# Patient Record
Sex: FEMALE | Race: WHITE | NOT HISPANIC OR LATINO | Employment: OTHER | URBAN - METROPOLITAN AREA
[De-identification: names, ages, dates, MRNs, and addresses within clinical notes are randomized per-mention and may not be internally consistent; named-entity substitution may affect disease eponyms.]

---

## 2017-01-04 RX ORDER — LEVOTHYROXINE SODIUM 112 UG/1
112 TABLET ORAL DAILY
Qty: 90 TABLET | Refills: 1 | Status: SHIPPED | OUTPATIENT
Start: 2017-01-04 | End: 2017-06-23 | Stop reason: SDUPTHER

## 2017-01-04 RX ORDER — PAROXETINE HYDROCHLORIDE 20 MG/1
20 TABLET, FILM COATED ORAL DAILY
Qty: 90 TABLET | Refills: 1 | Status: SHIPPED | OUTPATIENT
Start: 2017-01-04 | End: 2017-06-23 | Stop reason: SDUPTHER

## 2017-01-04 RX ORDER — WARFARIN SODIUM 3 MG/1
TABLET ORAL
Qty: 40 TABLET | Refills: 5 | Status: SHIPPED | OUTPATIENT
Start: 2017-01-04 | End: 2017-06-23 | Stop reason: SDUPTHER

## 2017-01-12 ENCOUNTER — CLINICAL SUPPORT (OUTPATIENT)
Dept: FAMILY MEDICINE CLINIC | Facility: CLINIC | Age: 82
End: 2017-01-12

## 2017-01-12 DIAGNOSIS — I48.0 PAROXYSMAL ATRIAL FIBRILLATION (HCC): Primary | ICD-10-CM

## 2017-01-12 LAB — INR PPP: 3.6 (ref 0.9–1.1)

## 2017-01-12 PROCEDURE — 36416 COLLJ CAPILLARY BLOOD SPEC: CPT | Performed by: FAMILY MEDICINE

## 2017-01-12 PROCEDURE — 85610 PROTHROMBIN TIME: CPT | Performed by: FAMILY MEDICINE

## 2017-01-16 ENCOUNTER — CLINICAL SUPPORT (OUTPATIENT)
Dept: FAMILY MEDICINE CLINIC | Facility: CLINIC | Age: 82
End: 2017-01-16

## 2017-01-16 DIAGNOSIS — I48.0 PAROXYSMAL ATRIAL FIBRILLATION (HCC): Primary | ICD-10-CM

## 2017-01-16 LAB — INR PPP: 1.3 (ref 0.9–1.1)

## 2017-01-16 PROCEDURE — 85610 PROTHROMBIN TIME: CPT | Performed by: FAMILY MEDICINE

## 2017-01-16 PROCEDURE — 36416 COLLJ CAPILLARY BLOOD SPEC: CPT | Performed by: FAMILY MEDICINE

## 2017-01-23 ENCOUNTER — CLINICAL SUPPORT (OUTPATIENT)
Dept: FAMILY MEDICINE CLINIC | Facility: CLINIC | Age: 82
End: 2017-01-23

## 2017-01-23 DIAGNOSIS — I48.0 PAROXYSMAL ATRIAL FIBRILLATION (HCC): Primary | ICD-10-CM

## 2017-01-23 LAB — INR PPP: 1.5 (ref 0.9–1.1)

## 2017-01-23 PROCEDURE — 36416 COLLJ CAPILLARY BLOOD SPEC: CPT | Performed by: FAMILY MEDICINE

## 2017-01-23 PROCEDURE — 85610 PROTHROMBIN TIME: CPT | Performed by: FAMILY MEDICINE

## 2017-01-26 ENCOUNTER — HOSPITAL ENCOUNTER (INPATIENT)
Facility: HOSPITAL | Age: 82
LOS: 9 days | Discharge: NURSING, REHAB OR LONG-TERM CARE | End: 2017-02-04
Attending: HOSPITALIST | Admitting: HOSPITALIST

## 2017-01-26 ENCOUNTER — OFFICE VISIT (OUTPATIENT)
Dept: FAMILY MEDICINE CLINIC | Facility: CLINIC | Age: 82
End: 2017-01-26

## 2017-01-26 ENCOUNTER — APPOINTMENT (OUTPATIENT)
Dept: GENERAL RADIOLOGY | Facility: HOSPITAL | Age: 82
End: 2017-01-26
Attending: HOSPITALIST

## 2017-01-26 VITALS
TEMPERATURE: 98 F | OXYGEN SATURATION: 86 % | HEART RATE: 82 BPM | SYSTOLIC BLOOD PRESSURE: 120 MMHG | DIASTOLIC BLOOD PRESSURE: 60 MMHG

## 2017-01-26 DIAGNOSIS — E03.9 ACQUIRED HYPOTHYROIDISM: ICD-10-CM

## 2017-01-26 DIAGNOSIS — R53.1 GENERALIZED WEAKNESS: Primary | ICD-10-CM

## 2017-01-26 DIAGNOSIS — I48.0 PAROXYSMAL ATRIAL FIBRILLATION (HCC): ICD-10-CM

## 2017-01-26 DIAGNOSIS — R53.82 CHRONIC FATIGUE: ICD-10-CM

## 2017-01-26 DIAGNOSIS — R05.9 COUGH: Primary | ICD-10-CM

## 2017-01-26 PROBLEM — J96.01 ACUTE RESPIRATORY FAILURE WITH HYPOXIA (HCC): Status: ACTIVE | Noted: 2017-01-26

## 2017-01-26 PROBLEM — J18.9 PNEUMONIA: Status: ACTIVE | Noted: 2017-01-26

## 2017-01-26 LAB
ALBUMIN SERPL-MCNC: 3.8 G/DL (ref 3.5–5.2)
ALBUMIN/GLOB SERPL: 1.4 G/DL
ALP SERPL-CCNC: 80 U/L (ref 39–117)
ALT SERPL W P-5'-P-CCNC: 19 U/L (ref 1–33)
ANION GAP SERPL CALCULATED.3IONS-SCNC: 10.5 MMOL/L
AST SERPL-CCNC: 28 U/L (ref 1–32)
B PERT DNA SPEC QL NAA+PROBE: NOT DETECTED
BASOPHILS # BLD AUTO: 0.01 10*3/MM3 (ref 0–0.2)
BASOPHILS NFR BLD AUTO: 0.3 % (ref 0–1.5)
BILIRUB SERPL-MCNC: 0.5 MG/DL (ref 0.1–1.2)
BUN BLD-MCNC: 14 MG/DL (ref 8–23)
BUN/CREAT SERPL: 15.7 (ref 7–25)
C PNEUM DNA NPH QL NAA+NON-PROBE: NOT DETECTED
CALCIUM SPEC-SCNC: 9.1 MG/DL (ref 8.2–9.6)
CHLORIDE SERPL-SCNC: 105 MMOL/L (ref 98–107)
CO2 SERPL-SCNC: 28.5 MMOL/L (ref 22–29)
CREAT BLD-MCNC: 0.89 MG/DL (ref 0.57–1)
DEPRECATED RDW RBC AUTO: 57.1 FL (ref 37–54)
EOSINOPHIL # BLD AUTO: 0 10*3/MM3 (ref 0–0.7)
EOSINOPHIL NFR BLD AUTO: 0 % (ref 0.3–6.2)
ERYTHROCYTE [DISTWIDTH] IN BLOOD BY AUTOMATED COUNT: 13.8 % (ref 11.7–13)
FLUAV H1 2009 PAND RNA NPH QL NAA+PROBE: NOT DETECTED
FLUAV H1 HA GENE NPH QL NAA+PROBE: NOT DETECTED
FLUAV H3 RNA NPH QL NAA+PROBE: NOT DETECTED
FLUAV SUBTYP SPEC NAA+PROBE: NOT DETECTED
FLUBV RNA ISLT QL NAA+PROBE: NOT DETECTED
GFR SERPL CREATININE-BSD FRML MDRD: 60 ML/MIN/1.73
GIANT PLATELETS: NORMAL
GLOBULIN UR ELPH-MCNC: 2.7 GM/DL
GLUCOSE BLD-MCNC: 97 MG/DL (ref 65–99)
HADV DNA SPEC NAA+PROBE: NOT DETECTED
HCOV 229E RNA SPEC QL NAA+PROBE: NOT DETECTED
HCOV HKU1 RNA SPEC QL NAA+PROBE: NOT DETECTED
HCOV NL63 RNA SPEC QL NAA+PROBE: NOT DETECTED
HCOV OC43 RNA SPEC QL NAA+PROBE: NOT DETECTED
HCT VFR BLD AUTO: 40 % (ref 35.6–45.5)
HGB BLD-MCNC: 12.8 G/DL (ref 11.9–15.5)
HMPV RNA NPH QL NAA+NON-PROBE: NOT DETECTED
HPIV1 RNA SPEC QL NAA+PROBE: NOT DETECTED
HPIV2 RNA SPEC QL NAA+PROBE: NOT DETECTED
HPIV3 RNA NPH QL NAA+PROBE: NOT DETECTED
HPIV4 P GENE NPH QL NAA+PROBE: NOT DETECTED
IMM GRANULOCYTES # BLD: 0 10*3/MM3 (ref 0–0.03)
IMM GRANULOCYTES NFR BLD: 0 % (ref 0–0.5)
INR PPP: 1.67 (ref 0.9–1.1)
LYMPHOCYTES # BLD AUTO: 0.5 10*3/MM3 (ref 0.9–4.8)
LYMPHOCYTES NFR BLD AUTO: 14.1 % (ref 19.6–45.3)
M PNEUMO IGG SER IA-ACNC: NOT DETECTED
MAGNESIUM SERPL-MCNC: 2 MG/DL (ref 1.6–2.4)
MCH RBC QN AUTO: 36 PG (ref 26.9–32)
MCHC RBC AUTO-ENTMCNC: 32 G/DL (ref 32.4–36.3)
MCV RBC AUTO: 112.4 FL (ref 80.5–98.2)
MONOCYTES # BLD AUTO: 0.4 10*3/MM3 (ref 0.2–1.2)
MONOCYTES NFR BLD AUTO: 11.3 % (ref 5–12)
NEUTROPHILS # BLD AUTO: 2.64 10*3/MM3 (ref 1.9–8.1)
NEUTROPHILS NFR BLD AUTO: 74.3 % (ref 42.7–76)
NRBC BLD MANUAL-RTO: 0 /100 WBC (ref 0–0)
PHOSPHATE SERPL-MCNC: 3.3 MG/DL (ref 2.5–4.5)
PLATELET # BLD AUTO: 166 10*3/MM3 (ref 140–500)
PMV BLD AUTO: 11.3 FL (ref 6–12)
POTASSIUM BLD-SCNC: 4.2 MMOL/L (ref 3.5–5.2)
PROCALCITONIN SERPL-MCNC: 0.06 NG/ML (ref 0.1–0.25)
PROT SERPL-MCNC: 6.5 G/DL (ref 6–8.5)
PROTHROMBIN TIME: 19.1 SECONDS (ref 11.7–14.2)
RBC # BLD AUTO: 3.56 10*6/MM3 (ref 3.9–5.2)
RHINOVIRUS RNA SPEC NAA+PROBE: NOT DETECTED
RSV RNA NPH QL NAA+NON-PROBE: DETECTED
SODIUM BLD-SCNC: 144 MMOL/L (ref 136–145)
SPHEROCYTES BLD QL SMEAR: NORMAL
T4 FREE SERPL-MCNC: 1.58 NG/DL (ref 0.93–1.7)
TROPONIN T SERPL-MCNC: <0.01 NG/ML (ref 0–0.03)
WBC MORPH BLD: NORMAL
WBC NRBC COR # BLD: 3.55 10*3/MM3 (ref 4.5–10.7)

## 2017-01-26 PROCEDURE — 71020 CHG CHEST X-RAY 2 VW: CPT | Performed by: FAMILY MEDICINE

## 2017-01-26 PROCEDURE — 94640 AIRWAY INHALATION TREATMENT: CPT | Performed by: FAMILY MEDICINE

## 2017-01-26 PROCEDURE — 87081 CULTURE SCREEN ONLY: CPT | Performed by: HOSPITALIST

## 2017-01-26 PROCEDURE — 87486 CHLMYD PNEUM DNA AMP PROBE: CPT | Performed by: HOSPITALIST

## 2017-01-26 PROCEDURE — 87633 RESP VIRUS 12-25 TARGETS: CPT | Performed by: HOSPITALIST

## 2017-01-26 PROCEDURE — 84145 PROCALCITONIN (PCT): CPT | Performed by: HOSPITALIST

## 2017-01-26 PROCEDURE — 84484 ASSAY OF TROPONIN QUANT: CPT | Performed by: HOSPITALIST

## 2017-01-26 PROCEDURE — 84100 ASSAY OF PHOSPHORUS: CPT | Performed by: HOSPITALIST

## 2017-01-26 PROCEDURE — 87205 SMEAR GRAM STAIN: CPT | Performed by: HOSPITALIST

## 2017-01-26 PROCEDURE — 84439 ASSAY OF FREE THYROXINE: CPT | Performed by: HOSPITALIST

## 2017-01-26 PROCEDURE — 85610 PROTHROMBIN TIME: CPT | Performed by: HOSPITALIST

## 2017-01-26 PROCEDURE — 87581 M.PNEUMON DNA AMP PROBE: CPT | Performed by: HOSPITALIST

## 2017-01-26 PROCEDURE — 97161 PT EVAL LOW COMPLEX 20 MIN: CPT

## 2017-01-26 PROCEDURE — 85025 COMPLETE CBC W/AUTO DIFF WBC: CPT | Performed by: HOSPITALIST

## 2017-01-26 PROCEDURE — 80053 COMPREHEN METABOLIC PANEL: CPT | Performed by: HOSPITALIST

## 2017-01-26 PROCEDURE — 25010000003 CEFTRIAXONE PER 250 MG: Performed by: HOSPITALIST

## 2017-01-26 PROCEDURE — 83735 ASSAY OF MAGNESIUM: CPT | Performed by: HOSPITALIST

## 2017-01-26 PROCEDURE — 97110 THERAPEUTIC EXERCISES: CPT

## 2017-01-26 PROCEDURE — 85007 BL SMEAR W/DIFF WBC COUNT: CPT | Performed by: HOSPITALIST

## 2017-01-26 PROCEDURE — 71020 HC CHEST PA AND LATERAL: CPT

## 2017-01-26 PROCEDURE — 87070 CULTURE OTHR SPECIMN AEROBIC: CPT | Performed by: HOSPITALIST

## 2017-01-26 PROCEDURE — 87798 DETECT AGENT NOS DNA AMP: CPT | Performed by: HOSPITALIST

## 2017-01-26 PROCEDURE — 25010000002 ENOXAPARIN PER 10 MG: Performed by: HOSPITALIST

## 2017-01-26 PROCEDURE — 87040 BLOOD CULTURE FOR BACTERIA: CPT | Performed by: HOSPITALIST

## 2017-01-26 PROCEDURE — 99214 OFFICE O/P EST MOD 30 MIN: CPT | Performed by: FAMILY MEDICINE

## 2017-01-26 RX ORDER — ALBUTEROL SULFATE 2.5 MG/3ML
2.5 SOLUTION RESPIRATORY (INHALATION) EVERY 6 HOURS PRN
Status: DISCONTINUED | OUTPATIENT
Start: 2017-01-26 | End: 2017-02-04 | Stop reason: HOSPADM

## 2017-01-26 RX ORDER — ACETAMINOPHEN 325 MG/1
650 TABLET ORAL EVERY 4 HOURS PRN
Status: DISCONTINUED | OUTPATIENT
Start: 2017-01-26 | End: 2017-02-04 | Stop reason: HOSPADM

## 2017-01-26 RX ORDER — CARVEDILOL 6.25 MG/1
6.25 TABLET ORAL 2 TIMES DAILY WITH MEALS
Status: DISCONTINUED | OUTPATIENT
Start: 2017-01-26 | End: 2017-02-04 | Stop reason: HOSPADM

## 2017-01-26 RX ORDER — SODIUM CHLORIDE 9 MG/ML
75 INJECTION, SOLUTION INTRAVENOUS CONTINUOUS
Status: DISCONTINUED | OUTPATIENT
Start: 2017-01-26 | End: 2017-01-28

## 2017-01-26 RX ORDER — ONDANSETRON 4 MG/1
4 TABLET, ORALLY DISINTEGRATING ORAL EVERY 6 HOURS PRN
Status: DISCONTINUED | OUTPATIENT
Start: 2017-01-26 | End: 2017-02-04 | Stop reason: HOSPADM

## 2017-01-26 RX ORDER — ONDANSETRON 2 MG/ML
4 INJECTION INTRAMUSCULAR; INTRAVENOUS EVERY 6 HOURS PRN
Status: DISCONTINUED | OUTPATIENT
Start: 2017-01-26 | End: 2017-02-04 | Stop reason: HOSPADM

## 2017-01-26 RX ORDER — CEFTRIAXONE SODIUM 1 G/50ML
1 INJECTION, SOLUTION INTRAVENOUS EVERY 24 HOURS
Status: DISCONTINUED | OUTPATIENT
Start: 2017-01-26 | End: 2017-01-29

## 2017-01-26 RX ORDER — PAROXETINE HYDROCHLORIDE 20 MG/1
20 TABLET, FILM COATED ORAL DAILY
Status: DISCONTINUED | OUTPATIENT
Start: 2017-01-26 | End: 2017-02-04 | Stop reason: HOSPADM

## 2017-01-26 RX ORDER — IPRATROPIUM BROMIDE AND ALBUTEROL SULFATE 2.5; .5 MG/3ML; MG/3ML
3 SOLUTION RESPIRATORY (INHALATION)
Status: DISCONTINUED | OUTPATIENT
Start: 2017-01-26 | End: 2017-06-23

## 2017-01-26 RX ORDER — WARFARIN SODIUM 3 MG/1
3 TABLET ORAL
Status: DISCONTINUED | OUTPATIENT
Start: 2017-01-26 | End: 2017-01-28

## 2017-01-26 RX ORDER — SODIUM CHLORIDE 0.9 % (FLUSH) 0.9 %
1-10 SYRINGE (ML) INJECTION AS NEEDED
Status: DISCONTINUED | OUTPATIENT
Start: 2017-01-26 | End: 2017-02-04 | Stop reason: HOSPADM

## 2017-01-26 RX ORDER — NITROGLYCERIN 0.4 MG/1
0.4 TABLET SUBLINGUAL
Status: DISCONTINUED | OUTPATIENT
Start: 2017-01-26 | End: 2017-02-04 | Stop reason: HOSPADM

## 2017-01-26 RX ORDER — ONDANSETRON 4 MG/1
4 TABLET, FILM COATED ORAL EVERY 6 HOURS PRN
Status: DISCONTINUED | OUTPATIENT
Start: 2017-01-26 | End: 2017-02-04 | Stop reason: HOSPADM

## 2017-01-26 RX ORDER — FUROSEMIDE 20 MG/1
20 TABLET ORAL DAILY
Status: DISCONTINUED | OUTPATIENT
Start: 2017-01-26 | End: 2017-02-04 | Stop reason: HOSPADM

## 2017-01-26 RX ORDER — LEVOTHYROXINE SODIUM 112 UG/1
112 TABLET ORAL EVERY MORNING
Status: DISCONTINUED | OUTPATIENT
Start: 2017-01-26 | End: 2017-02-04 | Stop reason: HOSPADM

## 2017-01-26 RX ADMIN — CARVEDILOL 6.25 MG: 6.25 TABLET, FILM COATED ORAL at 17:47

## 2017-01-26 RX ADMIN — CEFTRIAXONE SODIUM 1 G: 1 INJECTION, SOLUTION INTRAVENOUS at 14:46

## 2017-01-26 RX ADMIN — FUROSEMIDE 20 MG: 20 TABLET ORAL at 14:45

## 2017-01-26 RX ADMIN — WARFARIN SODIUM 3 MG: 3 TABLET ORAL at 17:47

## 2017-01-26 RX ADMIN — IPRATROPIUM BROMIDE AND ALBUTEROL SULFATE 3 ML: 2.5; .5 SOLUTION RESPIRATORY (INHALATION) at 08:08

## 2017-01-26 RX ADMIN — AZITHROMYCIN DIHYDRATE 500 MG: 500 INJECTION, POWDER, LYOPHILIZED, FOR SOLUTION INTRAVENOUS at 14:47

## 2017-01-26 RX ADMIN — SODIUM CHLORIDE 75 ML/HR: 9 INJECTION, SOLUTION INTRAVENOUS at 14:47

## 2017-01-26 RX ADMIN — ENOXAPARIN SODIUM 30 MG: 30 INJECTION SUBCUTANEOUS at 14:46

## 2017-01-26 RX ADMIN — LEVOTHYROXINE SODIUM 112 MCG: 112 TABLET ORAL at 14:45

## 2017-01-26 NOTE — MR AVS SNAPSHOT
Jazmine Brown   1/26/2017 8:15 AM   Office Visit    Dept Phone:  [phone]   Encounter #:  [id number]    Provider:  Adarsh Duckworth MD   Department:  [facility] PRIMARY CARE                Your Full Care Plan              Your Updated Medication List          This list is accurate as of: 1/26/17  9:06 AM.  Always use your most recent med list.                acetaminophen-codeine 300-30 MG per tablet   Commonly known as:  TYLENOL #3   Take 1 tablet by mouth Every 6 (Six) Hours As Needed for moderate pain (4-6).       azelastine 0.1 % nasal spray   Commonly known as:  ASTELIN       carvedilol 6.25 MG tablet   Commonly known as:  COREG   Take 1 tablet by mouth 2 (Two) Times a Day With Meals.       furosemide 40 MG tablet   Commonly known as:  LASIX       levothyroxine 112 MCG tablet   Commonly known as:  SYNTHROID, LEVOTHROID   Take 1 tablet by mouth Daily.       mupirocin 2 % ointment   Commonly known as:  BACTROBAN       nitrofurantoin (macrocrystal-monohydrate) 100 MG capsule   Commonly known as:  MACROBID   Take 1 capsule by mouth 2 (two) times a day.       PARoxetine 20 MG tablet   Commonly known as:  PAXIL   Take 1 tablet by mouth Daily.       phenazopyridine 100 MG tablet   Commonly known as:  PYRIDIUM   Take 1 tablet by mouth 3 (three) times a day as needed for bladder spasms.       PROAIR  (90 BASE) MCG/ACT inhaler   Generic drug:  albuterol       vitamin D 74350 UNITS capsule capsule   Commonly known as:  ERGOCALCIFEROL   TAKE ONE CAPSULE BY MOUTH ONCE A WEEK       warfarin 3 MG tablet   Commonly known as:  COUMADIN   Take two tablets on Tuesday, Thursday and Saturday, and one table Monday, Wednesday, Friday and Sunday.               We Performed the Following     XR Chest PA & Lateral       You Were Diagnosed With        Codes Comments    Cough    -  Primary ICD-10-CM: R05  ICD-9-CM: 786.2     Acquired hypothyroidism     ICD-10-CM: E03.9  ICD-9-CM:  244.9     Chronic fatigue     ICD-10-CM: R53.82  ICD-9-CM: 780.79     Paroxysmal atrial fibrillation     ICD-10-CM: I48.0  ICD-9-CM: 427.31       Medications to be Given to You by a Medical Professional     Due       Frequency    2017 ipratropium-albuterol (DUO-NEB) nebulizer solution 3 mL  4 Times Daily - RT      Instructions    This is a very nice 89-year-old who is hypoxic and short of breath with a productive cough and cardiomegaly and a right middle lobe infiltrate.  I will see if I can get her directly admitted to the hospital.     Patient Instructions History      Upcoming Appointments     Visit Type Date Time Department    OFFICE VISIT 2017  8:15 AM "[employer] Signup     [employer] allows you to send messages to your doctor, view your test results, renew your prescriptions, schedule appointments, and more. To sign up, go to [employer] and click on the Sign Up Now link in the New User? box. Enter your [employer] Activation Code exactly as it appears below along with the last four digits of your Social Security Number and your Date of Birth () to complete the sign-up process. If you do not sign up before the expiration date, you must request a new code.    [employer] Activation Code: [email or web address]  Expires: 2017  1:33 PM    If you have questions, you can email [email or web address] or call [phone] to talk to our [employer] staff. Remember, [employer] is NOT to be used for urgent needs. For medical emergencies, dial 911.               Other Info from Your Visit           Allergies     No Known Allergies      Reason for Visit     Cough     URI           Vital Signs     Blood Pressure Pulse Temperature Last Menstrual Period Oxygen Saturation Smoking Status    120/60 82 98 °F (36.7 °C) (LMP Unknown) 86% Never Smoker      Problems and Diagnoses Noted     Acquired underactive thyroid    Cough    Tiredness    Atrial fibrillation (irregular heartbeat)       Medications Administered     ipratropium-albuterol (DUO-NEB) nebulizer solution 3 mL

## 2017-01-26 NOTE — PATIENT INSTRUCTIONS
This is a very nice 89-year-old who is hypoxic and short of breath with a productive cough and cardiomegaly and a right middle lobe infiltrate.  I will see if I can get her directly admitted to the hospital.

## 2017-01-26 NOTE — PROGRESS NOTES
Subjective   Jazmine Brown is a 89 y.o. female presenting with   Chief Complaint   Patient presents with   • Cough   • URI        HPI Comments: This is a 89-year-old white female who comes in today saying that night before last she began to feel bad with cough and fever and shortness of breath.  She says the cough is productive of yellow sputum.  She also has generalized fatigue but she has not passed out.  She does not have palpitations or chest pain.  She does not have any leg swelling to suggest deep vein thrombosis.  She does have a history of warfarin therapy for chronic atrial fibrillation.    To further evaluate her clinical presentation I have requested a chest x-ray after she completes her nebulizer treatment.  I compared this to the one done in September 2016.  Her x-ray shows cardiomegaly and atherosclerotic vascular disease and small pleural effusion with a right middle lobe infiltrate.    Cough   Associated symptoms include a fever and shortness of breath.   URI    Associated symptoms include coughing.    I ordered and administered the nebulizer treatment due to wheezing and dyspnea with hypoxia.  The treatment helped all parameters, with noticeable improvement in her breathing and O2 saturation.    The following portions of the patient's history were reviewed and updated as appropriate: current medications, past family history, past medical history, past social history, past surgical history and problem list.    Review of Systems   Constitutional: Positive for fatigue and fever.   Respiratory: Positive for cough and shortness of breath.    All other systems reviewed and are negative.      Objective   Physical Exam   Constitutional: She is oriented to person, place, and time. She appears well-developed and well-nourished. She appears distressed (she is mildly short of breath at rest).   HENT:   Head: Normocephalic and atraumatic.   Mouth/Throat: Oropharynx is clear and moist. No oropharyngeal exudate.    Eyes: EOM are normal. Pupils are equal, round, and reactive to light. No scleral icterus.   Neck: Normal range of motion. Neck supple. No JVD present. No thyromegaly present.   Cardiovascular: Normal rate.  An irregularly irregular rhythm present.   Murmur (1/6 systolic ejection murmur) heard.  Pulmonary/Chest: No stridor. She is in respiratory distress (mildly dyspneic at rest). She has wheezes in the right upper field and the right middle field. She has rhonchi in the right upper field, the right middle field and the left upper field. She has rales in the right upper field and the right middle field. She exhibits no tenderness.   Abdominal: Soft. Bowel sounds are normal. She exhibits no distension.   Musculoskeletal: Normal range of motion. She exhibits no edema.   Lymphadenopathy:     She has no cervical adenopathy.   Neurological: She is alert and oriented to person, place, and time.   Skin: Skin is warm and dry. No rash noted. She is not diaphoretic.   Psychiatric: She has a normal mood and affect. Her behavior is normal.   Nursing note and vitals reviewed.      Assessment/Plan   Jazmine was seen today for cough and uri.    Diagnoses and all orders for this visit:    Cough  -     XR Chest PA & Lateral  -     ipratropium-albuterol (DUO-NEB) nebulizer solution 3 mL; Take 3 mL by nebulization 4 (Four) Times a Day.    Acquired hypothyroidism    Chronic fatigue    Paroxysmal atrial fibrillation                   I would like him to return for another visit in 3 month(s)

## 2017-01-27 LAB
ANION GAP SERPL CALCULATED.3IONS-SCNC: 13.8 MMOL/L
BUN BLD-MCNC: 13 MG/DL (ref 8–23)
BUN/CREAT SERPL: 15.3 (ref 7–25)
CALCIUM SPEC-SCNC: 8.5 MG/DL (ref 8.2–9.6)
CHLORIDE SERPL-SCNC: 105 MMOL/L (ref 98–107)
CO2 SERPL-SCNC: 26.2 MMOL/L (ref 22–29)
CREAT BLD-MCNC: 0.85 MG/DL (ref 0.57–1)
DEPRECATED RDW RBC AUTO: 59.9 FL (ref 37–54)
ERYTHROCYTE [DISTWIDTH] IN BLOOD BY AUTOMATED COUNT: 14.1 % (ref 11.7–13)
GFR SERPL CREATININE-BSD FRML MDRD: 63 ML/MIN/1.73
GLUCOSE BLD-MCNC: 89 MG/DL (ref 65–99)
HCT VFR BLD AUTO: 40 % (ref 35.6–45.5)
HGB BLD-MCNC: 12.2 G/DL (ref 11.9–15.5)
INR PPP: 1.85 (ref 0.9–1.1)
MCH RBC QN AUTO: 35.6 PG (ref 26.9–32)
MCHC RBC AUTO-ENTMCNC: 30.5 G/DL (ref 32.4–36.3)
MCV RBC AUTO: 116.6 FL (ref 80.5–98.2)
PLATELET # BLD AUTO: 151 10*3/MM3 (ref 140–500)
PMV BLD AUTO: 11 FL (ref 6–12)
POTASSIUM BLD-SCNC: 3.8 MMOL/L (ref 3.5–5.2)
PROTHROMBIN TIME: 20.7 SECONDS (ref 11.7–14.2)
RBC # BLD AUTO: 3.43 10*6/MM3 (ref 3.9–5.2)
SODIUM BLD-SCNC: 145 MMOL/L (ref 136–145)
TROPONIN T SERPL-MCNC: <0.01 NG/ML (ref 0–0.03)
WBC NRBC COR # BLD: 3.36 10*3/MM3 (ref 4.5–10.7)

## 2017-01-27 PROCEDURE — 94640 AIRWAY INHALATION TREATMENT: CPT

## 2017-01-27 PROCEDURE — 84484 ASSAY OF TROPONIN QUANT: CPT | Performed by: HOSPITALIST

## 2017-01-27 PROCEDURE — 25010000002 ENOXAPARIN PER 10 MG: Performed by: HOSPITALIST

## 2017-01-27 PROCEDURE — 80048 BASIC METABOLIC PNL TOTAL CA: CPT | Performed by: HOSPITALIST

## 2017-01-27 PROCEDURE — 25010000003 CEFTRIAXONE PER 250 MG: Performed by: HOSPITALIST

## 2017-01-27 PROCEDURE — 97110 THERAPEUTIC EXERCISES: CPT

## 2017-01-27 PROCEDURE — 94799 UNLISTED PULMONARY SVC/PX: CPT

## 2017-01-27 PROCEDURE — 85027 COMPLETE CBC AUTOMATED: CPT | Performed by: HOSPITALIST

## 2017-01-27 PROCEDURE — 85610 PROTHROMBIN TIME: CPT | Performed by: HOSPITALIST

## 2017-01-27 RX ADMIN — PAROXETINE HYDROCHLORIDE HEMIHYDRATE 20 MG: 20 TABLET, FILM COATED ORAL at 08:26

## 2017-01-27 RX ADMIN — ACETAMINOPHEN 650 MG: 325 TABLET ORAL at 23:13

## 2017-01-27 RX ADMIN — AZITHROMYCIN DIHYDRATE 500 MG: 500 INJECTION, POWDER, LYOPHILIZED, FOR SOLUTION INTRAVENOUS at 11:47

## 2017-01-27 RX ADMIN — LEVOTHYROXINE SODIUM 112 MCG: 112 TABLET ORAL at 08:26

## 2017-01-27 RX ADMIN — ENOXAPARIN SODIUM 30 MG: 30 INJECTION SUBCUTANEOUS at 08:27

## 2017-01-27 RX ADMIN — ALBUTEROL SULFATE 2.5 MG: 2.5 SOLUTION RESPIRATORY (INHALATION) at 01:34

## 2017-01-27 RX ADMIN — ALBUTEROL SULFATE 2.5 MG: 2.5 SOLUTION RESPIRATORY (INHALATION) at 21:41

## 2017-01-27 RX ADMIN — CARVEDILOL 6.25 MG: 6.25 TABLET, FILM COATED ORAL at 08:27

## 2017-01-27 RX ADMIN — CEFTRIAXONE SODIUM 1 G: 1 INJECTION, SOLUTION INTRAVENOUS at 11:22

## 2017-01-27 RX ADMIN — SODIUM CHLORIDE 75 ML/HR: 9 INJECTION, SOLUTION INTRAVENOUS at 11:47

## 2017-01-27 RX ADMIN — CARVEDILOL 6.25 MG: 6.25 TABLET, FILM COATED ORAL at 17:20

## 2017-01-27 RX ADMIN — WARFARIN SODIUM 3 MG: 3 TABLET ORAL at 17:20

## 2017-01-27 RX ADMIN — FUROSEMIDE 20 MG: 20 TABLET ORAL at 08:26

## 2017-01-27 RX ADMIN — ACETAMINOPHEN 650 MG: 325 TABLET ORAL at 11:21

## 2017-01-28 LAB
INR PPP: 1.84 (ref 0.9–1.1)
MRSA SPEC QL CULT: NORMAL
PROTHROMBIN TIME: 20.6 SECONDS (ref 11.7–14.2)

## 2017-01-28 PROCEDURE — 94640 AIRWAY INHALATION TREATMENT: CPT

## 2017-01-28 PROCEDURE — 85610 PROTHROMBIN TIME: CPT | Performed by: HOSPITALIST

## 2017-01-28 PROCEDURE — 25010000002 ENOXAPARIN PER 10 MG: Performed by: HOSPITALIST

## 2017-01-28 PROCEDURE — 94799 UNLISTED PULMONARY SVC/PX: CPT

## 2017-01-28 PROCEDURE — 25010000003 CEFTRIAXONE PER 250 MG: Performed by: HOSPITALIST

## 2017-01-28 RX ORDER — IPRATROPIUM BROMIDE AND ALBUTEROL SULFATE 2.5; .5 MG/3ML; MG/3ML
3 SOLUTION RESPIRATORY (INHALATION)
Status: DISCONTINUED | OUTPATIENT
Start: 2017-01-28 | End: 2017-02-01

## 2017-01-28 RX ORDER — WARFARIN SODIUM 5 MG/1
5 TABLET ORAL
Status: DISCONTINUED | OUTPATIENT
Start: 2017-01-28 | End: 2017-01-30

## 2017-01-28 RX ADMIN — ENOXAPARIN SODIUM 30 MG: 30 INJECTION SUBCUTANEOUS at 08:17

## 2017-01-28 RX ADMIN — ALBUTEROL SULFATE 2.5 MG: 2.5 SOLUTION RESPIRATORY (INHALATION) at 09:38

## 2017-01-28 RX ADMIN — LEVOTHYROXINE SODIUM 112 MCG: 112 TABLET ORAL at 07:04

## 2017-01-28 RX ADMIN — IPRATROPIUM BROMIDE AND ALBUTEROL SULFATE 3 ML: .5; 3 SOLUTION RESPIRATORY (INHALATION) at 23:54

## 2017-01-28 RX ADMIN — CARVEDILOL 6.25 MG: 6.25 TABLET, FILM COATED ORAL at 08:17

## 2017-01-28 RX ADMIN — WARFARIN SODIUM 5 MG: 5 TABLET ORAL at 17:39

## 2017-01-28 RX ADMIN — CARVEDILOL 6.25 MG: 6.25 TABLET, FILM COATED ORAL at 17:39

## 2017-01-28 RX ADMIN — ACETAMINOPHEN 650 MG: 325 TABLET ORAL at 22:01

## 2017-01-28 RX ADMIN — IPRATROPIUM BROMIDE AND ALBUTEROL SULFATE 3 ML: .5; 3 SOLUTION RESPIRATORY (INHALATION) at 19:16

## 2017-01-28 RX ADMIN — IPRATROPIUM BROMIDE AND ALBUTEROL SULFATE 3 ML: .5; 3 SOLUTION RESPIRATORY (INHALATION) at 12:40

## 2017-01-28 RX ADMIN — AZITHROMYCIN DIHYDRATE 500 MG: 500 INJECTION, POWDER, LYOPHILIZED, FOR SOLUTION INTRAVENOUS at 12:00

## 2017-01-28 RX ADMIN — FUROSEMIDE 20 MG: 20 TABLET ORAL at 08:17

## 2017-01-28 RX ADMIN — CEFTRIAXONE SODIUM 1 G: 1 INJECTION, SOLUTION INTRAVENOUS at 13:00

## 2017-01-28 RX ADMIN — HYDROCODONE BITARTRATE AND HOMATROPINE METHYLBROMIDE 5 ML: 5; 1.5 SOLUTION ORAL at 20:58

## 2017-01-28 RX ADMIN — SODIUM CHLORIDE 75 ML/HR: 9 INJECTION, SOLUTION INTRAVENOUS at 07:04

## 2017-01-28 RX ADMIN — IPRATROPIUM BROMIDE AND ALBUTEROL SULFATE 3 ML: .5; 3 SOLUTION RESPIRATORY (INHALATION) at 16:17

## 2017-01-28 RX ADMIN — PAROXETINE HYDROCHLORIDE HEMIHYDRATE 20 MG: 20 TABLET, FILM COATED ORAL at 08:17

## 2017-01-29 LAB
ANION GAP SERPL CALCULATED.3IONS-SCNC: 13.8 MMOL/L
BACTERIA SPEC RESP CULT: NORMAL
BASOPHILS # BLD AUTO: 0 10*3/MM3 (ref 0–0.2)
BASOPHILS NFR BLD AUTO: 0 % (ref 0–1.5)
BUN BLD-MCNC: 11 MG/DL (ref 8–23)
BUN/CREAT SERPL: 15.1 (ref 7–25)
CALCIUM SPEC-SCNC: 8.6 MG/DL (ref 8.2–9.6)
CHLORIDE SERPL-SCNC: 101 MMOL/L (ref 98–107)
CO2 SERPL-SCNC: 27.2 MMOL/L (ref 22–29)
CREAT BLD-MCNC: 0.73 MG/DL (ref 0.57–1)
DEPRECATED RDW RBC AUTO: 56.6 FL (ref 37–54)
EOSINOPHIL # BLD AUTO: 0 10*3/MM3 (ref 0–0.7)
EOSINOPHIL NFR BLD AUTO: 0 % (ref 0.3–6.2)
ERYTHROCYTE [DISTWIDTH] IN BLOOD BY AUTOMATED COUNT: 13.8 % (ref 11.7–13)
GFR SERPL CREATININE-BSD FRML MDRD: 75 ML/MIN/1.73
GLUCOSE BLD-MCNC: 104 MG/DL (ref 65–99)
GRAM STN SPEC: NORMAL
GRAM STN SPEC: NORMAL
HCT VFR BLD AUTO: 39.5 % (ref 35.6–45.5)
HGB BLD-MCNC: 12.5 G/DL (ref 11.9–15.5)
IMM GRANULOCYTES # BLD: 0.02 10*3/MM3 (ref 0–0.03)
IMM GRANULOCYTES NFR BLD: 0.4 % (ref 0–0.5)
INR PPP: 1.93 (ref 0.9–1.1)
LYMPHOCYTES # BLD AUTO: 0.45 10*3/MM3 (ref 0.9–4.8)
LYMPHOCYTES NFR BLD AUTO: 8.2 % (ref 19.6–45.3)
MCH RBC QN AUTO: 35.6 PG (ref 26.9–32)
MCHC RBC AUTO-ENTMCNC: 31.6 G/DL (ref 32.4–36.3)
MCV RBC AUTO: 112.5 FL (ref 80.5–98.2)
MONOCYTES # BLD AUTO: 0.36 10*3/MM3 (ref 0.2–1.2)
MONOCYTES NFR BLD AUTO: 6.6 % (ref 5–12)
NEUTROPHILS # BLD AUTO: 4.63 10*3/MM3 (ref 1.9–8.1)
NEUTROPHILS NFR BLD AUTO: 84.8 % (ref 42.7–76)
PLATELET # BLD AUTO: 135 10*3/MM3 (ref 140–500)
PMV BLD AUTO: 11 FL (ref 6–12)
POTASSIUM BLD-SCNC: 3.8 MMOL/L (ref 3.5–5.2)
PROTHROMBIN TIME: 21.4 SECONDS (ref 11.7–14.2)
RBC # BLD AUTO: 3.51 10*6/MM3 (ref 3.9–5.2)
SODIUM BLD-SCNC: 142 MMOL/L (ref 136–145)
WBC NRBC COR # BLD: 5.46 10*3/MM3 (ref 4.5–10.7)

## 2017-01-29 PROCEDURE — 97110 THERAPEUTIC EXERCISES: CPT | Performed by: PHYSICAL THERAPIST

## 2017-01-29 PROCEDURE — 94799 UNLISTED PULMONARY SVC/PX: CPT

## 2017-01-29 PROCEDURE — 85610 PROTHROMBIN TIME: CPT | Performed by: HOSPITALIST

## 2017-01-29 PROCEDURE — 94640 AIRWAY INHALATION TREATMENT: CPT

## 2017-01-29 PROCEDURE — 85025 COMPLETE CBC W/AUTO DIFF WBC: CPT | Performed by: HOSPITALIST

## 2017-01-29 PROCEDURE — 25010000002 ENOXAPARIN PER 10 MG: Performed by: HOSPITALIST

## 2017-01-29 PROCEDURE — 80048 BASIC METABOLIC PNL TOTAL CA: CPT | Performed by: HOSPITALIST

## 2017-01-29 RX ORDER — BUDESONIDE 0.5 MG/2ML
1 INHALANT ORAL
Status: DISCONTINUED | OUTPATIENT
Start: 2017-01-29 | End: 2017-01-30 | Stop reason: CLARIF

## 2017-01-29 RX ORDER — CEFDINIR 300 MG/1
300 CAPSULE ORAL EVERY 12 HOURS SCHEDULED
Status: COMPLETED | OUTPATIENT
Start: 2017-01-29 | End: 2017-01-31

## 2017-01-29 RX ADMIN — CARVEDILOL 6.25 MG: 6.25 TABLET, FILM COATED ORAL at 08:00

## 2017-01-29 RX ADMIN — CARVEDILOL 6.25 MG: 6.25 TABLET, FILM COATED ORAL at 18:49

## 2017-01-29 RX ADMIN — FUROSEMIDE 20 MG: 20 TABLET ORAL at 09:35

## 2017-01-29 RX ADMIN — BUDESONIDE 1 MG: 0.5 INHALANT RESPIRATORY (INHALATION) at 20:06

## 2017-01-29 RX ADMIN — ACETAMINOPHEN 650 MG: 325 TABLET ORAL at 10:33

## 2017-01-29 RX ADMIN — CEFDINIR 300 MG: 300 CAPSULE ORAL at 22:42

## 2017-01-29 RX ADMIN — IPRATROPIUM BROMIDE AND ALBUTEROL SULFATE 3 ML: .5; 3 SOLUTION RESPIRATORY (INHALATION) at 15:00

## 2017-01-29 RX ADMIN — AZITHROMYCIN DIHYDRATE 500 MG: 500 INJECTION, POWDER, LYOPHILIZED, FOR SOLUTION INTRAVENOUS at 13:08

## 2017-01-29 RX ADMIN — ENOXAPARIN SODIUM 30 MG: 30 INJECTION SUBCUTANEOUS at 09:36

## 2017-01-29 RX ADMIN — LEVOTHYROXINE SODIUM 112 MCG: 112 TABLET ORAL at 06:59

## 2017-01-29 RX ADMIN — CEFDINIR 300 MG: 300 CAPSULE ORAL at 13:00

## 2017-01-29 RX ADMIN — PAROXETINE HYDROCHLORIDE HEMIHYDRATE 20 MG: 20 TABLET, FILM COATED ORAL at 09:35

## 2017-01-29 RX ADMIN — WARFARIN SODIUM 5 MG: 5 TABLET ORAL at 18:49

## 2017-01-29 RX ADMIN — IPRATROPIUM BROMIDE AND ALBUTEROL SULFATE 3 ML: .5; 3 SOLUTION RESPIRATORY (INHALATION) at 23:37

## 2017-01-29 RX ADMIN — IPRATROPIUM BROMIDE AND ALBUTEROL SULFATE 3 ML: .5; 3 SOLUTION RESPIRATORY (INHALATION) at 11:14

## 2017-01-29 RX ADMIN — IPRATROPIUM BROMIDE AND ALBUTEROL SULFATE 3 ML: .5; 3 SOLUTION RESPIRATORY (INHALATION) at 04:02

## 2017-01-29 RX ADMIN — IPRATROPIUM BROMIDE AND ALBUTEROL SULFATE 3 ML: .5; 3 SOLUTION RESPIRATORY (INHALATION) at 06:49

## 2017-01-29 RX ADMIN — IPRATROPIUM BROMIDE AND ALBUTEROL SULFATE 3 ML: .5; 3 SOLUTION RESPIRATORY (INHALATION) at 20:07

## 2017-01-30 LAB
ANION GAP SERPL CALCULATED.3IONS-SCNC: 12.9 MMOL/L
BUN BLD-MCNC: 13 MG/DL (ref 8–23)
BUN/CREAT SERPL: 18.8 (ref 7–25)
CALCIUM SPEC-SCNC: 8.6 MG/DL (ref 8.2–9.6)
CHLORIDE SERPL-SCNC: 100 MMOL/L (ref 98–107)
CO2 SERPL-SCNC: 27.1 MMOL/L (ref 22–29)
CREAT BLD-MCNC: 0.69 MG/DL (ref 0.57–1)
DEPRECATED RDW RBC AUTO: 58 FL (ref 37–54)
ERYTHROCYTE [DISTWIDTH] IN BLOOD BY AUTOMATED COUNT: 13.8 % (ref 11.7–13)
FOLATE SERPL-MCNC: 18.08 NG/ML (ref 4.78–24.2)
GFR SERPL CREATININE-BSD FRML MDRD: 80 ML/MIN/1.73
GLUCOSE BLD-MCNC: 106 MG/DL (ref 65–99)
HCT VFR BLD AUTO: 40.6 % (ref 35.6–45.5)
HGB BLD-MCNC: 12.7 G/DL (ref 11.9–15.5)
INR PPP: 2.3 (ref 0.9–1.1)
MCH RBC QN AUTO: 35.8 PG (ref 26.9–32)
MCHC RBC AUTO-ENTMCNC: 31.3 G/DL (ref 32.4–36.3)
MCV RBC AUTO: 114.4 FL (ref 80.5–98.2)
PLATELET # BLD AUTO: 142 10*3/MM3 (ref 140–500)
PMV BLD AUTO: 11.2 FL (ref 6–12)
POTASSIUM BLD-SCNC: 3.9 MMOL/L (ref 3.5–5.2)
PROTHROMBIN TIME: 24.6 SECONDS (ref 11.7–14.2)
RBC # BLD AUTO: 3.55 10*6/MM3 (ref 3.9–5.2)
SODIUM BLD-SCNC: 140 MMOL/L (ref 136–145)
VIT B12 BLD-MCNC: 959 PG/ML (ref 211–946)
WBC NRBC COR # BLD: 4.78 10*3/MM3 (ref 4.5–10.7)

## 2017-01-30 PROCEDURE — 85610 PROTHROMBIN TIME: CPT | Performed by: HOSPITALIST

## 2017-01-30 PROCEDURE — 94799 UNLISTED PULMONARY SVC/PX: CPT

## 2017-01-30 PROCEDURE — 25010000002 ENOXAPARIN PER 10 MG: Performed by: HOSPITALIST

## 2017-01-30 PROCEDURE — 97110 THERAPEUTIC EXERCISES: CPT

## 2017-01-30 PROCEDURE — 94640 AIRWAY INHALATION TREATMENT: CPT

## 2017-01-30 PROCEDURE — 82746 ASSAY OF FOLIC ACID SERUM: CPT | Performed by: HOSPITALIST

## 2017-01-30 PROCEDURE — 85027 COMPLETE CBC AUTOMATED: CPT | Performed by: HOSPITALIST

## 2017-01-30 PROCEDURE — 80048 BASIC METABOLIC PNL TOTAL CA: CPT | Performed by: HOSPITALIST

## 2017-01-30 PROCEDURE — 82607 VITAMIN B-12: CPT | Performed by: HOSPITALIST

## 2017-01-30 RX ORDER — BUDESONIDE 1 MG/2ML
1 INHALANT ORAL
Status: DISCONTINUED | OUTPATIENT
Start: 2017-01-30 | End: 2017-02-04 | Stop reason: HOSPADM

## 2017-01-30 RX ORDER — WARFARIN SODIUM 3 MG/1
3 TABLET ORAL
Status: DISCONTINUED | OUTPATIENT
Start: 2017-01-30 | End: 2017-02-01

## 2017-01-30 RX ADMIN — CEFDINIR 300 MG: 300 CAPSULE ORAL at 09:02

## 2017-01-30 RX ADMIN — IPRATROPIUM BROMIDE AND ALBUTEROL SULFATE 3 ML: .5; 3 SOLUTION RESPIRATORY (INHALATION) at 19:16

## 2017-01-30 RX ADMIN — IPRATROPIUM BROMIDE AND ALBUTEROL SULFATE 3 ML: .5; 3 SOLUTION RESPIRATORY (INHALATION) at 07:12

## 2017-01-30 RX ADMIN — PAROXETINE HYDROCHLORIDE HEMIHYDRATE 20 MG: 20 TABLET, FILM COATED ORAL at 09:02

## 2017-01-30 RX ADMIN — CEFDINIR 300 MG: 300 CAPSULE ORAL at 21:24

## 2017-01-30 RX ADMIN — CARVEDILOL 6.25 MG: 6.25 TABLET, FILM COATED ORAL at 09:02

## 2017-01-30 RX ADMIN — IPRATROPIUM BROMIDE AND ALBUTEROL SULFATE 3 ML: .5; 3 SOLUTION RESPIRATORY (INHALATION) at 15:36

## 2017-01-30 RX ADMIN — CARVEDILOL 6.25 MG: 6.25 TABLET, FILM COATED ORAL at 17:54

## 2017-01-30 RX ADMIN — ACETAMINOPHEN 650 MG: 325 TABLET ORAL at 21:24

## 2017-01-30 RX ADMIN — FUROSEMIDE 20 MG: 20 TABLET ORAL at 09:02

## 2017-01-30 RX ADMIN — BUDESONIDE 1 MG: 0.5 INHALANT RESPIRATORY (INHALATION) at 07:12

## 2017-01-30 RX ADMIN — WARFARIN SODIUM 3 MG: 3 TABLET ORAL at 17:54

## 2017-01-30 RX ADMIN — ENOXAPARIN SODIUM 30 MG: 30 INJECTION SUBCUTANEOUS at 09:02

## 2017-01-30 RX ADMIN — IPRATROPIUM BROMIDE AND ALBUTEROL SULFATE 3 ML: .5; 3 SOLUTION RESPIRATORY (INHALATION) at 12:12

## 2017-01-30 RX ADMIN — LEVOTHYROXINE SODIUM 112 MCG: 112 TABLET ORAL at 09:02

## 2017-01-30 RX ADMIN — IPRATROPIUM BROMIDE AND ALBUTEROL SULFATE 3 ML: .5; 3 SOLUTION RESPIRATORY (INHALATION) at 04:04

## 2017-01-31 LAB
ANION GAP SERPL CALCULATED.3IONS-SCNC: 11.8 MMOL/L
BACTERIA SPEC AEROBE CULT: NORMAL
BACTERIA SPEC AEROBE CULT: NORMAL
BUN BLD-MCNC: 12 MG/DL (ref 8–23)
BUN/CREAT SERPL: 18.2 (ref 7–25)
CALCIUM SPEC-SCNC: 8.4 MG/DL (ref 8.2–9.6)
CHLORIDE SERPL-SCNC: 101 MMOL/L (ref 98–107)
CO2 SERPL-SCNC: 29.2 MMOL/L (ref 22–29)
CREAT BLD-MCNC: 0.66 MG/DL (ref 0.57–1)
DEPRECATED RDW RBC AUTO: 55.1 FL (ref 37–54)
ERYTHROCYTE [DISTWIDTH] IN BLOOD BY AUTOMATED COUNT: 13.6 % (ref 11.7–13)
GFR SERPL CREATININE-BSD FRML MDRD: 84 ML/MIN/1.73
GLUCOSE BLD-MCNC: 95 MG/DL (ref 65–99)
HCT VFR BLD AUTO: 39.4 % (ref 35.6–45.5)
HGB BLD-MCNC: 12.4 G/DL (ref 11.9–15.5)
INR PPP: 2.61 (ref 0.9–1.1)
MCH RBC QN AUTO: 35.1 PG (ref 26.9–32)
MCHC RBC AUTO-ENTMCNC: 31.5 G/DL (ref 32.4–36.3)
MCV RBC AUTO: 111.6 FL (ref 80.5–98.2)
PLATELET # BLD AUTO: 127 10*3/MM3 (ref 140–500)
PMV BLD AUTO: 11.4 FL (ref 6–12)
POTASSIUM BLD-SCNC: 3.6 MMOL/L (ref 3.5–5.2)
PROTHROMBIN TIME: 27.1 SECONDS (ref 11.7–14.2)
RBC # BLD AUTO: 3.53 10*6/MM3 (ref 3.9–5.2)
SODIUM BLD-SCNC: 142 MMOL/L (ref 136–145)
WBC NRBC COR # BLD: 4.11 10*3/MM3 (ref 4.5–10.7)

## 2017-01-31 PROCEDURE — 85610 PROTHROMBIN TIME: CPT | Performed by: HOSPITALIST

## 2017-01-31 PROCEDURE — 94799 UNLISTED PULMONARY SVC/PX: CPT

## 2017-01-31 PROCEDURE — 80048 BASIC METABOLIC PNL TOTAL CA: CPT | Performed by: HOSPITALIST

## 2017-01-31 PROCEDURE — 97110 THERAPEUTIC EXERCISES: CPT

## 2017-01-31 PROCEDURE — 25010000002 ENOXAPARIN PER 10 MG: Performed by: HOSPITALIST

## 2017-01-31 PROCEDURE — 94640 AIRWAY INHALATION TREATMENT: CPT

## 2017-01-31 PROCEDURE — 85027 COMPLETE CBC AUTOMATED: CPT | Performed by: HOSPITALIST

## 2017-01-31 RX ADMIN — BUDESONIDE 1 MG: 1 SUSPENSION RESPIRATORY (INHALATION) at 19:27

## 2017-01-31 RX ADMIN — CARVEDILOL 6.25 MG: 6.25 TABLET, FILM COATED ORAL at 10:01

## 2017-01-31 RX ADMIN — IPRATROPIUM BROMIDE AND ALBUTEROL SULFATE 3 ML: .5; 3 SOLUTION RESPIRATORY (INHALATION) at 08:42

## 2017-01-31 RX ADMIN — WARFARIN SODIUM 3 MG: 3 TABLET ORAL at 18:33

## 2017-01-31 RX ADMIN — ENOXAPARIN SODIUM 30 MG: 30 INJECTION SUBCUTANEOUS at 10:01

## 2017-01-31 RX ADMIN — IPRATROPIUM BROMIDE AND ALBUTEROL SULFATE 3 ML: .5; 3 SOLUTION RESPIRATORY (INHALATION) at 19:27

## 2017-01-31 RX ADMIN — CARVEDILOL 6.25 MG: 6.25 TABLET, FILM COATED ORAL at 18:33

## 2017-01-31 RX ADMIN — CEFDINIR 300 MG: 300 CAPSULE ORAL at 10:01

## 2017-01-31 RX ADMIN — IPRATROPIUM BROMIDE AND ALBUTEROL SULFATE 3 ML: .5; 3 SOLUTION RESPIRATORY (INHALATION) at 23:32

## 2017-01-31 RX ADMIN — CEFDINIR 300 MG: 300 CAPSULE ORAL at 21:10

## 2017-01-31 RX ADMIN — IPRATROPIUM BROMIDE AND ALBUTEROL SULFATE 3 ML: .5; 3 SOLUTION RESPIRATORY (INHALATION) at 03:24

## 2017-01-31 RX ADMIN — BUDESONIDE 1 MG: 1 SUSPENSION RESPIRATORY (INHALATION) at 08:42

## 2017-01-31 RX ADMIN — FUROSEMIDE 20 MG: 20 TABLET ORAL at 10:01

## 2017-01-31 RX ADMIN — PAROXETINE HYDROCHLORIDE HEMIHYDRATE 20 MG: 20 TABLET, FILM COATED ORAL at 10:01

## 2017-01-31 RX ADMIN — IPRATROPIUM BROMIDE AND ALBUTEROL SULFATE 3 ML: .5; 3 SOLUTION RESPIRATORY (INHALATION) at 15:54

## 2017-01-31 RX ADMIN — IPRATROPIUM BROMIDE AND ALBUTEROL SULFATE 3 ML: .5; 3 SOLUTION RESPIRATORY (INHALATION) at 11:57

## 2017-01-31 RX ADMIN — ACETAMINOPHEN 650 MG: 325 TABLET ORAL at 23:27

## 2017-01-31 RX ADMIN — LEVOTHYROXINE SODIUM 112 MCG: 112 TABLET ORAL at 10:01

## 2017-02-01 LAB
ANION GAP SERPL CALCULATED.3IONS-SCNC: 13.6 MMOL/L
BUN BLD-MCNC: 13 MG/DL (ref 8–23)
BUN/CREAT SERPL: 17.1 (ref 7–25)
CALCIUM SPEC-SCNC: 8.3 MG/DL (ref 8.2–9.6)
CHLORIDE SERPL-SCNC: 100 MMOL/L (ref 98–107)
CO2 SERPL-SCNC: 30.4 MMOL/L (ref 22–29)
CREAT BLD-MCNC: 0.76 MG/DL (ref 0.57–1)
DEPRECATED RDW RBC AUTO: 56.7 FL (ref 37–54)
ERYTHROCYTE [DISTWIDTH] IN BLOOD BY AUTOMATED COUNT: 13.6 % (ref 11.7–13)
GFR SERPL CREATININE-BSD FRML MDRD: 71 ML/MIN/1.73
GLUCOSE BLD-MCNC: 91 MG/DL (ref 65–99)
HCT VFR BLD AUTO: 39.4 % (ref 35.6–45.5)
HGB BLD-MCNC: 12 G/DL (ref 11.9–15.5)
INR PPP: 3.06 (ref 0.9–1.1)
MCH RBC QN AUTO: 34.9 PG (ref 26.9–32)
MCHC RBC AUTO-ENTMCNC: 30.5 G/DL (ref 32.4–36.3)
MCV RBC AUTO: 114.5 FL (ref 80.5–98.2)
PLATELET # BLD AUTO: 142 10*3/MM3 (ref 140–500)
PMV BLD AUTO: 11.2 FL (ref 6–12)
POTASSIUM BLD-SCNC: 3.4 MMOL/L (ref 3.5–5.2)
PROTHROMBIN TIME: 30.7 SECONDS (ref 11.7–14.2)
RBC # BLD AUTO: 3.44 10*6/MM3 (ref 3.9–5.2)
SODIUM BLD-SCNC: 144 MMOL/L (ref 136–145)
WBC NRBC COR # BLD: 3.34 10*3/MM3 (ref 4.5–10.7)

## 2017-02-01 PROCEDURE — 94640 AIRWAY INHALATION TREATMENT: CPT

## 2017-02-01 PROCEDURE — 94799 UNLISTED PULMONARY SVC/PX: CPT

## 2017-02-01 PROCEDURE — 85610 PROTHROMBIN TIME: CPT | Performed by: HOSPITALIST

## 2017-02-01 PROCEDURE — 80048 BASIC METABOLIC PNL TOTAL CA: CPT | Performed by: HOSPITALIST

## 2017-02-01 PROCEDURE — 85027 COMPLETE CBC AUTOMATED: CPT | Performed by: HOSPITALIST

## 2017-02-01 PROCEDURE — 97110 THERAPEUTIC EXERCISES: CPT

## 2017-02-01 RX ORDER — POTASSIUM CHLORIDE 1.5 G/1.77G
40 POWDER, FOR SOLUTION ORAL ONCE
Status: COMPLETED | OUTPATIENT
Start: 2017-02-01 | End: 2017-02-01

## 2017-02-01 RX ORDER — IPRATROPIUM BROMIDE AND ALBUTEROL SULFATE 2.5; .5 MG/3ML; MG/3ML
3 SOLUTION RESPIRATORY (INHALATION)
Status: DISCONTINUED | OUTPATIENT
Start: 2017-02-01 | End: 2017-02-04 | Stop reason: HOSPADM

## 2017-02-01 RX ADMIN — IPRATROPIUM BROMIDE AND ALBUTEROL SULFATE 3 ML: .5; 3 SOLUTION RESPIRATORY (INHALATION) at 15:03

## 2017-02-01 RX ADMIN — PAROXETINE HYDROCHLORIDE HEMIHYDRATE 20 MG: 20 TABLET, FILM COATED ORAL at 09:53

## 2017-02-01 RX ADMIN — CARVEDILOL 6.25 MG: 6.25 TABLET, FILM COATED ORAL at 08:00

## 2017-02-01 RX ADMIN — CARVEDILOL 6.25 MG: 6.25 TABLET, FILM COATED ORAL at 18:22

## 2017-02-01 RX ADMIN — FUROSEMIDE 20 MG: 20 TABLET ORAL at 09:53

## 2017-02-01 RX ADMIN — BUDESONIDE 1 MG: 1 SUSPENSION RESPIRATORY (INHALATION) at 10:43

## 2017-02-01 RX ADMIN — IPRATROPIUM BROMIDE AND ALBUTEROL SULFATE 3 ML: .5; 3 SOLUTION RESPIRATORY (INHALATION) at 03:34

## 2017-02-01 RX ADMIN — ACETAMINOPHEN 650 MG: 325 TABLET ORAL at 18:22

## 2017-02-01 RX ADMIN — ACETAMINOPHEN 650 MG: 325 TABLET ORAL at 06:25

## 2017-02-01 RX ADMIN — LEVOTHYROXINE SODIUM 112 MCG: 112 TABLET ORAL at 08:00

## 2017-02-01 RX ADMIN — POTASSIUM CHLORIDE 40 MEQ: 1.5 POWDER, FOR SOLUTION ORAL at 18:21

## 2017-02-01 RX ADMIN — IPRATROPIUM BROMIDE AND ALBUTEROL SULFATE 3 ML: .5; 3 SOLUTION RESPIRATORY (INHALATION) at 20:10

## 2017-02-01 RX ADMIN — IPRATROPIUM BROMIDE AND ALBUTEROL SULFATE 3 ML: .5; 3 SOLUTION RESPIRATORY (INHALATION) at 07:24

## 2017-02-01 RX ADMIN — IPRATROPIUM BROMIDE AND ALBUTEROL SULFATE 3 ML: .5; 3 SOLUTION RESPIRATORY (INHALATION) at 10:43

## 2017-02-01 RX ADMIN — BUDESONIDE 1 MG: 1 SUSPENSION RESPIRATORY (INHALATION) at 20:11

## 2017-02-01 NOTE — PROGRESS NOTES
Continued Stay Note  [facility]     Patient Name: Jazmine Brown  MRN: [id number]  Today's Date: 2/1/2017    Admit Date: 1/26/2017          Discharge Plan       02/01/17 1203    Case Management/Social Work Plan    Plan [facility] vs. home with [facility]    Patient/Family In Agreement With Plan yes    Additional Comments Spoke with son Suleiman in Office.  He states that they are interested in [facility] [place].  Spoke with Sarai and she will evaluate.  Packet started and in CCP office.  CCP will continue to follow.              Discharge Codes     None            Becky S. [place], RN

## 2017-02-01 NOTE — PLAN OF CARE
Problem: Patient Care Overview (Adult)  Goal: Plan of Care Review  Outcome: Ongoing (interventions implemented as appropriate)    02/01/17 1556   Coping/Psychosocial Response Interventions   Plan Of Care Reviewed With patient   Outcome Evaluation   Outcome Summary/Follow up Plan Pt had a good work out with PT. Vital signs stable. Patient resting in bed. Complains of no pain. Vital signs stable. Will continue to monitor. 2/1/17 1557   Patient Care Overview   Progress improving       Goal: Adult Individualization and Mutuality  Outcome: Ongoing (interventions implemented as appropriate)  Goal: Discharge Needs Assessment  Outcome: Ongoing (interventions implemented as appropriate)    Problem: Respiratory Insufficiency (Adult)  Goal: Effective Ventilation  Outcome: Ongoing (interventions implemented as appropriate)    Problem: Infection, Risk/Actual (Adult)  Goal: Infection Prevention/Resolution  Outcome: Ongoing (interventions implemented as appropriate)    Problem: Fall Risk (Adult)  Goal: Absence of Falls  Outcome: Ongoing (interventions implemented as appropriate)

## 2017-02-01 NOTE — PLAN OF CARE
Problem: Patient Care Overview (Adult)  Goal: Plan of Care Review  Outcome: Ongoing (interventions implemented as appropriate)    02/01/17 0967   Coping/Psychosocial Response Interventions   Plan Of Care Reviewed With patient   Outcome Evaluation   Outcome Summary/Follow up Plan Pt doing well with no c/o this am. Pt able to ambulate without walker this session with no LOB noted.    Patient Care Overview   Progress improving

## 2017-02-01 NOTE — PROGRESS NOTES
"   LOS: 6 days   Patient Care Team:  Adarsh Duckworth MD as PCP - General    Chief Complaint: cough    Subjective     HPI Comments: Pt feeling and looking better today.       Subjective:  Symptoms:  Stable.  She reports malaise and cough.  No shortness of breath, chest pain, weakness, headache, chest pressure, anorexia, diarrhea or anxiety.    Diet:  Adequate intake.  No nausea or vomiting.    Activity level: Impaired due to weakness.    Pain:  She reports no pain.        History taken from: patient chart    Objective     Vital Signs  Temp:  [97.5 °F (36.4 °C)] 97.5 °F (36.4 °C)  Heart Rate:  [75-99] 75  Resp:  [16-24] 24  BP: (129-139)/(48-69) 129/48    Objective:  General Appearance:  Comfortable and in no acute distress.    Vital signs: (most recent): Blood pressure 129/48, pulse 75, temperature 97.5 °F (36.4 °C), temperature source Oral, resp. rate 24, height 61\" (154.9 cm), weight 128 lb 9.6 oz (58.3 kg), SpO2 95 %, not currently breastfeeding.  Vital signs are normal.  No fever.    Output: Producing urine.    Lungs:  Normal respiratory rate and normal effort.  There are wheezes (scattered expiratory).    Heart: Normal rate.  Regular rhythm.    Abdomen: Abdomen is soft.  Bowel sounds are normal.   There is no abdominal tenderness.     Extremities: There is no dependent edema.    Neurological: Patient is alert and oriented to person, place and time.    Skin:  Warm and dry.              Results Review:     I reviewed the patient's new clinical results.  I reviewed the patient's other test results and agree with the interpretation  Discussed with patient    Medication Review: reviewed    Assessment/Plan     Principal Problem:    Pneumonia  Active Problems:    Acquired hypothyroidism    Paroxysmal atrial fibrillation    Acute respiratory failure with hypoxia      Assessment:  (1. RSV bronchiolitis  2. Acute hypoxic resp failure  3. HypoT4  4. Paroxysmal Afib on Coumadin  5. Macrocytosis).     Plan:   (Continue O2 " and Natalie  Added incentive spirometry and flutter valve  Changed Rocephin/Zithro to PO Omnicef (per Pulm)--D#6 of 7 of abx therapy  B12 and folate okay  Continue to monitor INR and adjust dose as needed (hold today's dose)  BP labile  Replace K+  Looking into [facility] at [place]).       Ezequiel Sanz MD  02/01/17  4:01 PM    Time: 20min

## 2017-02-01 NOTE — PROGRESS NOTES
Dr. DIONTE Allison    [address]    2/1/2017    Patient ID:  Name:  Jazmine Brown  MRN:  [id number]  2/11/1926  89 y.o.  female            CC/Reason for visit: Follow-up for RSV bronchiolitis and acute respiratory failure    HPI: The patient is slowly improving.  She denies any new complaints today.  Her cough is slowly improving.  She still has some wheezing but cannot expectorate    Vitals:  Vitals:    02/01/17 0942 02/01/17 1043 02/01/17 1300 02/01/17 1503   BP: 139/69  129/48    BP Location: Right arm  Left arm    Patient Position: Lying  Lying    Pulse: 82 79 75 75   Resp: 20 16 16 24   Temp:   97.5 °F (36.4 °C)    TempSrc: Oral  Oral    SpO2: 93% 95% 95%    Weight:       Height:               Body mass index is 24.3 kg/(m^2).    Intake/Output Summary (Last 24 hours) at 02/01/17 1549  Last data filed at 02/01/17 1300   Gross per 24 hour   Intake    240 ml   Output    240 ml   Net      0 ml       Exam:  GEN:  No distress, appears stated age  EYES:   EOM-i, anicteric sclera bilat  ENT:    External ears/nose normal, OP clear  LUNGS: Scattered rhonchi and wheezing, but better today than yesterday, nonlabored breathing  CV:  Normal S1S2, without murmur, no edema    Scheduled meds:    budesonide 1 mg Nebulization BID - RT   carvedilol 6.25 mg Oral BID With Meals   furosemide 20 mg Oral Daily   ipratropium-albuterol 3 mL Nebulization Q4H - RT   levothyroxine 112 mcg Oral QAM   PARoxetine 20 mg Oral Daily   warfarin 3 mg Oral Daily     IV meds:                           Data Review:   I reviewed the patient's medications and new clinical results.  Lab Results   Component Value Date    CALCIUM 8.3 02/01/2017    PHOS 3.3 01/26/2017     Results from last 7 days  Lab Units 02/01/17  0731 01/31/17  0613 01/30/17  0720  01/26/17  1207   AST (SGOT) U/L  --   --   --   --  28   MAGNESIUM mg/dL  --   --   --   --  2.0   SODIUM mmol/L 144 142 140  < > 144   POTASSIUM mmol/L 3.4* 3.6 3.9  < > 4.2   CHLORIDE  mmol/L 100 101 100  < > 105   TOTAL CO2 mmol/L 30.4* 29.2* 27.1  < > 28.5   BUN mg/dL 13 12 13  < > 14   CREATININE mg/dL 0.76 0.66 0.69  < > 0.89   GLUCOSE mg/dL 91 95 106*  < > 97   CALCIUM mg/dL 8.3 8.4 8.6  < > 9.1   WBC 10*3/mm3 3.34* 4.11* 4.78  < > 3.55*   HEMOGLOBIN g/dL 12.0 12.4 12.7  < > 12.8   PLATELETS 10*3/mm3 142 127* 142  < > 166   ALT (SGPT) U/L  --   --   --   --  19   < > = values in this interval not displayed.      ASSESSMENT:   RSV bronchiolitis in an elderly, frail female  Pneumonia  Acquired hypothyroidism  Paroxysmal atrial fibrillation  Acute respiratory failure with hypoxia    PLAN:  Patient is overall improving.  Continue current supportive care.  Encourage pulmonary hygiene.  Start weaning oxygen.  Start reducing the number of nebulized treatments.  Continue nebulized Pulmicort.  Avoid systemic steroids.  Stop antibiotics on February 2.      Reuben Allison MD  2/1/2017

## 2017-02-01 NOTE — PROGRESS NOTES
Acute Care - Physical Therapy Treatment Note  [facility]     Patient Name: Jazmine Brown  : 1926  MRN: [id number]  Today's Date: 2017  Onset of Illness/Injury or Date of Surgery Date: 17     Referring Physician: Sarah    Admit Date: 2017    Visit Dx:    ICD-10-CM ICD-9-CM   1. Generalized weakness R53.1 780.79     Patient Active Problem List   Diagnosis   • Fatigue   • Acquired hypothyroidism   • Paroxysmal atrial fibrillation   • Cough   • Pneumonia   • Acute respiratory failure with hypoxia               Adult Rehabilitation Note       17 0930 17 1042 17 0951    Rehab Assessment/Intervention    Discipline physical therapist  -EW physical therapy assistant  -KIERAN physical therapy assistant  -KIERAN    Document Type therapy note (daily note)  -EW therapy note (daily note)  -KIERAN therapy note (daily note)  -KIERAN    Subjective Information agree to therapy;no complaints  -EW agree to therapy  -KIERAN agree to therapy  -KIERAN    Patient Effort, Rehab Treatment good  -EW good  -KIERAN good  -KIERAN    Precautions/Limitations oxygen therapy device and L/min  -EW fall precautions;oxygen therapy device and L/min  -KIERAN fall precautions;oxygen therapy device and L/min  -KIERAN    Recorded by [EW] Rosa Pollock, PT [KIERAN] Francisco Harris PTA [KIERAN] Francisco Harris PTA    Vital Signs    Pre SpO2 (%) 97  -EW 94  -KIERAN 90  -KIERAN    O2 Delivery Pre Treatment supplemental O2  -EW supplemental O2  -KIERAN supplemental O2  -KIERAN    Intra SpO2 (%)   89  -KIERAN    O2 Delivery Intra Treatment   supplemental O2  -KIERAN    Post SpO2 (%) 97  -EW 95  -KIERAN 92  -KIERAN    O2 Delivery Post Treatment supplemental O2  -EW supplemental O2  -KIERAN supplemental O2  -KIERAN    Pre Patient Position Supine  -EW Supine  -KIERAN Supine  -KIERAN    Intra Patient Position Standing  -EW Standing  -KIERAN Standing  -KIERAN    Post Patient Position Supine  -EW Supine  -KIERAN Supine  -KIERAN    Recorded by [EW] Rosa Pollock, PT [KIERAN] Francisco Harris PTA [KIERAN] Francisco Harris PTA    Pain Assessment     Pain Assessment No/denies pain  -EW 0-10  -KIERAN 0-10  -KIERAN    Pain Score  6  -KIERAN 10  -KIERAN    Pain Location  Abdomen  -KIERAN Throat  -KIERAN    Pain Intervention(s)  Ambulation/increased activity;Repositioned;Rest  -KIERAN Rest  -KIERAN    Response to Interventions  tolerated  -KIERAN tolerated  -KIERAN    Recorded by [EW] Rosa Pollock, PT [KIERAN] Francisco Harris PTA [KIERAN] Francisco Harris PTA    Cognitive Assessment/Intervention    Current Cognitive/Communication Assessment  functional  -KIERAN functional  -KIERAN    Orientation Status   oriented x 4  -KIERAN    Follows Commands/Answers Questions  100% of the time  -KIERAN 100% of the time  -KIERAN    Personal Safety  WNL/WFL  -KIERAN WNL/WFL  -KIERAN    Personal Safety Interventions  gait belt;fall prevention program maintained;nonskid shoes/slippers when out of bed  -KIERAN fall prevention program maintained;gait belt;nonskid shoes/slippers when out of bed  -KIERAN    Recorded by  [KIERAN] Francisco Harris PTA [KIERAN] Francisco Harris PTA    Bed Mobility, Assessment/Treatment    Bed Mobility, Assistive Device   head of bed elevated  -KIERAN    Bed Mobility, Roll Left, [place]   conditional independence  -KIERAN    Bed Mobility, Roll Right, [place]   conditional independence  -KIERAN    Bed Mobility, Scoot/Bridge, [place]   supervision required;verbal cues required  -KIERAN    Bed Mob, Supine to Sit, [place] conditional independence  -EW supervision required  -KIERAN supervision required  -KIERAN    Bed Mob, Sit to Supine, [place] conditional independence  -EW supervision required  -KIERAN supervision required  -KIERAN    Bed Mobility, Impairments  strength decreased  -KIERAN     Recorded by [EW] Rosa Pollock, PT [KIERAN] Francisco Harris PTA [KIERAN] Francisco Harris PTA    Transfer Assessment/Treatment    Transfers, Sit-Stand [place] supervision required  -EW supervision required  -KIERAN supervision required  -KIERAN    Transfers, Stand-Sit [place] supervision required  -EW supervision required  -KIERAN supervision required  -KIERAN    Transfers, Sit-Stand-Sit, Assist  Device  rolling walker  -KIERAN rolling walker  -KIERAN    Transfer, Impairments  strength decreased  -KIERAN     Recorded by [EW] Rosa Pollock, PT [KIERAN] Francisco Harris PTA [KIERAN] Francisco Harris PTA    Gait Assessment/Treatment    Gait, [place] Level verbal cues required;hand held assist;contact guard assist  -EW contact guard assist;verbal cues required  -KIERAN contact guard assist;verbal cues required;nonverbal cues required (demo/gesture)  -KIERAN    Gait, Assistive Device  rolling walker  -KIERAN rolling walker  -KIERAN    Gait, Distance (Feet) 150  -  -KIERAN 120  -KIERAN    Gait, Gait Deviations forward flexed posture;step length decreased  -EW forward flexed posture;step length decreased;stride length decreased;[place] decreased;narrow base  -KIERAN forward flexed posture;step length decreased;stride length decreased;kiana decreased;narrow base  -KIERAN    Gait, Safety Issues balance decreased during turns  -EW balance decreased during turns;step length decreased;weight-shifting ability decreased  -KIERAN balance decreased during turns;step length decreased;weight-shifting ability decreased  -KIERAN    Gait, Impairments  strength decreased;impaired balance  -KIERAN strength decreased;impaired balance  -KIERAN    Gait, Comment   cues for improved AD navigation and safety awareness.   -KIERAN    Recorded by [EW] Rosa Pollock, PT [KIERAN] Francisco Harris PTA [KIERAN] Francisco Harris PTA    Therapy Exercises    Bilateral Lower Extremities  AROM:;20 reps;supine;ankle pumps/circles;quad sets;heel slides  -KIERAN AROM:;10 reps;ankle pumps/circles;heel slides  -KIERAN    Recorded by  [KIERAN] Francisco Harris PTA [KIERAN] Francisco Harris PTA    Positioning and Restraints    Pre-Treatment Position in bed  -EW in bed  -KIERAN in bed  -KIERAN    Post Treatment Position bed  -EW bed  -KIERAN bed  -KIERAN    In Bed notified nsg;supine;call light within reach;encouraged to call for assist;exit alarm on;with nsg  -EW supine;call light within reach;encouraged to call for assist;exit alarm on  -KIERAN supine;call light within  reach;encouraged to call for assist;exit alarm on  -KIERAN    Recorded by [EW] Rosa Pollock, PT [KIERAN] Francisco Harris, PTA [KIERAN] Francisco Harris, OWEN      01/29/17 1409          Rehab Assessment/Intervention    Discipline physical therapist  -      Document Type therapy note (daily note)  -      Subjective Information agree to therapy;complains of;fatigue  -KH      Patient Effort, Rehab Treatment good  -KH      Symptoms Noted During/After Treatment shortness of breath  -      Precautions/Limitations fall precautions;oxygen therapy device and L/min  -KH      Recorded by [KH] Prema Riggs, PT      Vital Signs    Pre SpO2 (%) 93  -KH      O2 Delivery Pre Treatment supplemental O2  -KH      Intra SpO2 (%) 90  -KH      O2 Delivery Intra Treatment room air  -KH      O2 Delivery Post Treatment supplemental O2  -KH      Recorded by [KH] Prema Riggs, PT      Pain Assessment    Pain Assessment No/denies pain  -KH      Recorded by [KH] Prema Riggs, PT      Vision Assessment/Intervention    Visual Impairment WNL  -KH      Recorded by [KH] Prema Riggs, PT      Cognitive Assessment/Intervention    Current Cognitive/Communication Assessment functional  -      Orientation Status oriented x 4  -KH      Follows Commands/Answers Questions 100% of the time  -      Personal Safety WNL/WFL  -      Personal Safety Interventions fall prevention program maintained;gait belt;nonskid shoes/slippers when out of bed  -      Recorded by [KH] Prema Riggs, PT      Bed Mobility, Assessment/Treatment    Bed Mobility, Scoot/Bridge, [place] verbal cues required;contact guard assist  -      Bed Mob, Supine to Sit, [place] supervision required  -      Bed Mob, Sit to Supine, [place] not tested  -      Bed Mobility, Comment left up sitting on BSC  -      Recorded by [KH] Prema Riggs, PT      Transfer Assessment/Treatment    Transfers, Sit-Stand [place] stand by assist  -KH      Transfers, Stand-Sit [place] stand by  assist  -      Transfers, Sit-Stand-Sit, Assist Device --  -KH      Toilet Transfer, [place] contact guard assist  -KH      Recorded by [KH] Prema Riggs, PT      Gait Assessment/Treatment    Gait, [place] Level verbal cues required;contact guard assist;minimum assist (75% patient effort)  -      Gait, Assistive Device --  -      Gait, Distance (Feet) 80  -      Gait, Gait Deviations forward flexed posture;festinating;step length decreased;stride length decreased;kiana decreased;narrow base  -      Gait, Safety Issues balance decreased during turns;step length decreased;weight-shifting ability decreased  -      Gait, Impairments impaired balance  -      Gait, Comment excessive Forward flexed posture and increased respirator rate with ambulation  -KH      Recorded by [MIROSLAVA] Prema Riggs, PT      Positioning and Restraints    Pre-Treatment Position in bed  -KH      Post Treatment Position bsc  -KH      In Chair --  -KH      On BS commode sitting;call light within reach;encouraged to call for assist  -KH      Recorded by [KH] Prema Riggs, PT        User Key  (r) = Recorded By, (t) = Taken By, (c) = Cosigned By    Initials Name Effective Dates    EW Rosa Pollock, PT 12/01/15 -     MIROSLAVA Riggs, PT 06/22/16 -     KIERAN Harris, PTA 04/24/15 -                 IP PT Goals       01/26/17 1535          Bed Mobility PT LTG    Bed Mobility PT LTG, Date Established 01/26/17  -EM      Bed Mobility PT LTG, Time to Achieve 1 wk  -EM      Bed Mobility PT LTG, Activity Type all bed mobility  -EM      Bed Mobility PT LTG, [place] Level independent  -EM      Transfer Training PT LTG    Transfer Training PT LTG, Date Established 01/26/17  -EM      Transfer Training PT LTG, Time to Achieve 1 wk  -EM      Transfer Training PT LTG, Activity Type all transfers  -EM      Transfer Training PT LTG, [place] Level independent  -EM      Gait Training PT LTG    Gait Training Goal PT LTG, Date Established  01/26/17  -EM      Gait Training Goal PT LTG, Time to Achieve 1 wk  -EM      Gait Training Goal PT LTG, [place] Level supervision required  -EM      Gait Training Goal PT LTG, Distance to Achieve 200 feet   -EM        User Key  (r) = Recorded By, (t) = Taken By, (c) = Cosigned By    Initials Name Provider Type    EM Ruba Cline, PT Physical Therapist          Physical Therapy Education     Title: PT OT SLP Therapies (Done)     Topic: Physical Therapy (Done)     Point: Mobility training (Done)    Learning Progress Summary    Learner Readiness Method Response Comment Documented by Status   Patient Acceptance E VU  KIERAN 01/31/17 1106 Done    Acceptance E VU  KIERAN 01/30/17 1010 Done    Acceptance E VU   01/29/17 1412 Done    Acceptance E VU  [place] 01/28/17 2028 Done    Acceptance E,TB,D VU  RW 01/27/17 0958 Done    Acceptance E VU  EM 01/26/17 1535 Done               Point: Home exercise program (Done)    Learning Progress Summary    Learner Readiness Method Response Comment Documented by Status   Patient Acceptance E VU  KIERAN 01/31/17 1106 Done    Acceptance E VU  KIERAN 01/30/17 1010 Done    Acceptance E VU   01/29/17 1412 Done    Acceptance E VU  [place] 01/28/17 2028 Done                      User Key     Initials Effective Dates Name Provider Type Discipline    [place] 06/16/16 -  Maldonado Barba, RN Registered Nurse Nurse     12/01/15 -  Ruba Cline, PT Physical Therapist PT     06/22/16 -  Prema Riggs, PT Physical Therapist PT     04/06/16 -  Shraddha Goodson PTA Physical Therapy Assistant PT    KIERAN 04/24/15 -  Francisco Harris PTA Physical Therapy Assistant PT                    PT Recommendation and Plan  Anticipated Discharge Disposition: home with home health  Planned Therapy Interventions: gait training, home exercise program  PT Frequency: daily  Plan of Care Review  Plan Of Care Reviewed With: patient  Progress: improving  Outcome Summary/Follow up Plan: Pt doing well with no c/o this am. Pt able to  ambulate without walker this session with no LOB noted.           Outcome Measures       02/01/17 0900 01/31/17 1100 01/30/17 1000    How much help from another person do you currently need...    Turning from your back to your side while in flat bed without using bedrails? 4  -EW 4  -KIERAN 4  -KIERAN    Moving from lying on back to sitting on the side of a flat bed without bedrails? 4  -EW 3  -KIERAN 3  -KIERAN    Moving to and from a bed to a chair (including a wheelchair)? 3  -EW 3  -KIERAN 3  -KIERAN    Standing up from a chair using your arms (e.g., wheelchair, bedside chair)? 3  -EW 3  -KIERAN 3  -KIERAN    Climbing 3-5 steps with a railing? 3  -EW 3  -KIERAN 3  -KIERAN    To walk in hospital room? 3  -EW 3  -KIERAN 3  -KIERAN    AM-PAC 6 Clicks Score 20  -EW 19  -KIERAN 19  -KIERAN    Functional Assessment    Outcome Measure Options AM-PAC 6 Clicks Basic Mobility (PT)  -EW AM-PAC 6 Clicks Basic Mobility (PT)  -KIERAN AM-PAC 6 Clicks Basic Mobility (PT)  -KIERAN      01/29/17 1400          How much help from another person do you currently need...    Turning from your back to your side while in flat bed without using bedrails? 4  -KH      Moving from lying on back to sitting on the side of a flat bed without bedrails? 4  -KH      Moving to and from a bed to a chair (including a wheelchair)? 4  -KH      Standing up from a chair using your arms (e.g., wheelchair, bedside chair)? 3  -KH      Climbing 3-5 steps with a railing? 3  -KH      To walk in hospital room? 3  -KH      AM-PAC 6 Clicks Score 21  -KH      Functional Assessment    Outcome Measure Options AM-PAC 6 Clicks Basic Mobility (PT)  -KH        User Key  (r) = Recorded By, (t) = Taken By, (c) = Cosigned By    Initials Name Provider Type    EW Rosa Pollock, PT Physical Therapist    MIROSLAVA Riggs, PT Physical Therapist    KIERAN Harris, PTA Physical Therapy Assistant           Time Calculation:         PT Charges       02/01/17 0950          Time Calculation    Start Time 0930 -EW      Stop Time 0947 -EW       Time Calculation (min) 17 min  -EW      PT Received On 02/01/17  -EW      PT - Next Appointment 02/02/17  -EW        User Key  (r) = Recorded By, (t) = Taken By, (c) = Cosigned By    Initials Name Provider Type    SADIE Pollock, PT Physical Therapist          Therapy Charges for Today     Code Description Service Date Service Provider Modifiers Qty    [id number] HC PT THER PROC EA 15 MIN 2/1/2017 Rosa Pollock, PT GP 1          PT G-Codes  Outcome Measure Options: AM-PAC 6 Clicks Basic Mobility (PT)    Rosa Pollock, PT  2/1/2017

## 2017-02-02 LAB
ANION GAP SERPL CALCULATED.3IONS-SCNC: 9.8 MMOL/L
BUN BLD-MCNC: 11 MG/DL (ref 8–23)
BUN/CREAT SERPL: 15.9 (ref 7–25)
CALCIUM SPEC-SCNC: 8.6 MG/DL (ref 8.2–9.6)
CHLORIDE SERPL-SCNC: 102 MMOL/L (ref 98–107)
CO2 SERPL-SCNC: 32.2 MMOL/L (ref 22–29)
CREAT BLD-MCNC: 0.69 MG/DL (ref 0.57–1)
DEPRECATED RDW RBC AUTO: 54.6 FL (ref 37–54)
ERYTHROCYTE [DISTWIDTH] IN BLOOD BY AUTOMATED COUNT: 13.3 % (ref 11.7–13)
GFR SERPL CREATININE-BSD FRML MDRD: 80 ML/MIN/1.73
GLUCOSE BLD-MCNC: 93 MG/DL (ref 65–99)
HCT VFR BLD AUTO: 42.4 % (ref 35.6–45.5)
HGB BLD-MCNC: 13.5 G/DL (ref 11.9–15.5)
INR PPP: 2.5 (ref 0.9–1.1)
MCH RBC QN AUTO: 35.7 PG (ref 26.9–32)
MCHC RBC AUTO-ENTMCNC: 31.8 G/DL (ref 32.4–36.3)
MCV RBC AUTO: 112.2 FL (ref 80.5–98.2)
PLATELET # BLD AUTO: 164 10*3/MM3 (ref 140–500)
PMV BLD AUTO: 11.5 FL (ref 6–12)
POTASSIUM BLD-SCNC: 4 MMOL/L (ref 3.5–5.2)
PROTHROMBIN TIME: 26.2 SECONDS (ref 11.7–14.2)
RBC # BLD AUTO: 3.78 10*6/MM3 (ref 3.9–5.2)
SODIUM BLD-SCNC: 144 MMOL/L (ref 136–145)
WBC NRBC COR # BLD: 4.59 10*3/MM3 (ref 4.5–10.7)

## 2017-02-02 PROCEDURE — 94640 AIRWAY INHALATION TREATMENT: CPT

## 2017-02-02 PROCEDURE — 97110 THERAPEUTIC EXERCISES: CPT

## 2017-02-02 PROCEDURE — 85610 PROTHROMBIN TIME: CPT | Performed by: HOSPITALIST

## 2017-02-02 PROCEDURE — 94799 UNLISTED PULMONARY SVC/PX: CPT

## 2017-02-02 PROCEDURE — 85027 COMPLETE CBC AUTOMATED: CPT | Performed by: HOSPITALIST

## 2017-02-02 PROCEDURE — 80048 BASIC METABOLIC PNL TOTAL CA: CPT | Performed by: HOSPITALIST

## 2017-02-02 RX ADMIN — IPRATROPIUM BROMIDE AND ALBUTEROL SULFATE 3 ML: .5; 3 SOLUTION RESPIRATORY (INHALATION) at 14:34

## 2017-02-02 RX ADMIN — ACETAMINOPHEN 650 MG: 325 TABLET ORAL at 21:02

## 2017-02-02 RX ADMIN — FUROSEMIDE 20 MG: 20 TABLET ORAL at 08:31

## 2017-02-02 RX ADMIN — IPRATROPIUM BROMIDE AND ALBUTEROL SULFATE 3 ML: .5; 3 SOLUTION RESPIRATORY (INHALATION) at 07:18

## 2017-02-02 RX ADMIN — CARVEDILOL 6.25 MG: 6.25 TABLET, FILM COATED ORAL at 08:31

## 2017-02-02 RX ADMIN — CARVEDILOL 6.25 MG: 6.25 TABLET, FILM COATED ORAL at 19:21

## 2017-02-02 RX ADMIN — IPRATROPIUM BROMIDE AND ALBUTEROL SULFATE 3 ML: .5; 3 SOLUTION RESPIRATORY (INHALATION) at 19:38

## 2017-02-02 RX ADMIN — ACETAMINOPHEN 650 MG: 325 TABLET ORAL at 00:20

## 2017-02-02 RX ADMIN — BUDESONIDE 1 MG: 1 SUSPENSION RESPIRATORY (INHALATION) at 19:41

## 2017-02-02 RX ADMIN — LEVOTHYROXINE SODIUM 112 MCG: 112 TABLET ORAL at 08:31

## 2017-02-02 RX ADMIN — PAROXETINE HYDROCHLORIDE HEMIHYDRATE 20 MG: 20 TABLET, FILM COATED ORAL at 08:31

## 2017-02-02 NOTE — PROGRESS NOTES
Acute Care - Physical Therapy Treatment Note  [facility]     Patient Name: Jazmine Brown  : 1926  MRN: [id number]  Today's Date: 2017  Onset of Illness/Injury or Date of Surgery Date: 17     Referring Physician: Sarah    Admit Date: 2017    Visit Dx:    ICD-10-CM ICD-9-CM   1. Generalized weakness R53.1 780.79     Patient Active Problem List   Diagnosis   • Fatigue   • Acquired hypothyroidism   • Paroxysmal atrial fibrillation   • Cough   • Pneumonia   • Acute respiratory failure with hypoxia               Adult Rehabilitation Note       17 0921 17 0930 17 1042    Rehab Assessment/Intervention    Discipline physical therapy assistant  -DM physical therapist  -EW physical therapy assistant  -KIERAN    Document Type therapy note (daily note)  -DM therapy note (daily note)  -EW therapy note (daily note)  -KIERAN    Subjective Information agree to therapy  -DM agree to therapy;no complaints  -EW agree to therapy  -KIERAN    Patient Effort, Rehab Treatment good  -DM good  -EW good  -KIERAN    Precautions/Limitations oxygen therapy device and L/min;fall precautions  -DM oxygen therapy device and L/min  -EW fall precautions;oxygen therapy device and L/min  -KIERAN    Recorded by [DM] Castro Stark, PTA [EW] Rosa Pollock, PT [KIERAN] Francisco Harris PTA    Vital Signs    Pre SpO2 (%)  97  -EW 94  -KIERAN    O2 Delivery Pre Treatment  supplemental O2  -EW supplemental O2  -KIERAN    Post SpO2 (%)  97  -EW 95  -KIERAN    O2 Delivery Post Treatment  supplemental O2  -EW supplemental O2  -KIERAN    Pre Patient Position  Supine  -EW Supine  -KIERAN    Intra Patient Position  Standing  -EW Standing  -KIERAN    Post Patient Position  Supine  -EW Supine  -KIERAN    Recorded by  [EW] Rosa Pollock, PT [KIERAN] Francisco Harris PTA    Pain Assessment    Pain Assessment No/denies pain  -DM No/denies pain  -EW 0-10  -KIERAN    Pain Score   6  -KIERAN    Pain Location   Abdomen  -KIERAN    Pain Intervention(s)   Ambulation/increased  activity;Repositioned;Rest  -KIERAN    Response to Interventions   tolerated  -KIERAN    Recorded by [DM] Castro Stark PTA [EW] Rosa Pollock, PT [KIERAN] Francisco Harris PTA    Cognitive Assessment/Intervention    Current Cognitive/Communication Assessment   functional  -KIERAN    Follows Commands/Answers Questions   100% of the time  -KIERAN    Personal Safety   WNL/WFL  -KIERAN    Personal Safety Interventions   gait belt;fall prevention program maintained;nonskid shoes/slippers when out of bed  -KIERAN    Recorded by   [KIERAN] Francisco Harris PTA    Bed Mobility, Assessment/Treatment    Bed Mob, Supine to Sit, [place] conditional independence  -DM conditional independence  -EW supervision required  -KIERAN    Bed Mob, Sit to Supine, [place] conditional independence  -DM conditional independence  -EW supervision required  -KIERAN    Bed Mobility, Impairments   strength decreased  -KIERAN    Recorded by [DM] Castro Stark PTA [EW] Rosa Pollock, PT [KIERAN] Francisco Harris PTA    Transfer Assessment/Treatment    Transfers, Sit-Stand [place] supervision required  -DM supervision required  -EW supervision required  -KIERAN    Transfers, Stand-Sit [place] supervision required  -DM supervision required  -EW supervision required  -KIERAN    Transfers, Sit-Stand-Sit, Assist Device   rolling walker  -KIERAN    Transfer, Impairments   strength decreased  -KIERAN    Recorded by [DM] Castro Stark PTA [EW] Rosa Pollock, PT [KIERAN] Francisco Harris PTA    Gait Assessment/Treatment    Gait, [place] Level contact guard assist  -DM verbal cues required;hand held assist;contact guard assist  -EW contact guard assist;verbal cues required  -KIERAN    Gait, Assistive Device --   No AD  -DM  rolling walker  -KIERAN    Gait, Distance (Feet) 150  -  -  -KIERAN    Gait, Gait Deviations forward flexed posture;limb motion velocity decreased;step length decreased;stride length decreased  -DM forward flexed posture;step length decreased  -EW forward flexed  posture;step length decreased;stride length decreased;kiana decreased;narrow base  -KIERAN    Gait, Safety Issues  balance decreased during turns  -EW balance decreased during turns;step length decreased;weight-shifting ability decreased  -KIERAN    Gait, Impairments   strength decreased;impaired balance  -KIERAN    Gait, Comment fatigue limiting distance  -DM      Recorded by [DM] Castro Stark PTA [EW] Rosa Pollock, PT [KIERAN] Francisco Harris PTA    Therapy Exercises    Bilateral Lower Extremities AROM:;10 reps  -DM  AROM:;20 reps;supine;ankle pumps/circles;quad sets;heel slides  -KIERAN    Recorded by [DM] Castro Stark PTA  [KIERAN] Francisco Harris PTA    Positioning and Restraints    Pre-Treatment Position in bed  -DM in bed  -EW in bed  -KIERAN    Post Treatment Position bed  -DM bed  -EW bed  -KIERAN    In Bed supine;call light within reach;encouraged to call for assist;exit alarm on  -DM notified nsg;supine;call light within reach;encouraged to call for assist;exit alarm on;with nsg  -EW supine;call light within reach;encouraged to call for assist;exit alarm on  -KIERAN    Recorded by [DM] Castro Stark PTA [EW] Rosa Pollock, PT [KIERAN] Francisco Harris PTA      01/30/17 0951          Rehab Assessment/Intervention    Discipline physical therapy assistant  -KIERAN      Document Type therapy note (daily note)  -KIERAN      Subjective Information agree to therapy  -KIERAN      Patient Effort, Rehab Treatment good  -KIERAN      Precautions/Limitations fall precautions;oxygen therapy device and L/min  -KIERAN      Recorded by [KIERAN] Francisco Harris PTA      Vital Signs    Pre SpO2 (%) 90  -KIERAN      O2 Delivery Pre Treatment supplemental O2  -KIERAN      Intra SpO2 (%) 89  -KIERAN      O2 Delivery Intra Treatment supplemental O2  -KIERAN      Post SpO2 (%) 92  -KIERAN      O2 Delivery Post Treatment supplemental O2  -KIERAN      Pre Patient Position Supine  -KIERAN      Intra Patient Position Standing  -KIERAN      Post Patient Position Supine  -KIERAN      Recorded by [KIERAN] Francisco Harris PTA       Pain Assessment    Pain Assessment 0-10  -KIERAN      Pain Score 10  -KIERAN      Pain Location Throat  -KIERAN      Pain Intervention(s) Rest  -KIERAN      Response to Interventions tolerated  -KIERAN      Recorded by [KIERAN] Francisco Harris PTA      Cognitive Assessment/Intervention    Current Cognitive/Communication Assessment functional  -KIERAN      Orientation Status oriented x 4  -KIERAN      Follows Commands/Answers Questions 100% of the time  -KIERAN      Personal Safety WNL/WFL  -KIERAN      Personal Safety Interventions fall prevention program maintained;gait belt;nonskid shoes/slippers when out of bed  -KIERAN      Recorded by [KIERAN] Francisco Harris PTA      Bed Mobility, Assessment/Treatment    Bed Mobility, Assistive Device head of bed elevated  -KIERAN      Bed Mobility, Roll Left, [place] conditional independence  -KIERAN      Bed Mobility, Roll Right, [place] conditional independence  -KIERAN      Bed Mobility, Scoot/Bridge, [place] supervision required;verbal cues required  -KIERAN      Bed Mob, Supine to Sit, [place] supervision required  -KIERAN      Bed Mob, Sit to Supine, [place] supervision required  -KIERAN      Recorded by [KIERAN] Francisco Harris PTA      Transfer Assessment/Treatment    Transfers, Sit-Stand [place] supervision required  -KIERAN      Transfers, Stand-Sit [place] supervision required  -KIERAN      Transfers, Sit-Stand-Sit, Assist Device rolling walker  -KIERAN      Recorded by [KIERAN] Francisco Harris PTA      Gait Assessment/Treatment    Gait, [place] Level contact guard assist;verbal cues required;nonverbal cues required (demo/gesture)  -KIERAN      Gait, Assistive Device rolling walker  -KIERAN      Gait, Distance (Feet) 120  -KIERAN      Gait, Gait Deviations forward flexed posture;step length decreased;stride length decreased;kiana decreased;narrow base  -KIERAN      Gait, Safety Issues balance decreased during turns;step length decreased;weight-shifting ability decreased  -KIERAN      Gait, Impairments strength decreased;impaired balance  -KIERAN       Gait, Comment cues for improved AD navigation and safety awareness.   -KIERAN      Recorded by [KIERAN] Francisco Harris PTA      Therapy Exercises    Bilateral Lower Extremities AROM:;10 reps;ankle pumps/circles;heel slides  -KIERAN      Recorded by [KIERAN] Francisco Harris PTA      Positioning and Restraints    Pre-Treatment Position in bed  -KIERAN      Post Treatment Position bed  -KIERAN      In Bed supine;call light within reach;encouraged to call for assist;exit alarm on  -KIERAN      Recorded by [KIERAN] Francisco Harris PTA        User Key  (r) = Recorded By, (t) = Taken By, (c) = Cosigned By    Initials Name Effective Dates    DM Castro HERRERA Stark, PTA 05/18/15 -     EW Rosa Pollock, PT 12/01/15 -     KIERAN Francisco Harris, PTA 04/24/15 -                 IP PT Goals       01/26/17 1535          Bed Mobility PT LTG    Bed Mobility PT LTG, Date Established 01/26/17  -EM      Bed Mobility PT LTG, Time to Achieve 1 wk  -EM      Bed Mobility PT LTG, Activity Type all bed mobility  -EM      Bed Mobility PT LTG, [place] Level independent  -EM      Transfer Training PT LTG    Transfer Training PT LTG, Date Established 01/26/17  -EM      Transfer Training PT LTG, Time to Achieve 1 wk  -EM      Transfer Training PT LTG, Activity Type all transfers  -EM      Transfer Training PT LTG, [place] Level independent  -EM      Gait Training PT LTG    Gait Training Goal PT LTG, Date Established 01/26/17  -EM      Gait Training Goal PT LTG, Time to Achieve 1 wk  -EM      Gait Training Goal PT LTG, [place] Level supervision required  -EM      Gait Training Goal PT LTG, Distance to Achieve 200 feet   -EM        User Key  (r) = Recorded By, (t) = Taken By, (c) = Cosigned By    Initials Name Provider Type    EM Ruba Cline PT Physical Therapist          Physical Therapy Education     Title: PT OT SLP Therapies (Done)     Topic: Physical Therapy (Done)     Point: Mobility training (Done)    Learning Progress Summary    Learner Readiness Method  Response Comment Documented by Status   Patient Acceptance E,D VU  DM 02/02/17 0923 Done    Acceptance E VU  KIERAN 01/31/17 1106 Done    Acceptance E VU  KIERAN 01/30/17 1010 Done    Acceptance E VU  KH 01/29/17 1412 Done    Acceptance E VU  NJ 01/28/17 2028 Done    Acceptance E,TB,D VU  RW 01/27/17 0958 Done    Acceptance E VU  EM 01/26/17 1535 Done               Point: Home exercise program (Done)    Learning Progress Summary    Learner Readiness Method Response Comment Documented by Status   Patient Acceptance E,D VU  DM 02/02/17 0923 Done    Acceptance E VU  KIERAN 01/31/17 1106 Done    Acceptance E VU  KIERAN 01/30/17 1010 Done    Acceptance E VU  KH 01/29/17 1412 Done    Acceptance E VU  [place] 01/28/17 2028 Done                      User Key     Initials Effective Dates Name Provider Type Discipline     05/18/15 -  Castro Stark, PTA Physical Therapy Assistant PT    [place] 06/16/16 -  Maldonado Barba, RN Registered Nurse Nurse    EM 12/01/15 -  Ruba Cline, PT Physical Therapist PT     06/22/16 -  Prema Riggs, PT Physical Therapist PT    RW 04/06/16 -  Shraddha Goodson, PTA Physical Therapy Assistant PT    KIERAN 04/24/15 -  Francisco Harris, PTA Physical Therapy Assistant PT                    PT Recommendation and Plan  Anticipated Discharge Disposition: home with home health  Planned Therapy Interventions: gait training, home exercise program  PT Frequency: daily  Plan of Care Review  Plan Of Care Reviewed With: patient  Progress: no change  Outcome Summary/Follow up Plan: Gait distance limited by fatigue with activity. Unsteady but without gross LOB.          Outcome Measures       02/02/17 0900 02/01/17 0900 01/31/17 1100    How much help from another person do you currently need...    Turning from your back to your side while in flat bed without using bedrails? 4  -DM 4  -EW 4  -KIERAN    Moving from lying on back to sitting on the side of a flat bed without bedrails? 4  -DM 4  -EW 3  -KIERAN    Moving to and from a bed to a  chair (including a wheelchair)? 3  -DM 3  -EW 3  -KIERAN    Standing up from a chair using your arms (e.g., wheelchair, bedside chair)? 3  -DM 3  -EW 3  -KIERAN    Climbing 3-5 steps with a railing? 3  -DM 3  -EW 3  -KIERAN    To walk in hospital room? 3  -DM 3  -EW 3  -KIERAN    AM-PAC 6 Clicks Score 20  -DM 20  -EW 19  -KIERAN    Functional Assessment    Outcome Measure Options AM-PAC 6 Clicks Basic Mobility (PT)  -DM AM-PAC 6 Clicks Basic Mobility (PT)  -EW AM-PAC 6 Clicks Basic Mobility (PT)  -KIERAN      01/30/17 1000          How much help from another person do you currently need...    Turning from your back to your side while in flat bed without using bedrails? 4  -KIERAN      Moving from lying on back to sitting on the side of a flat bed without bedrails? 3  -KIERAN      Moving to and from a bed to a chair (including a wheelchair)? 3  -KIERAN      Standing up from a chair using your arms (e.g., wheelchair, bedside chair)? 3  -KIERAN      Climbing 3-5 steps with a railing? 3  -KIERAN      To walk in hospital room? 3  -KIERAN      AM-PAC 6 Clicks Score 19  -KIERAN      Functional Assessment    Outcome Measure Options AM-PAC 6 Clicks Basic Mobility (PT)  -KIERAN        User Key  (r) = Recorded By, (t) = Taken By, (c) = Cosigned By    Initials Name Provider Type    YUN Stark PTA Physical Therapy Assistant    EW Rosa Pollock, PT Physical Therapist    KIERAN Harris, OWEN Physical Therapy Assistant           Time Calculation:         PT Charges       02/02/17 0924          Time Calculation    Start Time 0910  -DM      Stop Time 0924  -DM      Time Calculation (min) 14 min  -DM      PT Received On 02/02/17  -DM      PT - Next Appointment 02/03/17  -DM        User Key  (r) = Recorded By, (t) = Taken By, (c) = Cosigned By    Initials Name Provider Type    YUN Stark PTA Physical Therapy Assistant          Therapy Charges for Today     Code Description Service Date Service Provider Modifiers Qty    63939431209 HC PT THER PROC EA 15 MIN 2/2/2017  Castro Stark, PTA GP 1          PT G-Codes  Outcome Measure Options: AM-PAC 6 Clicks Basic Mobility (PT)    Castro Stark, PTA  2/2/2017

## 2017-02-02 NOTE — PROGRESS NOTES
LOS: 7 days     Name: Jazmine Brown  Age/Sex: 89 y.o. female  :  1926        PCP: Adarsh Duckworth MD    Subjective   More cough today feels worse today than she did yesterday    General: No Fever or Chills, Cardiac: No Chest Pain or Palpitations, GI: No Nausea, Vomiting, or Diarrhea and Other: No bleeding      budesonide 1 mg Nebulization BID - RT   carvedilol 6.25 mg Oral BID With Meals   furosemide 20 mg Oral Daily   ipratropium-albuterol 3 mL Nebulization TID - RT   levothyroxine 112 mcg Oral QAM   PARoxetine 20 mg Oral Daily          Objective   Vital Signs  Temp:  [95.4 °F (35.2 °C)-97.6 °F (36.4 °C)] 95.4 °F (35.2 °C)  Heart Rate:  [74-84] 76  Resp:  [16-24] 20  BP: (137-149)/(70-80) 149/80  Body mass index is 24.22 kg/(m^2).    Intake/Output Summary (Last 24 hours) at 17 1310  Last data filed at 17 1158   Gross per 24 hour   Intake    380 ml   Output    300 ml   Net     80 ml       General:  Resting comfortably no active distress, ill-appearing  Eyes:  PERRL; no conj pallor; EOMI; no scleral icterus  ENT:  oral mucosa moist; pharynx w/o exudate; ext inspect WNL  Neck: nl movement; no JVD; no adenopathy; thyroid WNL  Lungs:  Bilateral expiratory wheezing scattered rhonchi worse today  Cardiac:  RRR; no murmurs;  nl S1/S2  Abd:  soft; non tender; non distended; no HSM; no masses; BS +  : deferred  Musc/Skel: no arthropathy, or deformity   Skin:  warm and perfused; no apparent rash on examined areas  Neuro: CN grossly intact; no focal deficit of strength or sensory   Ext/P Vasc:  peripheral pulses 2+; no clubbing ; no cyanosis; no edema  MS & Psychiatric: A&O x  3; memory intact; affect/mood appropriate    Results Review:       I reviewed the patient's new clinical results.    Results from last 7 days  Lab Units 17  0736 17  0731 17  0613 17  0720 17  0547 17  0731   WBC 10*3/mm3 4.59 3.34* 4.11* 4.78 5.46 3.36*   HEMOGLOBIN g/dL 13.5 12.0 12.4  12.7 12.5 12.2   PLATELETS 10*3/mm3 164 142 127* 142 135* 151       Results from last 7 days  Lab Units 02/02/17  0736 02/01/17  0731 01/31/17  0613 01/30/17  0720 01/29/17  0547 01/27/17  0731   SODIUM mmol/L 144 144 142 140 142 145   POTASSIUM mmol/L 4.0 3.4* 3.6 3.9 3.8 3.8   CHLORIDE mmol/L 102 100 101 100 101 105   TOTAL CO2 mmol/L 32.2* 30.4* 29.2* 27.1 27.2 26.2   BUN mg/dL 11 13 12 13 11 13   CREATININE mg/dL 0.69 0.76 0.66 0.69 0.73 0.85   CALCIUM mg/dL 8.6 8.3 8.4 8.6 8.6 8.5   Estimated Creatinine Clearance: 38.4 mL/min (by C-G formula based on Cr of 0.69).      Assessment/Plan   Principal Problem:    Pneumonia  Active Problems:    Acquired hypothyroidism    Paroxysmal atrial fibrillation    Acute respiratory failure with hypoxia      PLAN  1. Pneumonia: presumed bacterial superinfection completed antibiotics today   2. RSV infection:  Lungs sound worse today with decreased breath sounds on the left and poor air movement throughout.  Continues to have significant wheezing.  Continue to provide supportive care and follow.  3. Acute respiratory failure with hypoxia: presently 2 L nasal cannula titrate as appropriate  4. Hypothyroidism:  T4 within normal limits continue Synthroid at present dose    Disposition  Working towards rehabilitation tomorrow or over the weekend      Alexsander Smith MD  [facility]  02/02/17  1:10 PM      EMR Dragon/Transcription disclaimer:     Much of this encounter note is an electronic transcription/translation of spoken language to printed text. The electronic translation of spoken language may permit erroneous, or at times, nonsensical words or phrases to be inadvertently transcribed; Although I have reviewed the note for such errors, some may still exist.

## 2017-02-02 NOTE — PLAN OF CARE
Problem: Patient Care Overview (Adult)  Goal: Plan of Care Review  Outcome: Ongoing (interventions implemented as appropriate)    02/01/17 1556 02/01/17 2024 02/02/17 0037   Coping/Psychosocial Response Interventions   Plan Of Care Reviewed With --  patient --    Outcome Evaluation   Outcome Summary/Follow up Plan --  --  d/c antibx, POD, monitor inr / bp, plan d/c rehab   Patient Care Overview   Progress improving --  --        Goal: Adult Individualization and Mutuality  Outcome: Ongoing (interventions implemented as appropriate)  Goal: Discharge Needs Assessment  Outcome: Ongoing (interventions implemented as appropriate)    Problem: Respiratory Insufficiency (Adult)  Goal: Effective Ventilation  Outcome: Ongoing (interventions implemented as appropriate)    Problem: Infection, Risk/Actual (Adult)  Goal: Infection Prevention/Resolution  Outcome: Ongoing (interventions implemented as appropriate)    Problem: Fall Risk (Adult)  Goal: Absence of Falls  Outcome: Ongoing (interventions implemented as appropriate)

## 2017-02-02 NOTE — PLAN OF CARE
Problem: Patient Care Overview (Adult)  Goal: Plan of Care Review  Outcome: Ongoing (interventions implemented as appropriate)    02/02/17 7299   Coping/Psychosocial Response Interventions   Plan Of Care Reviewed With patient   Outcome Evaluation   Outcome Summary/Follow up Plan O2 required today , pt on 0.5-1L NC, pt weak , shortness of Air present, possible D/c tomorrow vs weekend to gabriela Simon MD to see this evening, pt reports coughing up scant amount bloody sputum , Dr. Oconnell Notified   Patient Care Overview   Progress declining       Goal: Discharge Needs Assessment  Outcome: Ongoing (interventions implemented as appropriate)    Problem: Respiratory Insufficiency (Adult)  Goal: Effective Ventilation  Outcome: Ongoing (interventions implemented as appropriate)    Problem: Infection, Risk/Actual (Adult)  Goal: Infection Prevention/Resolution  Outcome: Ongoing (interventions implemented as appropriate)    Problem: Fall Risk (Adult)  Goal: Absence of Falls  Outcome: Ongoing (interventions implemented as appropriate)

## 2017-02-02 NOTE — PLAN OF CARE
Problem: Patient Care Overview (Adult)  Goal: Plan of Care Review    02/02/17 0923   Coping/Psychosocial Response Interventions   Plan Of Care Reviewed With patient   Outcome Evaluation   Outcome Summary/Follow up Plan Gait distance limited by fatigue with activity. Unsteady but without gross LOB.   Patient Care Overview   Progress no change

## 2017-02-03 ENCOUNTER — APPOINTMENT (OUTPATIENT)
Dept: GENERAL RADIOLOGY | Facility: HOSPITAL | Age: 82
End: 2017-02-03
Attending: INTERNAL MEDICINE

## 2017-02-03 LAB
INR PPP: 1.96 (ref 0.9–1.1)
PROTHROMBIN TIME: 21.7 SECONDS (ref 11.7–14.2)

## 2017-02-03 PROCEDURE — 94799 UNLISTED PULMONARY SVC/PX: CPT

## 2017-02-03 PROCEDURE — 97110 THERAPEUTIC EXERCISES: CPT

## 2017-02-03 PROCEDURE — 85610 PROTHROMBIN TIME: CPT | Performed by: HOSPITALIST

## 2017-02-03 PROCEDURE — 71020 HC CHEST PA AND LATERAL: CPT

## 2017-02-03 PROCEDURE — 94640 AIRWAY INHALATION TREATMENT: CPT

## 2017-02-03 RX ORDER — WARFARIN SODIUM 4 MG/1
4 TABLET ORAL
Status: DISCONTINUED | OUTPATIENT
Start: 2017-02-03 | End: 2017-02-04 | Stop reason: HOSPADM

## 2017-02-03 RX ADMIN — CARVEDILOL 6.25 MG: 6.25 TABLET, FILM COATED ORAL at 09:07

## 2017-02-03 RX ADMIN — BUDESONIDE 1 MG: 1 SUSPENSION RESPIRATORY (INHALATION) at 20:33

## 2017-02-03 RX ADMIN — PAROXETINE HYDROCHLORIDE HEMIHYDRATE 20 MG: 20 TABLET, FILM COATED ORAL at 09:07

## 2017-02-03 RX ADMIN — IPRATROPIUM BROMIDE AND ALBUTEROL SULFATE 3 ML: .5; 3 SOLUTION RESPIRATORY (INHALATION) at 20:33

## 2017-02-03 RX ADMIN — FUROSEMIDE 20 MG: 20 TABLET ORAL at 09:07

## 2017-02-03 RX ADMIN — WARFARIN SODIUM 4 MG: 4 TABLET ORAL at 18:00

## 2017-02-03 RX ADMIN — CARVEDILOL 6.25 MG: 6.25 TABLET, FILM COATED ORAL at 17:59

## 2017-02-03 RX ADMIN — IPRATROPIUM BROMIDE AND ALBUTEROL SULFATE 3 ML: .5; 3 SOLUTION RESPIRATORY (INHALATION) at 15:28

## 2017-02-03 RX ADMIN — LEVOTHYROXINE SODIUM 112 MCG: 112 TABLET ORAL at 09:08

## 2017-02-03 RX ADMIN — ACETAMINOPHEN 650 MG: 325 TABLET ORAL at 20:35

## 2017-02-03 NOTE — PROGRESS NOTES
Acute Care - Physical Therapy Treatment Note  [facility]     Patient Name: Jazmine Brown  : 1926  MRN: [id number]  Today's Date: 2/3/2017  Onset of Illness/Injury or Date of Surgery Date: 17     Referring Physician: Sarah    Admit Date: 2017    Visit Dx:    ICD-10-CM ICD-9-CM   1. Generalized weakness R53.1 780.79     Patient Active Problem List   Diagnosis   • Fatigue   • Acquired hypothyroidism   • Paroxysmal atrial fibrillation   • Cough   • Pneumonia   • Acute respiratory failure with hypoxia               Adult Rehabilitation Note       17 0953 17 0921 17 0930    Rehab Assessment/Intervention    Discipline physical therapy assistant  -DM physical therapy assistant  -DM physical therapist  -EW    Document Type therapy note (daily note)  -DM therapy note (daily note)  -DM therapy note (daily note)  -EW    Subjective Information agree to therapy  -DM agree to therapy  -DM agree to therapy;no complaints  -EW    Patient Effort, Rehab Treatment good  -DM good  -DM good  -EW    Symptoms Noted During/After Treatment fatigue  -DM      Precautions/Limitations oxygen therapy device and L/min;fall precautions  -DM oxygen therapy device and L/min;fall precautions  -DM oxygen therapy device and L/min  -EW    Recorded by [DM] Castro Stark PTA [DM] Castro Stark PTA [EW] Rosa Pollock, PT    Vital Signs    Pre SpO2 (%)   97  -EW    O2 Delivery Pre Treatment   supplemental O2  -EW    Post SpO2 (%)   97  -EW    O2 Delivery Post Treatment   supplemental O2  -EW    Pre Patient Position   Supine  -EW    Intra Patient Position   Standing  -EW    Post Patient Position   Supine  -EW    Recorded by   [EW] Rosa Pollock, PT    Pain Assessment    Pain Assessment No/denies pain  -DM No/denies pain  -DM No/denies pain  -EW    Recorded by [DM] Castro Stark PTA [DM] Castro Stark PTA [EW] Rosa Pollock, PT    Bed Mobility, Assessment/Treatment    Bed Mob, Supine to  Sit, [place] conditional independence  -DM conditional independence  -DM conditional independence  -EW    Bed Mob, Sit to Supine, [place] conditional independence  -DM conditional independence  -DM conditional independence  -EW    Recorded by [DM] Castro Stark PTA [DM] Castro Stark PTA [EW] Rosa Pollock, PT    Transfer Assessment/Treatment    Transfers, Sit-Stand [place] supervision required  -DM supervision required  -DM supervision required  -EW    Transfers, Stand-Sit [place] supervision required  -DM supervision required  -DM supervision required  -EW    Recorded by [DM] Castro Stark PTA [DM] Castro Stark PTA [EW] Rosa Pollock, PT    Gait Assessment/Treatment    Gait, [place] Level contact guard assist;minimum assist (75% patient effort)  -DM contact guard assist  -DM verbal cues required;hand held assist;contact guard assist  -EW    Gait, Assistive Device --   HHA  -DM --   No AD  -DM     Gait, Distance (Feet)  150  -  -EW    Gait, Gait Deviations forward flexed posture;limb motion velocity decreased;step length decreased;stride length decreased  -DM forward flexed posture;limb motion velocity decreased;step length decreased;stride length decreased  -DM forward flexed posture;step length decreased  -EW    Gait, Safety Issues   balance decreased during turns  -EW    Gait, Comment fatigue limiting distance  -DM fatigue limiting distance  -DM     Recorded by [DM] Castro Stark PTA [DM] Castro Stark PTA [EW] Rosa Pollock, PT    Balance Skills Training    Sitting-Level of Assistance Distant supervision  -DM      Sitting-Balance Support Feet supported  -DM      Sitting-Balance Activities Trunk control activities  -DM      Sitting # of Minutes 4  -DM      Recorded by [DM] Castro Stark PTA      Therapy Exercises    Bilateral Lower Extremities  AROM:;10 reps  -DM     Recorded by  [YUN] Castro Stark PTA     Positioning and Restraints     Pre-Treatment Position in bed  -DM in bed  -DM in bed  -EW    Post Treatment Position bed  -DM bed  -DM bed  -EW    In Bed supine;call light within reach;encouraged to call for assist;exit alarm on  -DM supine;call light within reach;encouraged to call for assist;exit alarm on  -DM notified nsg;supine;call light within reach;encouraged to call for assist;exit alarm on;with nsg  -EW    Recorded by [DM] Castro Stark PTA [DM] Castro Stark PTA [EW] Rosa Pollock, PT      01/31/17 1042          Rehab Assessment/Intervention    Discipline physical therapy assistant  -KIERAN      Document Type therapy note (daily note)  -KIERAN      Subjective Information agree to therapy  -KIERAN      Patient Effort, Rehab Treatment good  -KIERAN      Precautions/Limitations fall precautions;oxygen therapy device and L/min  -KIERAN      Recorded by [KIERAN] Francisco Harris PTA      Vital Signs    Pre SpO2 (%) 94  -KIERAN      O2 Delivery Pre Treatment supplemental O2  -KIERAN      Post SpO2 (%) 95  -KIERAN      O2 Delivery Post Treatment supplemental O2  -KIERAN      Pre Patient Position Supine  -KIERAN      Intra Patient Position Standing  -KIERAN      Post Patient Position Supine  -KIERAN      Recorded by [KIERAN] Francisco Harris PTA      Pain Assessment    Pain Assessment 0-10  -KIERAN      Pain Score 6  -KIERAN      Pain Location Abdomen  -KIERAN      Pain Intervention(s) Ambulation/increased activity;Repositioned;Rest  -KIERAN      Response to Interventions tolerated  -KIERAN      Recorded by [KIERAN] Francisco Harris PTA      Cognitive Assessment/Intervention    Current Cognitive/Communication Assessment functional  -KIERAN      Follows Commands/Answers Questions 100% of the time  -KIERAN      Personal Safety WNL/WFL  -KIERAN      Personal Safety Interventions gait belt;fall prevention program maintained;nonskid shoes/slippers when out of bed  -KIERAN      Recorded by [KIERAN] Francisco Harris PTA      Bed Mobility, Assessment/Treatment    Bed Mob, Supine to Sit, [place] supervision required  -KIERAN      Bed Mob, Sit to  Supine, [place] supervision required  -KIERAN      Bed Mobility, Impairments strength decreased  -KIERAN      Recorded by [KIERAN] Francisco Harris PTA      Transfer Assessment/Treatment    Transfers, Sit-Stand [place] supervision required  -KIERAN      Transfers, Stand-Sit [place] supervision required  -KIERAN      Transfers, Sit-Stand-Sit, Assist Device rolling walker  -KIERAN      Transfer, Impairments strength decreased  -KIERAN      Recorded by [KIERAN] Francisco Harris PTA      Gait Assessment/Treatment    Gait, [place] Level contact guard assist;verbal cues required  -KIERAN      Gait, Assistive Device rolling walker  -KIERAN      Gait, Distance (Feet) 150  -KIERAN      Gait, Gait Deviations forward flexed posture;step length decreased;stride length decreased;k[place] decreased;narrow base  -KIERAN      Gait, Safety Issues balance decreased during turns;step length decreased;weight-shifting ability decreased  -KIERAN      Gait, Impairments strength decreased;impaired balance  -KIERAN      Recorded by [KIERAN] Francisco Harris PTA      Therapy Exercises    Bilateral Lower Extremities AROM:;20 reps;supine;ankle pumps/circles;quad sets;heel slides  -KIERAN      Recorded by [KIERAN] Francisco Harris PTA      Positioning and Restraints    Pre-Treatment Position in bed  -KIERAN      Post Treatment Position bed  -KIERAN      In Bed supine;call light within reach;encouraged to call for assist;exit alarm on  -KIERAN      Recorded by [KIERAN] Francisco Harris PTA        User Key  (r) = Recorded By, (t) = Taken By, (c) = Cosigned By    Initials Name Effective Dates    YUN Stark, PTA 05/18/15 -     EW Rosa Pollock, PT 12/01/15 -     KIERAN Harris, PTA 04/24/15 -                 IP PT Goals       01/26/17 1535          Bed Mobility PT LTG    Bed Mobility PT LTG, Date Established 01/26/17  -EM      Bed Mobility PT LTG, Time to Achieve 1 wk  -EM      Bed Mobility PT LTG, Activity Type all bed mobility  -EM      Bed Mobility PT LTG, [place] Level independent  -EM      Transfer Training  PT LTG    Transfer Training PT LTG, Date Established 01/26/17  -EM      Transfer Training PT LTG, Time to Achieve 1 wk  -EM      Transfer Training PT LTG, Activity Type all transfers  -EM      Transfer Training PT LTG, [place] Level independent  -EM      Gait Training PT LTG    Gait Training Goal PT LTG, Date Established 01/26/17  -EM      Gait Training Goal PT LTG, Time to Achieve 1 wk  -EM      Gait Training Goal PT LTG, [place] Level supervision required  -EM      Gait Training Goal PT LTG, Distance to Achieve 200 feet   -EM        User Key  (r) = Recorded By, (t) = Taken By, (c) = Cosigned By    Initials Name Provider Type    EM Ruba Cline, PT Physical Therapist          Physical Therapy Education     Title: PT OT SLP Therapies (Done)     Topic: Physical Therapy (Done)     Point: Mobility training (Done)    Learning Progress Summary    Learner Readiness Method Response Comment Documented by Status   Patient Acceptance E,D VU  DM 02/03/17 0955 Done    Acceptance E,D VU  DM 02/02/17 0923 Done    Acceptance E VU  KIERAN 01/31/17 1106 Done    Acceptance E VU  KIERAN 01/30/17 1010 Done    Acceptance E VU  KH 01/29/17 1412 Done    Acceptance E VU  NJ 01/28/17 2028 Done    Acceptance E,TB,D VU  RW 01/27/17 0958 Done    Acceptance E VU  EM 01/26/17 1535 Done               Point: Home exercise program (Done)    Learning Progress Summary    Learner Readiness Method Response Comment Documented by Status   Patient Acceptance E,D VU  DM 02/03/17 0955 Done    Acceptance E,D VU  DM 02/02/17 0923 Done    Acceptance E VU  KIERAN 01/31/17 1106 Done    Acceptance E VU  KIERAN 01/30/17 1010 Done    Acceptance E VU  KH 01/29/17 1412 Done    Acceptance E VU  NJ 01/28/17 2028 Done                      User Key     Initials Effective Dates Name Provider Type Discipline    DM 05/18/15 -  Castro Stark PTA Physical Therapy Assistant PT    NJ 06/16/16 -  Maldonado Barba RN Registered Nurse Nurse    EM 12/01/15 -  Ruba MACHUCA  Son, PT Physical Therapist PT    KH 06/22/16 -  Prema Riggs, PT Physical Therapist PT    RW 04/06/16 -  Shraddha Goodson, PTA Physical Therapy Assistant PT    KIERAN 04/24/15 -  Francisco Harris, PTA Physical Therapy Assistant PT                    PT Recommendation and Plan  Anticipated Discharge Disposition: home with home health  Planned Therapy Interventions: gait training, home exercise program  PT Frequency: daily  Plan of Care Review  Plan Of Care Reviewed With: patient  Progress: no change  Outcome Summary/Follow up Plan: Tolerated gait with moderate fatigue.          Outcome Measures       02/03/17 0900 02/02/17 0900 02/01/17 0900    How much help from another person do you currently need...    Turning from your back to your side while in flat bed without using bedrails? 4  -DM 4  -DM 4  -EW    Moving from lying on back to sitting on the side of a flat bed without bedrails? 4  -DM 4  -DM 4  -EW    Moving to and from a bed to a chair (including a wheelchair)? 3  -DM 3  -DM 3  -EW    Standing up from a chair using your arms (e.g., wheelchair, bedside chair)? 3  -DM 3  -DM 3  -EW    Climbing 3-5 steps with a railing? 3  -DM 3  -DM 3  -EW    To walk in hospital room? 3  -DM 3  -DM 3  -EW    AM-PAC 6 Clicks Score 20  -DM 20  -DM 20  -EW    Functional Assessment    Outcome Measure Options AM-PAC 6 Clicks Basic Mobility (PT)  -DM AM-PAC 6 Clicks Basic Mobility (PT)  -DM AM-PAC 6 Clicks Basic Mobility (PT)  -EW      01/31/17 1100          How much help from another person do you currently need...    Turning from your back to your side while in flat bed without using bedrails? 4  -KIERAN      Moving from lying on back to sitting on the side of a flat bed without bedrails? 3  -KIERAN      Moving to and from a bed to a chair (including a wheelchair)? 3  -KIERAN      Standing up from a chair using your arms (e.g., wheelchair, bedside chair)? 3  -KIERAN      Climbing 3-5 steps with a railing? 3  -KIERAN      To walk in hospital room? 3  -KIERAN       AM-PAC 6 Clicks Score 19  -KIERAN      Functional Assessment    Outcome Measure Options AM-PAC 6 Clicks Basic Mobility (PT)  -KIERAN        User Key  (r) = Recorded By, (t) = Taken By, (c) = Cosigned By    Initials Name Provider Type    YUN Stark PTA Physical Therapy Assistant    SADIE Pollock, PT Physical Therapist    KIERAN Harris, OWEN Physical Therapy Assistant           Time Calculation:         PT Charges       02/03/17 0955          Time Calculation    Start Time 0940  -DM      Stop Time 0955  -DM      Time Calculation (min) 15 min  -DM      PT Received On 02/03/17  -DM      PT - Next Appointment 02/04/17  -DM        User Key  (r) = Recorded By, (t) = Taken By, (c) = Cosigned By    Initials Name Provider Type    UYN Stark PTA Physical Therapy Assistant          Therapy Charges for Today     Code Description Service Date Service Provider Modifiers Qty    93163971750 HC PT THER PROC EA 15 MIN 2/2/2017 Castro Stark PTA GP 1    22436848151 HC PT THER PROC EA 15 MIN 2/3/2017 Castro Stark PTA GP 1          PT G-Codes  Outcome Measure Options: AM-PAC 6 Clicks Basic Mobility (PT)    Castro Stark PTA  2/3/2017

## 2017-02-03 NOTE — PROGRESS NOTES
Continued Stay Note  [facility]     Patient Name: Jazmine Brown  MRN: [id number]  Today's Date: 2/3/2017    Admit Date: 1/26/2017          Discharge Plan       02/03/17 1025    Case Management/Social Work Plan    Plan [facility]    Patient/Family In Agreement With Plan yes    Additional Comments Spoke with patient at bedside.  Plan remains for her to go to [facility] at [place].  Beds available over weekend per Sarai.  Packet in [place].  CCP will continue to follow.                Discharge Codes     None        Expected Discharge Date and Time     Expected Discharge Date Expected Discharge Time    Feb 3, 2017             Becky S. [place], RN

## 2017-02-03 NOTE — PROGRESS NOTES
LOS: 8 days     Name: Jazmine Brown  Age/Sex: 89 y.o. female  :  1926        PCP: Adarsh Duckworth MD    Subjective   She still feels very rough still with cough and shortness of breath especially with exertion  General: No Fever or Chills, Cardiac: No Chest Pain or Palpitations, GI: No Nausea, Vomiting, or Diarrhea and Other: No bleeding      budesonide 1 mg Nebulization BID - RT   carvedilol 6.25 mg Oral BID With Meals   furosemide 20 mg Oral Daily   ipratropium-albuterol 3 mL Nebulization TID - RT   levothyroxine 112 mcg Oral QAM   PARoxetine 20 mg Oral Daily          Objective   Vital Signs  Temp:  [94.5 °F (34.7 °C)-97.6 °F (36.4 °C)] 94.5 °F (34.7 °C)  Heart Rate:  [74-85] 85  Resp:  [18-24] 20  BP: (138-147)/(66-96) 147/96  Body mass index is 24.7 kg/(m^2).    Intake/Output Summary (Last 24 hours) at 17 0940  Last data filed at 179   Gross per 24 hour   Intake    200 ml   Output    500 ml   Net   -300 ml       General:  Resting comfortably no active distress, ill-appearing  Eyes:  PERRL; no conj pallor; EOMI; no scleral icterus  ENT:  oral mucosa moist; pharynx w/o exudate; ext inspect WNL  Neck: nl movement; no JVD; no adenopathy; thyroid WNL  Lungs:  Bilateral expiratory wheezing scattered rhonchi worse today Decreased breath sounds bilaterally  Cardiac:  RRR; no murmurs;  nl S1/S2  Abd:  soft; non tender; non distended; no HSM; no masses; BS +  : deferred  Musc/Skel: no arthropathy, or deformity   Skin:  warm and perfused; no apparent rash on examined areas  Neuro: CN grossly intact; no focal deficit of strength or sensory   Ext/P Vasc:  peripheral pulses 2+; no clubbing ; no cyanosis; no edema  MS & Psychiatric: A&O x  3; memory intact; affect/mood appropriate    Results Review:       I reviewed the patient's new clinical results.    Results from last 7 days  Lab Units 17  0736 17  0731 17  0613 17  0720 17  0547   WBC 10*3/mm3 4.59 3.34*  4.11* 4.78 5.46   HEMOGLOBIN g/dL 13.5 12.0 12.4 12.7 12.5   PLATELETS 10*3/mm3 164 142 127* 142 135*       Results from last 7 days  Lab Units 02/02/17  0736 02/01/17  0731 01/31/17  0613 01/30/17  0720 01/29/17  0547   SODIUM mmol/L 144 144 142 140 142   POTASSIUM mmol/L 4.0 3.4* 3.6 3.9 3.8   CHLORIDE mmol/L 102 100 101 100 101   TOTAL CO2 mmol/L 32.2* 30.4* 29.2* 27.1 27.2   BUN mg/dL 11 13 12 13 11   CREATININE mg/dL 0.69 0.76 0.66 0.69 0.73   CALCIUM mg/dL 8.6 8.3 8.4 8.6 8.6   Estimated Creatinine Clearance: 38.7 mL/min (by C-G formula based on Cr of 0.69).    Results from last 7 days  Lab Units 02/03/17  0532 02/02/17  0736 02/01/17  0731   INR  1.96* 2.50* 3.06*          Assessment/Plan   Principal Problem:    Pneumonia  Active Problems:    Acquired hypothyroidism    Paroxysmal atrial fibrillation    Acute respiratory failure with hypoxia      PLAN  1. Pneumonia: presumed bacterial superinfection completed antibiotics today   2. RSV infection:  Still with decreased air movement throughout left greater than right continue supportive care with breathing treatments and encourage pulmonary clearance chest x-ray reviewed  3. Acute respiratory failure with hypoxia: presently 2 L nasal cannula titrate as appropriate  4. Hypothyroidism:  T4 within normal limits continue Synthroid at present dose    Disposition  Working towards rehabilitation tomorrow or over the weekend      Alexsander Smith MD  [facility]  02/03/17  9:40 AM      EMR Dragon/Transcription disclaimer:     Much of this encounter note is an electronic transcription/translation of spoken language to printed text. The electronic translation of spoken language may permit erroneous, or at times, nonsensical words or phrases to be inadvertently transcribed; Although I have reviewed the note for such errors, some may still exist.

## 2017-02-03 NOTE — PROGRESS NOTES
LOS: 8 days   Patient Care Team:  Adarsh Duckworth MD as PCP - General    Chief Complaint:  Shortness of breath    Subjective: She states that she isn't doing very well today; but that overall, she is much better. She has a productive cough, but denies shortness of breath. Saturation is 95% on 4L NC.        Objective     Vital Signs  Temp:  [94.5 °F (34.7 °C)-97.6 °F (36.4 °C)] 94.5 °F (34.7 °C)  Heart Rate:  [74-85] 85  Resp:  [18-24] 20  BP: (138-147)/(66-96) 147/96  Body mass index is 24.7 kg/(m^2).    Intake/Output Summary (Last 24 hours) at 02/03/17 0920  Last data filed at 02/02/17 2129   Gross per 24 hour   Intake    200 ml   Output    500 ml   Net   -300 ml          Physical Exam:  General Appearance: Well-developed thin elderly white female resting comfortably in bed does not appear in acute distress. She does have a productive cough.  Eyes: Conjunctiva are clear and anicteric  ENT: Mucous membranes are moist no erythema or exudates nasal septum midline nasal membranes are little dry  Neck: No adenopathy or thyromegaly or jugular veins are little prominent  Lungs: Clear I don't hear any wheezes, but she does have some rales today. They clear a little with cough. Saturation is 95% on 4L NC. Decreased breath sounds noted in the bases and has dullness bilaterally. She has some mild thoracic kyphosis  Cardiac: Regular rate and rhythm  Abdomen: Soft no palpable organomegaly or masses  : Not examined  Musc/Skel: Grossly normal  Skin: No jaundice, no petechiae, no rashes  Neuro: Oriented very pleasant cooperative moving all extremities following commands grossly intact  Extremities/P Vascular: Clubbing cyanosis or edema palpable radial dorsalis pedis pulses  MSE: Cooperative and pleasant.        Labs:  WBC No results found for: WBCS   HGB HEMOGLOBIN   Date Value Ref Range Status   02/02/2017 13.5 11.9 - 15.5 g/dL Final   02/01/2017 12.0 11.9 - 15.5 g/dL Final      HCT HEMATOCRIT   Date Value Ref Range  Status   02/02/2017 42.4 35.6 - 45.5 % Final   02/01/2017 39.4 35.6 - 45.5 % Final      Platlets No results found for: LABPLAT     PT/INR:    PROTIME   Date Value Ref Range Status   02/03/2017 21.7 (H) 11.7 - 14.2 Seconds Final   02/02/2017 26.2 (H) 11.7 - 14.2 Seconds Final   02/01/2017 30.7 (H) 11.7 - 14.2 Seconds Final   /  INR   Date Value Ref Range Status   02/03/2017 1.96 (H) 0.90 - 1.10 Final   02/02/2017 2.50 (H) 0.90 - 1.10 Final   02/01/2017 3.06 (H) 0.90 - 1.10 Final       Sodium SODIUM   Date Value Ref Range Status   02/02/2017 144 136 - 145 mmol/L Final   02/01/2017 144 136 - 145 mmol/L Final      Potassium POTASSIUM   Date Value Ref Range Status   02/02/2017 4.0 3.5 - 5.2 mmol/L Final   02/01/2017 3.4 (L) 3.5 - 5.2 mmol/L Final      Chloride CHLORIDE   Date Value Ref Range Status   02/02/2017 102 98 - 107 mmol/L Final   02/01/2017 100 98 - 107 mmol/L Final      Bicarbonate No results found for: PLASMABICARB   BUN BUN   Date Value Ref Range Status   02/02/2017 11 8 - 23 mg/dL Final   02/01/2017 13 8 - 23 mg/dL Final      Creatinine CREATININE   Date Value Ref Range Status   02/02/2017 0.69 0.57 - 1.00 mg/dL Final   02/01/2017 0.76 0.57 - 1.00 mg/dL Final      Calcium CALCIUM   Date Value Ref Range Status   02/02/2017 8.6 8.2 - 9.6 mg/dL Final   02/01/2017 8.3 8.2 - 9.6 mg/dL Final      Magnesium No results found for: MG         pH No results found for: PHART   pO2 No results found for: PO2ART   pCO2 No results found for: YBJ9UOJ   HCO3 No results found for: CRO9JGL       budesonide 1 mg Nebulization BID - RT   carvedilol 6.25 mg Oral BID With Meals   furosemide 20 mg Oral Daily   ipratropium-albuterol 3 mL Nebulization TID - RT   levothyroxine 112 mcg Oral QAM   PARoxetine 20 mg Oral Daily          Diagnostics:  Imaging Results (last 24 hours)     Procedure Component Value Units Date/Time    XR Chest PA & Lateral [[id number]]      Updated:  02/03/17 0751        Chest x-ray reviewed there is no infiltrate  and the bilateral pleural effusions appear to be smaller than they were previously      Assessment/Plan     Patient Active Problem List   Diagnosis Code   • Fatigue R53.83   • Acquired hypothyroidism E03.9   • Paroxysmal atrial fibrillation I48.0   • Cough R05   • Pneumonia J18.9   • Acute respiratory failure with hypoxia J96.01     Impression:   #1 RSV infection unfortunately treatment is supportive and I suspect she is going to have a prolonged recovery of a number of weeks.  #2 bilateral pleural effusions present on admission looked to be smaller today on the current film  #3 respiratory failure acute hypoxemic improved but still requiring a couple of liters nasal cannula O2. Wean as tolerated.  RSV is a slow recovery process and it may take some time for her to return to her baseline function  #4 paroxysmal atrial fibrillation    #5 possible secondary bacterial infection she has completed a course of antibiotics      Plan for disposition: Okay from a pulmonary standpoint for discharge to nursing facility or home I do recommend a follow-up chest x-ray in 4-6 weeks to ensure the effusions resolve    CHERI Rahman  02/03/17  9:20 AM  Patient seen and examined the above reviewed, modified and verified by myself HERRERA Oconnell Junior, M.D.  Time:

## 2017-02-03 NOTE — PROGRESS NOTES
LOS: 7 days   Patient Care Team:  Adarsh Duckworth MD as PCP - General        Subjective: Patient notes that she has improved from when she came in but she is never felt this bad in her life she still coughing nonproductive.  Chest a little sore but no chest pain.  Warned about doesn't have a whole lot energy          Objective     Vital Signs  Temp:  [95.4 °F (35.2 °C)-97.6 °F (36.4 °C)] 97.5 °F (36.4 °C)  Heart Rate:  [74-85] 85  Resp:  [16-24] 19  BP: (137-149)/(66-82) 144/66  Body mass index is 24.22 kg/(m^2).    Intake/Output Summary (Last 24 hours) at 02/02/17 2114  Last data filed at 02/02/17 1936   Gross per 24 hour   Intake    580 ml   Output    500 ml   Net     80 ml     I/O this shift:  In: -   Out: 100 [Urine:100]    Physical Exam:  General Appearance: Well-developed thin elderly white female resting comfortably in bed does not appear in acute distress  Eyes: Conjunctiva are clear and anicteric  ENT: Mucous membranes are moist no erythema or exudates nasal septum midline nasal membranes are little dry  Neck: No adenopathy or thyromegaly or jugular veins are little prominent  Lungs: Clear I don't hear any wheezes or rales but she does have decreased breath sounds in the bases and has dullness that I think it is more than it should be in both bases.  She has some mild thoracic kyphosis  Cardiac: Regular rate and rhythm  Abdomen: Soft no palpable organomegaly or masses  : Not examined  Musc/Skel: Grossly normal  Skin: No jaundice, no petechiae, no rashes  Neuro: Oriented very pleasant cooperative moving all extremities following commands grossly intact  Extremities/P Vascular: Clubbing cyanosis or edema palpable radial dorsalis pedis pulses  MSE: She seems to be a little bit depressed        Labs:  WBC No results found for: WBCS   HGB HEMOGLOBIN   Date Value Ref Range Status   02/02/2017 13.5 11.9 - 15.5 g/dL Final   02/01/2017 12.0 11.9 - 15.5 g/dL Final   01/31/2017 12.4 11.9 - 15.5 g/dL  Final      HCT HEMATOCRIT   Date Value Ref Range Status   02/02/2017 42.4 35.6 - 45.5 % Final   02/01/2017 39.4 35.6 - 45.5 % Final   01/31/2017 39.4 35.6 - 45.5 % Final      Platlets No results found for: LABPLAT     PT/INR:    PROTIME   Date Value Ref Range Status   02/02/2017 26.2 (H) 11.7 - 14.2 Seconds Final   02/01/2017 30.7 (H) 11.7 - 14.2 Seconds Final   01/31/2017 27.1 (H) 11.7 - 14.2 Seconds Final   /  INR   Date Value Ref Range Status   02/02/2017 2.50 (H) 0.90 - 1.10 Final   02/01/2017 3.06 (H) 0.90 - 1.10 Final   01/31/2017 2.61 (H) 0.90 - 1.10 Final       Sodium SODIUM   Date Value Ref Range Status   02/02/2017 144 136 - 145 mmol/L Final   02/01/2017 144 136 - 145 mmol/L Final   01/31/2017 142 136 - 145 mmol/L Final      Potassium POTASSIUM   Date Value Ref Range Status   02/02/2017 4.0 3.5 - 5.2 mmol/L Final   02/01/2017 3.4 (L) 3.5 - 5.2 mmol/L Final   01/31/2017 3.6 3.5 - 5.2 mmol/L Final      Chloride CHLORIDE   Date Value Ref Range Status   02/02/2017 102 98 - 107 mmol/L Final   02/01/2017 100 98 - 107 mmol/L Final   01/31/2017 101 98 - 107 mmol/L Final      Bicarbonate No results found for: PLASMABICARB   BUN BUN   Date Value Ref Range Status   02/02/2017 11 8 - 23 mg/dL Final   02/01/2017 13 8 - 23 mg/dL Final   01/31/2017 12 8 - 23 mg/dL Final      Creatinine CREATININE   Date Value Ref Range Status   02/02/2017 0.69 0.57 - 1.00 mg/dL Final   02/01/2017 0.76 0.57 - 1.00 mg/dL Final   01/31/2017 0.66 0.57 - 1.00 mg/dL Final      Calcium CALCIUM   Date Value Ref Range Status   02/02/2017 8.6 8.2 - 9.6 mg/dL Final   02/01/2017 8.3 8.2 - 9.6 mg/dL Final   01/31/2017 8.4 8.2 - 9.6 mg/dL Final      Magnesium No results found for: MG         pH No results found for: PHART   pO2 No results found for: PO2ART   pCO2 No results found for: TES9CSA   HCO3 No results found for: ZEI2SAT       budesonide 1 mg Nebulization BID - RT   carvedilol 6.25 mg Oral BID With Meals   furosemide 20 mg Oral Daily    ipratropium-albuterol 3 mL Nebulization TID - RT   levothyroxine 112 mcg Oral QAM   PARoxetine 20 mg Oral Daily          Diagnostics:  Imaging Results (last 24 hours)     ** No results found for the last 24 hours. **          I looked at her admit chest x-ray after examining her and she did have some pleural effusions at that time        Assessment/Plan     Patient Active Problem List   Diagnosis Code   • Fatigue R53.83   • Acquired hypothyroidism E03.9   • Paroxysmal atrial fibrillation I48.0   • Cough R05   • Pneumonia J18.9   • Acute respiratory failure with hypoxia J96.01       Impression #1 RSV infection unfortunately treatment is supportive and I suspect she is going to have a prolonged recovery of a number of weeks.  #2 bilateral pleural effusions present on admission and seems like she may have them on exam today to get a PA and lateral chest x-ray to further evaluate   #3 respiratory failure acute hypoxemic improved but still requiring a couple of liters nasal cannula O2.  Wean as tolerated.  #4 paroxysmal atrial fibrillation line #5 possible secondary bacterial infection she has completed a course of antibiotics    Plan for disposition: Patient may be ready for nursing home transfer in the near future if her effusions have not significantly increased she could be ready tomorrow from a pulmonary standpoint for her effusions have not completely resolved she should have a follow-up chest x-ray in 4-6 weeks to ensure that they do    Dre Oconnell MD  02/02/17  9:14 PM    Time:

## 2017-02-03 NOTE — PLAN OF CARE
Problem: Patient Care Overview (Adult)  Goal: Plan of Care Review  Outcome: Ongoing (interventions implemented as appropriate)    02/02/17 2103 02/03/17 0354   Coping/Psychosocial Response Interventions   Plan Of Care Reviewed With patient --    Outcome Evaluation   Outcome Summary/Follow up Plan --  new yellow sputum, > 02 usage, cxr in am, and w/ surrogate, may restarted antibx, plan d/c rehab   Patient Care Overview   Progress --  no change       Goal: Adult Individualization and Mutuality  Outcome: Ongoing (interventions implemented as appropriate)  Goal: Discharge Needs Assessment  Outcome: Ongoing (interventions implemented as appropriate)    Problem: Respiratory Insufficiency (Adult)  Goal: Effective Ventilation  Outcome: Ongoing (interventions implemented as appropriate)    Problem: Infection, Risk/Actual (Adult)  Goal: Infection Prevention/Resolution  Outcome: Ongoing (interventions implemented as appropriate)    Problem: Fall Risk (Adult)  Goal: Absence of Falls  Outcome: Ongoing (interventions implemented as appropriate)

## 2017-02-03 NOTE — PLAN OF CARE
Problem: Patient Care Overview (Adult)  Goal: Plan of Care Review  Outcome: Ongoing (interventions implemented as appropriate)  Pt with some progress. Continues to be short of air with exertion and requires oxygen at 3 liters. Lungs remain wheezy. Potential discharge to [facility] on 2/4/17.    02/03/17 1628   Coping/Psychosocial Response Interventions   Plan Of Care Reviewed With patient       Goal: Adult Individualization and Mutuality  Outcome: Ongoing (interventions implemented as appropriate)  Goal: Discharge Needs Assessment  Outcome: Ongoing (interventions implemented as appropriate)    Problem: Respiratory Insufficiency (Adult)  Goal: Effective Ventilation  Outcome: Ongoing (interventions implemented as appropriate)    Problem: Infection, Risk/Actual (Adult)  Goal: Infection Prevention/Resolution  Outcome: Ongoing (interventions implemented as appropriate)    Problem: Fall Risk (Adult)  Goal: Absence of Falls  Outcome: Ongoing (interventions implemented as appropriate)

## 2017-02-03 NOTE — PLAN OF CARE
Problem: Patient Care Overview (Adult)  Goal: Plan of Care Review    02/03/17 0954   Coping/Psychosocial Response Interventions   Plan Of Care Reviewed With patient   Outcome Evaluation   Outcome Summary/Follow up Plan Tolerated gait with moderate fatigue.   Patient Care Overview   Progress no change

## 2017-02-04 VITALS
HEIGHT: 61 IN | RESPIRATION RATE: 20 BRPM | DIASTOLIC BLOOD PRESSURE: 76 MMHG | HEART RATE: 80 BPM | OXYGEN SATURATION: 88 % | BODY MASS INDEX: 23.41 KG/M2 | WEIGHT: 124 LBS | SYSTOLIC BLOOD PRESSURE: 149 MMHG | TEMPERATURE: 97.5 F

## 2017-02-04 LAB
ANION GAP SERPL CALCULATED.3IONS-SCNC: 9.4 MMOL/L
BACTERIA UR QL AUTO: ABNORMAL /HPF
BASOPHILS # BLD AUTO: 0.02 10*3/MM3 (ref 0–0.2)
BASOPHILS NFR BLD AUTO: 0.3 % (ref 0–1.5)
BILIRUB UR QL STRIP: NEGATIVE
BUN BLD-MCNC: 13 MG/DL (ref 8–23)
BUN/CREAT SERPL: 17.6 (ref 7–25)
CALCIUM SPEC-SCNC: 8.9 MG/DL (ref 8.2–9.6)
CHLORIDE SERPL-SCNC: 102 MMOL/L (ref 98–107)
CLARITY UR: CLEAR
CO2 SERPL-SCNC: 31.6 MMOL/L (ref 22–29)
COLOR UR: YELLOW
CREAT BLD-MCNC: 0.74 MG/DL (ref 0.57–1)
DEPRECATED RDW RBC AUTO: 53 FL (ref 37–54)
EOSINOPHIL # BLD AUTO: 0.01 10*3/MM3 (ref 0–0.7)
EOSINOPHIL NFR BLD AUTO: 0.2 % (ref 0.3–6.2)
ERYTHROCYTE [DISTWIDTH] IN BLOOD BY AUTOMATED COUNT: 13 % (ref 11.7–13)
GFR SERPL CREATININE-BSD FRML MDRD: 74 ML/MIN/1.73
GLUCOSE BLD-MCNC: 103 MG/DL (ref 65–99)
GLUCOSE UR STRIP-MCNC: NEGATIVE MG/DL
HCT VFR BLD AUTO: 44.1 % (ref 35.6–45.5)
HGB BLD-MCNC: 14 G/DL (ref 11.9–15.5)
HGB UR QL STRIP.AUTO: NEGATIVE
HYALINE CASTS UR QL AUTO: ABNORMAL /LPF
IMM GRANULOCYTES # BLD: 0.02 10*3/MM3 (ref 0–0.03)
IMM GRANULOCYTES NFR BLD: 0.3 % (ref 0–0.5)
INR PPP: 1.63 (ref 0.9–1.1)
KETONES UR QL STRIP: NEGATIVE
LEUKOCYTE ESTERASE UR QL STRIP.AUTO: ABNORMAL
LYMPHOCYTES # BLD AUTO: 0.97 10*3/MM3 (ref 0.9–4.8)
LYMPHOCYTES NFR BLD AUTO: 15.4 % (ref 19.6–45.3)
MCH RBC QN AUTO: 35.4 PG (ref 26.9–32)
MCHC RBC AUTO-ENTMCNC: 31.7 G/DL (ref 32.4–36.3)
MCV RBC AUTO: 111.6 FL (ref 80.5–98.2)
MONOCYTES # BLD AUTO: 0.34 10*3/MM3 (ref 0.2–1.2)
MONOCYTES NFR BLD AUTO: 5.4 % (ref 5–12)
NEUTROPHILS # BLD AUTO: 4.95 10*3/MM3 (ref 1.9–8.1)
NEUTROPHILS NFR BLD AUTO: 78.4 % (ref 42.7–76)
NITRITE UR QL STRIP: NEGATIVE
PH UR STRIP.AUTO: 7.5 [PH] (ref 5–8)
PLATELET # BLD AUTO: 211 10*3/MM3 (ref 140–500)
PMV BLD AUTO: 11 FL (ref 6–12)
POTASSIUM BLD-SCNC: 4.5 MMOL/L (ref 3.5–5.2)
PROT UR QL STRIP: NEGATIVE
PROTHROMBIN TIME: 18.8 SECONDS (ref 11.7–14.2)
RBC # BLD AUTO: 3.95 10*6/MM3 (ref 3.9–5.2)
RBC # UR: ABNORMAL /HPF
REF LAB TEST METHOD: ABNORMAL
SODIUM BLD-SCNC: 143 MMOL/L (ref 136–145)
SP GR UR STRIP: <=1.005 (ref 1–1.03)
SQUAMOUS #/AREA URNS HPF: ABNORMAL /HPF
UROBILINOGEN UR QL STRIP: ABNORMAL
WBC NRBC COR # BLD: 6.31 10*3/MM3 (ref 4.5–10.7)
WBC UR QL AUTO: ABNORMAL /HPF

## 2017-02-04 PROCEDURE — 81001 URINALYSIS AUTO W/SCOPE: CPT | Performed by: HOSPITALIST

## 2017-02-04 PROCEDURE — 80048 BASIC METABOLIC PNL TOTAL CA: CPT | Performed by: HOSPITALIST

## 2017-02-04 PROCEDURE — 94640 AIRWAY INHALATION TREATMENT: CPT

## 2017-02-04 PROCEDURE — 94799 UNLISTED PULMONARY SVC/PX: CPT

## 2017-02-04 PROCEDURE — 85610 PROTHROMBIN TIME: CPT | Performed by: HOSPITALIST

## 2017-02-04 PROCEDURE — 87086 URINE CULTURE/COLONY COUNT: CPT | Performed by: HOSPITALIST

## 2017-02-04 PROCEDURE — 85025 COMPLETE CBC W/AUTO DIFF WBC: CPT | Performed by: HOSPITALIST

## 2017-02-04 RX ORDER — ACETAMINOPHEN 325 MG/1
650 TABLET ORAL EVERY 4 HOURS PRN
Refills: 0
Start: 2017-02-04 | End: 2017-06-23

## 2017-02-04 RX ORDER — BUDESONIDE 1 MG/2ML
1 INHALANT ORAL
Start: 2017-02-04 | End: 2017-03-08 | Stop reason: SDUPTHER

## 2017-02-04 RX ORDER — ALBUTEROL SULFATE 2.5 MG/3ML
2.5 SOLUTION RESPIRATORY (INHALATION) EVERY 6 HOURS PRN
Refills: 12
Start: 2017-02-04 | End: 2017-03-01

## 2017-02-04 RX ADMIN — CARVEDILOL 6.25 MG: 6.25 TABLET, FILM COATED ORAL at 09:01

## 2017-02-04 RX ADMIN — IPRATROPIUM BROMIDE AND ALBUTEROL SULFATE 3 ML: .5; 3 SOLUTION RESPIRATORY (INHALATION) at 07:25

## 2017-02-04 RX ADMIN — LEVOTHYROXINE SODIUM 112 MCG: 112 TABLET ORAL at 09:01

## 2017-02-04 RX ADMIN — ACETAMINOPHEN 650 MG: 325 TABLET ORAL at 09:01

## 2017-02-04 RX ADMIN — PAROXETINE HYDROCHLORIDE HEMIHYDRATE 20 MG: 20 TABLET, FILM COATED ORAL at 09:01

## 2017-02-04 RX ADMIN — FUROSEMIDE 20 MG: 20 TABLET ORAL at 09:01

## 2017-02-04 NOTE — THERAPY DISCHARGE NOTE
Acute Care - Physical Therapy Discharge Summary  [facility]       Patient Name: Jazmine Brown  : 1926  MRN: [id number]    Today's Date: 2017  Onset of Illness/Injury or Date of Surgery Date: 17       Referring Physician: Sarah      Admit Date: 2017      PT Recommendation and Plan    Visit Dx:    ICD-10-CM ICD-9-CM   1. Generalized weakness R53.1 780.79             Outcome Measures       17 0900 17 0900       How much help from another person do you currently need...    Turning from your back to your side while in flat bed without using bedrails? 4  -DM 4  -DM     Moving from lying on back to sitting on the side of a flat bed without bedrails? 4  -DM 4  -DM     Moving to and from a bed to a chair (including a wheelchair)? 3  -DM 3  -DM     Standing up from a chair using your arms (e.g., wheelchair, bedside chair)? 3  -DM 3  -DM     Climbing 3-5 steps with a railing? 3  -DM 3  -DM     To walk in hospital room? 3  -DM 3  -DM     AM-PAC 6 Clicks Score 20  -DM 20  -DM     Functional Assessment    Outcome Measure Options AM-PAC 6 Clicks Basic Mobility (PT)  -DM AM-PAC 6 Clicks Basic Mobility (PT)  -DM       User Key  (r) = Recorded By, (t) = Taken By, (c) = Cosigned By    Initials Name Provider Type    YUN Stark, OWEN Physical Therapy Assistant                      IP PT Goals       17 1441 17 1535       Bed Mobility PT LTG    Bed Mobility PT LTG, Date Established  17  -EM     Bed Mobility PT LTG, Time to Achieve  1 wk  -EM     Bed Mobility PT LTG, Activity Type  all bed mobility  -EM     Bed Mobility PT LTG, Russell Level  independent  -EM     Bed Mobility PT LTG, Date Goal Reviewed 17  -KIERAN      Bed Mobility PT LTG, Outcome goal met  -KIERAN      Transfer Training PT LTG    Transfer Training PT LTG, Date Established  17  -EM     Transfer Training PT LTG, Time to Achieve  1 wk  -EM     Transfer Training PT LTG, Activity Type  all transfers   -EM     Transfer Training PT LTG, [place] Level  independent  -EM     Transfer Training PT  LTG, Date Goal Reviewed 02/04/17  -KIERAN      Transfer Training PT LTG, Outcome goal partially met  -KIERAN      Transfer Training PT LTG, Reason Goal Not Met discharged from facility  -KIERAN      Gait Training PT LTG    Gait Training Goal PT LTG, Date Established  01/26/17  -EM     Gait Training Goal PT LTG, Time to Achieve  1 wk  -EM     Gait Training Goal PT LTG, [place] Level  supervision required  -EM     Gait Training Goal PT LTG, Distance to Achieve  200 feet   -EM     Gait Training Goal PT LTG, Date Goal Reviewed 02/04/17  -KIERAN      Gait Training Goal PT LTG, Outcome goal partially met  -KIERAN      Gait Training Goal PT LTG, Reason Goal Not Met discharged from facility  -KIERAN        User Key  (r) = Recorded By, (t) = Taken By, (c) = Cosigned By    Initials Name Provider Type    SANDY Cline, PT Physical Therapist    KIERAN Harris, PTA Physical Therapy Assistant              PT Discharge Summary  Reason for Discharge: Discharge from facility  Outcomes Achieved: Patient able to partially acheive established goals  Discharge Destination: SNF      Francisco Harris PTA   2/4/2017

## 2017-02-04 NOTE — PLAN OF CARE
Problem: Patient Care Overview (Adult)  Goal: Plan of Care Review  Outcome: Ongoing (interventions implemented as appropriate)    02/04/17 0201   Coping/Psychosocial Response Interventions   Plan Of Care Reviewed With patient   Outcome Evaluation   Outcome Summary/Follow up Plan Required an additional L of O2 overnight (3L total). Expects to be dischaged to rehab this weekend   Patient Care Overview   Progress progress toward functional goals as expected       Goal: Discharge Needs Assessment  Outcome: Ongoing (interventions implemented as appropriate)    02/04/17 0201   Discharge Needs Assessment   Concerns To Be Addressed no discharge needs identified         Problem: Respiratory Insufficiency (Adult)  Intervention: Provide Oxygenation/Ventilation/Perfusion Support    02/04/17 0201   Activity   Activity Type activity adjusted per tolerance   Safety Interventions   Medication Review/Management medications reviewed       Intervention: Optimize/Manage Energy Expenditure    02/04/17 0201   Pain/Comfort/Sleep Interventions   Sleep/Rest Enhancement awakenings minimized;regular sleep/rest pattern promoted         Goal: Effective Ventilation  Outcome: Ongoing (interventions implemented as appropriate)    02/04/17 0201   Respiratory Insufficiency (Adult)   Effective Ventilation making progress toward outcome         Problem: Infection, Risk/Actual (Adult)  Intervention: Manage Suspected/Actual Infection    02/04/17 0201   Safety Interventions   Isolation Precautions contact precautions maintained;droplet precautions maintained       Intervention: Prevent Infection/Maximize Resistance    02/04/17 0201   Pain/Comfort/Sleep Interventions   Sleep/Rest Enhancement awakenings minimized;regular sleep/rest pattern promoted         Goal: Infection Prevention/Resolution  Outcome: Ongoing (interventions implemented as appropriate)    02/04/17 0201   Infection, Risk/Actual (Adult)   Infection Prevention/Resolution making progress  toward outcome         Problem: Fall Risk (Adult)  Intervention: Monitor/Assist with Self Care    02/04/17 0201   Activity   Activity Type activity adjusted per tolerance       Intervention: Reduce Risk/Promote Restraint Free Environment    02/04/17 0201   Safety Interventions   Safety/Security Measures bed alarm set   Environmental Safety Modification assistive device/personal items within reach;clutter free environment maintained;room near unit station;room organization consistent   Restraint Interventions   Safety Promotion/Fall Prevention safety round/check completed;fall prevention program maintained;nonskid shoes/slippers when out of bed;supervised activity       Intervention: Review Medications/Identify Contributors to Fall Risk    02/04/17 0201   Safety Interventions   Medication Review/Management medications reviewed         Goal: Absence of Falls  Outcome: Ongoing (interventions implemented as appropriate)    02/04/17 0201   Fall Risk (Adult)   Absence of Falls making progress toward outcome

## 2017-02-04 NOTE — DISCHARGE SUMMARY
Date of Admission: 1/26/2017  Date of Discharge:  2/4/2017    PCP: Adarsh Duckworth MD      DISCHARGE DIAGNOSIS  Principal Problem:    Pneumonia  Active Problems:    Acquired hypothyroidism    Paroxysmal atrial fibrillation    Acute respiratory failure with hypoxia      SECONDARY DIAGNOSES  Past Medical History   Diagnosis Date   • Polyp, sigmoid colon    • Thyroid cancer        CONSULTS   Consults     Date and Time Order Name Status Description    1/29/2017 0912 Inpatient Consult to Pulmonology Completed           PROCEDURES PERFORMED  Chest x-ray:  1/26/17-  There is moderate cardiomegaly. There is a small to moderate size left  pleural effusion. Minimal right pleural effusion is suspected.  No focal infiltrates are seen.  Cardiac pacemaker is seen in good position.  No pneumothorax is seen.    2/3/17-  Heart size is mildly enlarged. There is a small left pleural  effusion. There may be some minimal pleural fluid on the right. No focal  infiltrates are seen. No pneumothorax is seen. Cardiac pacemaker is seen  in good position.      HOSPITAL COURSE  Patient is a 89 y.o. female presented to [facility] complaining of shortness of breath and cough.  Please see the admitting history and physical for further details.  She was hypoxic and her primary care physician's office and was direct admitted to the hospital for community-acquired pneumonia.  She was started on Rocephin and azithromycin and completed her course of therapy for community-acquired pneumonia while here in the hospital.  Respiratory viral panel was positive for RSV.  She was started on breathing treatments and supportive care.  She continued to have difficulty with pulmonary clearance and clinically worsened.  Pulmonary was consult to.  She was started on Pulmicort nebulized treatments and other breathing treatments were adjusted.  Chest x-ray was repeated and did show mild bilateral pleural effusions.  She slowly improved over the  "last few days her breath sounds are improved she still has lots of rhonchi and minimal expiratory wheeze on exam.  She's been titrated down to room air but still has some room to go to return to baseline.  Recommendations are for her to go to skilled nursing facility to continue breathing treatments and supportive care.  Pulmonary agreed with discharge and recommended follow-up imaging in 6 weeks to evaluate for resolution of pleural effusions.  Otherwise at the time of discharge she remains hemodynamically stable is agreeable for discharge today.  Her INR at the time of discharge is subtherapeutic.  Her Coumadin was restarted 2 days ago I expect this to begin to rise.  Recheck INR on Monday and adjust dosing if needed.    **UPDATE**Prior to DC she complained of urinary frequency and with her history of UTI's in the past I did order a UA will call tomorrow with the results and order antibiotic if needed.  Also in attempt to wean O2 for Dc she did drop to 87%.  Will send on 2L NC and transfer by ambulance and recommend continue to wean while in rehab.      CONDITION ON DISCHARGE  Stable.      VITAL SIGNS  Visit Vitals   • /76 (BP Location: Left arm, Patient Position: Sitting)   • Pulse 84   • Temp 97.8 °F (36.6 °C) (Oral)   • Resp 20   • Ht 61\" (154.9 cm)   • Wt 124 lb (56.2 kg)   • LMP  (LMP Unknown)   • SpO2 90%   • BMI 23.43 kg/m2     Objective:  General Appearance:  Comfortable (Still dyspneic with minimal exertion).    Vital signs: (most recent): Blood pressure 149/76, pulse 84, temperature 97.8 °F (36.6 °C), temperature source Oral, resp. rate 20, height 61\" (154.9 cm), weight 124 lb (56.2 kg), SpO2 90 %, not currently breastfeeding.  Vital signs are normal.    Output: Producing urine.    HEENT: Normal HEENT exam.  (Still with some conjunctival injection)    Lungs:  Normal respiratory rate and normal effort.  (Improved aeration today with better air movement.  Minimal expiratory wheeze bilateral " expiratory rhonchi)  Heart: Normal rate.  Regular rhythm.  S1 normal.    Abdomen: Abdomen is soft.  Bowel sounds are normal.   There is no abdominal tenderness.     Extremities: Normal range of motion.    Pulses: Distal pulses are intact.    Neurological: Patient is alert and oriented to person, place and time.                DISCHARGE DISPOSITION   Skilled Nursing Facility (DC - External)      DISCHARGE MEDICATIONS   Jazmine Brown   Home Medication Instructions ANA CRISTINA:[id number]    Printed on:02/04/17 1012   Medication Information                      acetaminophen (TYLENOL) 325 MG tablet  Take 2 tablets by mouth Every 4 (Four) Hours As Needed for mild pain (1-3).             albuterol (PROAIR HFA) 108 (90 BASE) MCG/ACT inhaler  Inhale 2 puffs every 4 (four) hours as needed.             albuterol (PROVENTIL) (2.5 MG/3ML) 0.083% nebulizer solution  Take 2.5 mg by nebulization Every 6 (Six) Hours As Needed for shortness of air.             azelastine (ASTELIN) 0.1 % nasal spray               budesonide (PULMICORT) 1 MG/2ML nebulizer solution  Take 2 mL by nebulization 2 (Two) Times a Day.             carvedilol (COREG) 6.25 MG tablet  Take 1 tablet by mouth 2 (Two) Times a Day With Meals.             furosemide (LASIX) 40 MG tablet  Take 0.5 tablets by mouth daily.             HYDROcodone-homatropine (HYCODAN) 5-1.5 MG/5ML syrup  Take 5 mL by mouth Every 4 (Four) Hours As Needed for cough for up to 3 days.             levothyroxine (SYNTHROID, LEVOTHROID) 112 MCG tablet  Take 1 tablet by mouth Daily.             PARoxetine (PAXIL) 20 MG tablet  Take 1 tablet by mouth Daily.             vitamin D (ERGOCALCIFEROL) 90227 UNITS capsule capsule  TAKE ONE CAPSULE BY MOUTH ONCE A WEEK             warfarin (COUMADIN) 3 MG tablet  Take two tablets on Tuesday, Thursday and Saturday, and one table Monday, Wednesday, Friday and Sunday.               Diet Instructions     Diet: Regular; Thin Liquids, No Restrictions        Discharge Diet:  Regular   Fluid Consistency:  Thin Liquids, No Restrictions                Activity Instructions     Activity as Tolerated                 No future appointments.  Referrals and Follow-ups to Schedule     Additional Follow-Up    As directed    Follow up with Adarsh Duckworth MD in 2-4 weeks   Follow Up Details:  2-4 weeks       Follow-Up    As directed    Follow up with Dr Allison in 6 weeks with repeat CXR   Follow Up Details:  6 weeks             Follow-up Information     Follow up with [facility] .    Specialty:  Skilled Nursing Facility    Contact information:    [address]   [place] [address] [phone]        Follow up with Adarsh Duckworth MD .    Specialty:  Family Medicine    Contact information:    [address]  [address]  [place] [facility] [address] [phone]            TEST  RESULTS PENDING AT DISCHARGE         Alexsander Smith MD  [facility]  02/04/17  10:18 AM      Time: greater than 30 minutes.      Dragon disclaimer:  Much of this encounter note is an electronic transcription/translation of spoken language to printed text. The electronic translation of spoken language may permit erroneous, or at times, nonsensical words or phrases to be inadvertently transcribed; Although I have reviewed the note for such errors, some may still exist.

## 2017-02-04 NOTE — PLAN OF CARE
Problem: Inpatient Physical Therapy  Goal: Bed Mobility Goal LTG- PT  Outcome: Outcome(s) achieved Date Met:  02/04/17 02/04/17 1441   Bed Mobility PT LTG   Bed Mobility PT LTG, Date Goal Reviewed 02/04/17   Bed Mobility PT LTG, Outcome goal met       Goal: Transfer Training Goal 1 LTG- PT  Outcome: Unable to achieve outcome(s) by discharge Date Met:  02/04/17 02/04/17 1441   Transfer Training PT LTG   Transfer Training PT LTG, Date Goal Reviewed 02/04/17   Transfer Training PT LTG, Outcome goal partially met   Transfer Training PT LTG, Reason Goal Not Met discharged from facility       Goal: Gait Training Goal LTG- PT  Outcome: Unable to achieve outcome(s) by discharge Date Met:  02/04/17 02/04/17 1441   Gait Training PT LTG   Gait Training Goal PT LTG, Date Goal Reviewed 02/04/17   Gait Training Goal PT LTG, Outcome goal partially met   Gait Training Goal PT LTG, Reason Goal Not Met discharged from facility

## 2017-02-06 LAB — BACTERIA SPEC AEROBE CULT: NORMAL

## 2017-03-01 ENCOUNTER — OFFICE VISIT (OUTPATIENT)
Dept: FAMILY MEDICINE CLINIC | Facility: CLINIC | Age: 82
End: 2017-03-01

## 2017-03-01 VITALS
WEIGHT: 122 LBS | TEMPERATURE: 98 F | HEART RATE: 82 BPM | OXYGEN SATURATION: 93 % | RESPIRATION RATE: 20 BRPM | SYSTOLIC BLOOD PRESSURE: 110 MMHG | BODY MASS INDEX: 23.03 KG/M2 | DIASTOLIC BLOOD PRESSURE: 70 MMHG | HEIGHT: 61 IN

## 2017-03-01 DIAGNOSIS — J18.9 PNEUMONIA DUE TO INFECTIOUS ORGANISM, UNSPECIFIED LATERALITY, UNSPECIFIED PART OF LUNG: ICD-10-CM

## 2017-03-01 DIAGNOSIS — E03.9 ACQUIRED HYPOTHYROIDISM: ICD-10-CM

## 2017-03-01 DIAGNOSIS — R53.82 CHRONIC FATIGUE: ICD-10-CM

## 2017-03-01 DIAGNOSIS — R05.9 COUGH: ICD-10-CM

## 2017-03-01 DIAGNOSIS — Z92.89 PERSONAL HISTORY OF OTHER MEDICAL TREATMENT: ICD-10-CM

## 2017-03-01 DIAGNOSIS — Z12.39 BREAST CANCER SCREENING: ICD-10-CM

## 2017-03-01 DIAGNOSIS — I48.0 PAROXYSMAL ATRIAL FIBRILLATION (HCC): Primary | ICD-10-CM

## 2017-03-01 DIAGNOSIS — Z12.31 ENCOUNTER FOR SCREENING MAMMOGRAM FOR HIGH-RISK PATIENT: ICD-10-CM

## 2017-03-01 PROCEDURE — 99213 OFFICE O/P EST LOW 20 MIN: CPT | Performed by: FAMILY MEDICINE

## 2017-03-01 PROCEDURE — 71020 XR CHEST PA AND LATERAL: CPT | Performed by: FAMILY MEDICINE

## 2017-03-01 RX ORDER — ACETAMINOPHEN AND CODEINE PHOSPHATE 300; 30 MG/1; MG/1
TABLET ORAL
COMMUNITY
Start: 2017-02-28 | End: 2017-03-01

## 2017-03-01 RX ORDER — HYDROXYZINE HYDROCHLORIDE 10 MG/1
10 TABLET, FILM COATED ORAL NIGHTLY PRN
Qty: 20 TABLET | Refills: 2 | Status: SHIPPED | OUTPATIENT
Start: 2017-03-01 | End: 2017-10-18

## 2017-03-01 RX ORDER — DEXTROMETHORPHAN POLISTIREX 30 MG/5ML
30 SUSPENSION ORAL 2 TIMES DAILY PRN
Qty: 148 ML | Refills: 5 | Status: SHIPPED | OUTPATIENT
Start: 2017-03-01 | End: 2017-06-23

## 2017-03-01 RX ORDER — ALBUTEROL SULFATE 90 UG/1
2 AEROSOL, METERED RESPIRATORY (INHALATION) EVERY 4 HOURS PRN
Qty: 18 G | Refills: 5 | Status: SHIPPED | OUTPATIENT
Start: 2017-03-01 | End: 2017-10-18 | Stop reason: SDUPTHER

## 2017-03-01 NOTE — PROGRESS NOTES
Procedure   Procedures   X Ray report:    To further evaluate this patient in follow-up from her recent hospitalization for pneumonia, I have requested a chest x-ray today.  I compared it to the chest x-ray done in the hospital.  It shows scoliosis and cardiomegaly and atherosclerotic vascular disease in the aorta and a small bilateral pleural effusion, but no pneumonia.

## 2017-03-01 NOTE — PROGRESS NOTES
Subjective   Jazmine Brown is a 89 y.o. female presenting with   Chief Complaint   Patient presents with   • Follow-up     hospital f/u    • Med Refill     proair inhaler    and something for cough without codiene    • Wheezing   • Shortness of Breath   • Insomnia        HPI Comments: Jazmine is a 89-year-old  white female nonsmoker who is here for follow-up after a recent admission for hypoxia and bilateral pleural effusion with suspected bilateral infiltrate.  She became quite hypoxic when a discharge from the hospital was attempted and she was sent to rehabilitation instead.  She is here for follow-up after discharge from the rehabilitation hospital.  Her main complaint is continued shortness of breath and cough and inability to sleep because of coughing.    She does have a history of atrial fibrillation but not of congestive heart failure.  She is not currently on antibiotics and she currently does not have a fever but continues to wheeze and feels short of breath with any exertion.  Surgeon saturation today on room air is 93%    Wheezing    Associated symptoms include coughing and shortness of breath.   Shortness of Breath   Associated symptoms include wheezing.   Insomnia   Associated symptoms include coughing and fatigue.        The following portions of the patient's history were reviewed and updated as appropriate: current medications, past family history, past medical history, past social history, past surgical history and problem list.    Review of Systems   Constitutional: Positive for fatigue.   Respiratory: Positive for cough, shortness of breath and wheezing.    Psychiatric/Behavioral: Positive for sleep disturbance. The patient has insomnia.    All other systems reviewed and are negative.      Objective   Physical Exam   Constitutional: She is oriented to person, place, and time. She appears well-developed and well-nourished. Distressed: she is mildly short of breath even at rest.   HENT:    Head: Normocephalic and atraumatic.   Eyes: EOM are normal. Pupils are equal, round, and reactive to light.   Neck: Normal range of motion. Neck supple. No JVD present. No thyromegaly present.   Cardiovascular: Normal rate.  An irregularly irregular rhythm present.   Murmur heard.   Systolic murmur is present with a grade of 1/6   Pulmonary/Chest: Respiratory distress: mildly dyspneic at rest. She has no decreased breath sounds. She has wheezes in the right upper field and the left upper field. She has rhonchi in the right upper field, the right middle field and the left upper field. She has rales in the right upper field, the right middle field, the right lower field, the left upper field, the left middle field and the left lower field. She exhibits no tenderness.   Musculoskeletal: Normal range of motion. She exhibits no edema.   Lymphadenopathy:     She has no cervical adenopathy.   Neurological: She is alert and oriented to person, place, and time.   Skin: Skin is warm and dry.   Psychiatric: She has a normal mood and affect. Her behavior is normal.   Nursing note and vitals reviewed.      Assessment/Plan   Jazmine was seen today for follow-up, med refill, wheezing, shortness of breath and insomnia.    Diagnoses and all orders for this visit:    Paroxysmal atrial fibrillation    Acquired hypothyroidism    Cough  -     XR Chest PA & Lateral    Breast cancer screening  -     Mammo diagnostic bilateral; Future    Encounter for screening mammogram for high-risk patient  -     Mammo diagnostic bilateral; Future    Personal history of other medical treatment   -     Mammo diagnostic bilateral; Future    Chronic fatigue                   I would like him to return for another visit in 3 month(s)

## 2017-03-02 ENCOUNTER — TELEPHONE (OUTPATIENT)
Dept: FAMILY MEDICINE CLINIC | Facility: CLINIC | Age: 82
End: 2017-03-02

## 2017-03-02 RX ORDER — FUROSEMIDE 40 MG/1
40 TABLET ORAL DAILY
Qty: 30 TABLET | Refills: 2 | Status: SHIPPED | OUTPATIENT
Start: 2017-03-02 | End: 2017-10-18

## 2017-03-02 NOTE — TELEPHONE ENCOUNTER
Home health called and stated that vernon needs a rx for Lasix 40 mg sent in.  At her visit she told me she was not taking it but turns out she is supposed be taking it.  Also her son lives in [place] called asking if vernon is well enough to come visit him  because vernon told him she wants to come noe Brown # [phone]

## 2017-03-03 NOTE — TELEPHONE ENCOUNTER
lmovm informing the nurse about the lasix that was sent in  And spoke to her daughter in law about vernon being able to go to [place]

## 2017-03-06 ENCOUNTER — TELEPHONE (OUTPATIENT)
Dept: FAMILY MEDICINE CLINIC | Facility: CLINIC | Age: 82
End: 2017-03-06

## 2017-03-06 RX ORDER — ALBUTEROL SULFATE 1.25 MG/3ML
1 SOLUTION RESPIRATORY (INHALATION) EVERY 6 HOURS PRN
Qty: 25 VIAL | Refills: 12 | Status: SHIPPED | OUTPATIENT
Start: 2017-03-06 | End: 2017-03-07 | Stop reason: SDUPTHER

## 2017-03-07 DIAGNOSIS — Z12.31 ENCOUNTER FOR SCREENING MAMMOGRAM FOR HIGH-RISK PATIENT: Primary | ICD-10-CM

## 2017-03-07 RX ORDER — ALBUTEROL SULFATE 1.25 MG/3ML
1 SOLUTION RESPIRATORY (INHALATION) EVERY 6 HOURS PRN
Qty: 25 VIAL | Refills: 12 | Status: SHIPPED | OUTPATIENT
Start: 2017-03-07 | End: 2017-10-18

## 2017-03-08 ENCOUNTER — TELEPHONE (OUTPATIENT)
Dept: FAMILY MEDICINE CLINIC | Facility: CLINIC | Age: 82
End: 2017-03-08

## 2017-03-08 RX ORDER — BUDESONIDE 1 MG/2ML
1 INHALANT ORAL
Qty: 180 ML | Refills: 5
Start: 2017-03-08 | End: 2017-10-18

## 2017-03-09 ENCOUNTER — TELEPHONE (OUTPATIENT)
Dept: FAMILY MEDICINE CLINIC | Facility: CLINIC | Age: 82
End: 2017-03-09

## 2017-03-14 ENCOUNTER — HOSPITAL ENCOUNTER (OUTPATIENT)
Dept: MAMMOGRAPHY | Facility: HOSPITAL | Age: 82
Discharge: HOME | End: 2017-03-14
Admitting: FAMILY MEDICINE

## 2017-03-14 DIAGNOSIS — Z12.31 ENCOUNTER FOR SCREENING MAMMOGRAM FOR HIGH-RISK PATIENT: ICD-10-CM

## 2017-03-14 PROCEDURE — G0202 SCR MAMMO BI INCL CAD: HCPCS

## 2017-03-28 ENCOUNTER — APPOINTMENT (OUTPATIENT)
Dept: MAMMOGRAPHY | Facility: HOSPITAL | Age: 82
End: 2017-03-28

## 2017-04-10 ENCOUNTER — CLINICAL SUPPORT (OUTPATIENT)
Dept: FAMILY MEDICINE CLINIC | Facility: CLINIC | Age: 82
End: 2017-04-10

## 2017-04-10 DIAGNOSIS — I48.0 PAROXYSMAL ATRIAL FIBRILLATION (HCC): Primary | ICD-10-CM

## 2017-04-10 LAB — INR PPP: 1.5 (ref 0.9–1.1)

## 2017-04-10 PROCEDURE — 85610 PROTHROMBIN TIME: CPT | Performed by: FAMILY MEDICINE

## 2017-04-10 PROCEDURE — 36416 COLLJ CAPILLARY BLOOD SPEC: CPT | Performed by: FAMILY MEDICINE

## 2017-04-27 ENCOUNTER — CLINICAL SUPPORT (OUTPATIENT)
Dept: FAMILY MEDICINE CLINIC | Facility: CLINIC | Age: 82
End: 2017-04-27

## 2017-04-27 DIAGNOSIS — I48.91 ATRIAL FIBRILLATION, UNSPECIFIED TYPE (HCC): Primary | ICD-10-CM

## 2017-04-27 DIAGNOSIS — I48.0 PAROXYSMAL ATRIAL FIBRILLATION (HCC): ICD-10-CM

## 2017-04-27 LAB — INR PPP: 2.2 (ref 0.9–1.1)

## 2017-04-27 PROCEDURE — 85610 PROTHROMBIN TIME: CPT | Performed by: FAMILY MEDICINE

## 2017-04-27 PROCEDURE — 36416 COLLJ CAPILLARY BLOOD SPEC: CPT | Performed by: FAMILY MEDICINE

## 2017-05-12 ENCOUNTER — OFFICE VISIT (OUTPATIENT)
Dept: FAMILY MEDICINE CLINIC | Facility: CLINIC | Age: 82
End: 2017-05-12

## 2017-05-12 VITALS
BODY MASS INDEX: 23.6 KG/M2 | DIASTOLIC BLOOD PRESSURE: 70 MMHG | HEIGHT: 61 IN | RESPIRATION RATE: 16 BRPM | OXYGEN SATURATION: 94 % | WEIGHT: 125 LBS | TEMPERATURE: 98.5 F | SYSTOLIC BLOOD PRESSURE: 120 MMHG | HEART RATE: 77 BPM

## 2017-05-12 DIAGNOSIS — E03.9 ACQUIRED HYPOTHYROIDISM: ICD-10-CM

## 2017-05-12 DIAGNOSIS — I48.0 PAROXYSMAL ATRIAL FIBRILLATION (HCC): ICD-10-CM

## 2017-05-12 DIAGNOSIS — H65.02 ACUTE SEROUS OTITIS MEDIA OF LEFT EAR, RECURRENCE NOT SPECIFIED: ICD-10-CM

## 2017-05-12 DIAGNOSIS — R53.82 CHRONIC FATIGUE: ICD-10-CM

## 2017-05-12 DIAGNOSIS — M54.2 NECK PAIN: Primary | ICD-10-CM

## 2017-05-12 PROCEDURE — 99214 OFFICE O/P EST MOD 30 MIN: CPT | Performed by: FAMILY MEDICINE

## 2017-05-12 PROCEDURE — 72050 X-RAY EXAM NECK SPINE 4/5VWS: CPT | Performed by: FAMILY MEDICINE

## 2017-05-12 RX ORDER — AZELASTINE 1 MG/ML
2 SPRAY, METERED NASAL 2 TIMES DAILY
Qty: 30 ML | Refills: 2 | Status: SHIPPED | OUTPATIENT
Start: 2017-05-12 | End: 2017-10-18

## 2017-05-12 RX ORDER — AMOXICILLIN 500 MG/1
500 TABLET, FILM COATED ORAL
Qty: 21 TABLET | Refills: 0 | Status: SHIPPED | OUTPATIENT
Start: 2017-05-12 | End: 2017-06-23

## 2017-05-13 LAB
ALBUMIN SERPL-MCNC: 4.1 G/DL (ref 3.5–5.2)
ALBUMIN/GLOB SERPL: 1.4 G/DL
ALP SERPL-CCNC: 89 U/L (ref 39–117)
ALT SERPL-CCNC: 10 U/L (ref 1–33)
AST SERPL-CCNC: 25 U/L (ref 1–32)
BASOPHILS # BLD AUTO: 0.01 10*3/MM3 (ref 0–0.2)
BASOPHILS NFR BLD AUTO: 0.1 % (ref 0–1.5)
BILIRUB SERPL-MCNC: 0.5 MG/DL (ref 0.1–1.2)
BUN SERPL-MCNC: 13 MG/DL (ref 8–23)
BUN/CREAT SERPL: 14.6 (ref 7–25)
CALCIUM SERPL-MCNC: 9.1 MG/DL (ref 8.2–9.6)
CHLORIDE SERPL-SCNC: 102 MMOL/L (ref 98–107)
CO2 SERPL-SCNC: 26 MMOL/L (ref 22–29)
CREAT SERPL-MCNC: 0.89 MG/DL (ref 0.57–1)
EOSINOPHIL # BLD AUTO: 0 10*3/MM3 (ref 0–0.7)
EOSINOPHIL NFR BLD AUTO: 0 % (ref 0.3–6.2)
ERYTHROCYTE [DISTWIDTH] IN BLOOD BY AUTOMATED COUNT: 14.3 % (ref 11.7–13)
GLOBULIN SER CALC-MCNC: 2.9 GM/DL
GLUCOSE SERPL-MCNC: 85 MG/DL (ref 65–99)
HCT VFR BLD AUTO: 40.2 % (ref 35.6–45.5)
HGB BLD-MCNC: 12.3 G/DL (ref 11.9–15.5)
IMM GRANULOCYTES # BLD: 0.03 10*3/MM3 (ref 0–0.03)
IMM GRANULOCYTES NFR BLD: 0.3 % (ref 0–0.5)
LYMPHOCYTES # BLD AUTO: 0.93 10*3/MM3 (ref 0.9–4.8)
LYMPHOCYTES NFR BLD AUTO: 9.5 % (ref 19.6–45.3)
MCH RBC QN AUTO: 34.6 PG (ref 26.9–32)
MCHC RBC AUTO-ENTMCNC: 30.6 G/DL (ref 32.4–36.3)
MCV RBC AUTO: 112.9 FL (ref 80.5–98.2)
MONOCYTES # BLD AUTO: 0.78 10*3/MM3 (ref 0.2–1.2)
MONOCYTES NFR BLD AUTO: 7.9 % (ref 5–12)
NEUTROPHILS # BLD AUTO: 8.09 10*3/MM3 (ref 1.9–8.1)
NEUTROPHILS NFR BLD AUTO: 82.2 % (ref 42.7–76)
PLATELET # BLD AUTO: 273 10*3/MM3 (ref 140–500)
POTASSIUM SERPL-SCNC: 4.8 MMOL/L (ref 3.5–5.2)
PROT SERPL-MCNC: 7 G/DL (ref 6–8.5)
RBC # BLD AUTO: 3.56 10*6/MM3 (ref 3.9–5.2)
SODIUM SERPL-SCNC: 142 MMOL/L (ref 136–145)
T4 FREE SERPL-MCNC: 1.57 NG/DL (ref 0.93–1.7)
TSH SERPL DL<=0.005 MIU/L-ACNC: 0.01 MIU/ML (ref 0.27–4.2)
WBC # BLD AUTO: 9.84 10*3/MM3 (ref 4.5–10.7)

## 2017-05-25 ENCOUNTER — CLINICAL SUPPORT (OUTPATIENT)
Dept: FAMILY MEDICINE CLINIC | Facility: CLINIC | Age: 82
End: 2017-05-25

## 2017-05-25 DIAGNOSIS — I48.0 PAROXYSMAL ATRIAL FIBRILLATION (HCC): Primary | ICD-10-CM

## 2017-05-25 LAB — INR PPP: 2.6 (ref 0.9–1.1)

## 2017-05-25 PROCEDURE — 85610 PROTHROMBIN TIME: CPT | Performed by: FAMILY MEDICINE

## 2017-05-25 PROCEDURE — 36416 COLLJ CAPILLARY BLOOD SPEC: CPT | Performed by: FAMILY MEDICINE

## 2017-05-26 RX ORDER — HYDROCODONE BITARTRATE AND ACETAMINOPHEN 5; 325 MG/1; MG/1
1 TABLET ORAL EVERY 8 HOURS PRN
Qty: 20 TABLET | Refills: 0 | Status: SHIPPED | OUTPATIENT
Start: 2017-05-26 | End: 2017-06-23 | Stop reason: SDUPTHER

## 2017-06-22 ENCOUNTER — CLINICAL SUPPORT (OUTPATIENT)
Dept: FAMILY MEDICINE CLINIC | Facility: CLINIC | Age: 82
End: 2017-06-22

## 2017-06-22 DIAGNOSIS — I48.0 PAROXYSMAL ATRIAL FIBRILLATION (HCC): Primary | ICD-10-CM

## 2017-06-22 LAB — INR PPP: 2.5 (ref 0.9–1.1)

## 2017-06-22 PROCEDURE — 85610 PROTHROMBIN TIME: CPT | Performed by: FAMILY MEDICINE

## 2017-06-22 PROCEDURE — 36416 COLLJ CAPILLARY BLOOD SPEC: CPT | Performed by: FAMILY MEDICINE

## 2017-06-22 NOTE — PROGRESS NOTES
Pt came in for a INR check   Right hand pointer finger was used  Pt tolerated the stick well   KA

## 2017-06-23 ENCOUNTER — OFFICE VISIT (OUTPATIENT)
Dept: FAMILY MEDICINE CLINIC | Facility: CLINIC | Age: 82
End: 2017-06-23

## 2017-06-23 VITALS
OXYGEN SATURATION: 95 % | SYSTOLIC BLOOD PRESSURE: 130 MMHG | BODY MASS INDEX: 23.98 KG/M2 | TEMPERATURE: 98.5 F | HEIGHT: 61 IN | DIASTOLIC BLOOD PRESSURE: 70 MMHG | WEIGHT: 127 LBS | HEART RATE: 78 BPM

## 2017-06-23 DIAGNOSIS — E03.9 ACQUIRED HYPOTHYROIDISM: ICD-10-CM

## 2017-06-23 DIAGNOSIS — I48.0 PAROXYSMAL ATRIAL FIBRILLATION (HCC): Primary | ICD-10-CM

## 2017-06-23 DIAGNOSIS — H05.233 PERIORBITAL HEMATOMA OF BOTH EYES: ICD-10-CM

## 2017-06-23 PROCEDURE — 70220 X-RAY EXAM OF SINUSES: CPT | Performed by: FAMILY MEDICINE

## 2017-06-23 PROCEDURE — 99214 OFFICE O/P EST MOD 30 MIN: CPT | Performed by: FAMILY MEDICINE

## 2017-06-23 RX ORDER — LEVOTHYROXINE SODIUM 112 UG/1
112 TABLET ORAL DAILY
Qty: 90 TABLET | Refills: 1 | Status: SHIPPED | OUTPATIENT
Start: 2017-06-23 | End: 2017-07-28 | Stop reason: SDUPTHER

## 2017-06-23 RX ORDER — HYDROCODONE BITARTRATE AND ACETAMINOPHEN 5; 325 MG/1; MG/1
1 TABLET ORAL EVERY 8 HOURS PRN
Qty: 20 TABLET | Refills: 0 | Status: SHIPPED | OUTPATIENT
Start: 2017-06-23 | End: 2017-07-26 | Stop reason: SDUPTHER

## 2017-06-23 RX ORDER — WARFARIN SODIUM 3 MG/1
TABLET ORAL
Qty: 40 TABLET | Refills: 5 | Status: SHIPPED | OUTPATIENT
Start: 2017-06-23 | End: 2017-07-26 | Stop reason: SDUPTHER

## 2017-06-23 RX ORDER — PAROXETINE HYDROCHLORIDE 20 MG/1
20 TABLET, FILM COATED ORAL DAILY
Qty: 90 TABLET | Refills: 1 | Status: SHIPPED | OUTPATIENT
Start: 2017-06-23 | End: 2018-02-24 | Stop reason: SDUPTHER

## 2017-06-23 RX ORDER — CARVEDILOL 6.25 MG/1
6.25 TABLET ORAL 2 TIMES DAILY WITH MEALS
Qty: 180 TABLET | Refills: 1 | Status: SHIPPED | OUTPATIENT
Start: 2017-06-23 | End: 2018-01-29 | Stop reason: SDUPTHER

## 2017-06-23 RX ORDER — ERGOCALCIFEROL 1.25 MG/1
50000 CAPSULE ORAL
Qty: 12 CAPSULE | Refills: 3 | Status: SHIPPED | OUTPATIENT
Start: 2017-06-23 | End: 2018-03-12 | Stop reason: SDUPTHER

## 2017-06-23 NOTE — PATIENT INSTRUCTIONS
This is a very nice 89-year-old who has some facial bruising.  Her x-ray today does not show any obvious problem so I would like her to hold her warfarin for one day and then let us recheck her INR in a week.

## 2017-06-23 NOTE — PROGRESS NOTES
Subjective   Jazmine Brown is a 89 y.o. female presenting with   Chief Complaint   Patient presents with   • Bruised face   • Med Refill     warfarin  levothyroxine  paroxetine  carvedilol  vit d  and written rx for hydrocodone        HPI Comments: This is a very pleasant 89-year-old nonsmoker who comes in today for facial bruising.  She denies any history of trauma.  She denies any history of blowing her nose very hard.  She denies headache or confusion or double vision.  She denies epistaxis.  She denies any blood in her urine or blood in her stool.  She is not coughing up blood.  She also denies any dizziness.  She normally wears glasses and denies that she hit her face.  She just had an INR done for her chronic warfarin therapy and it was 2.5 yesterday.       The following portions of the patient's history were reviewed and updated as appropriate: current medications, past family history, past medical history, past social history, past surgical history and problem list.    Review of Systems   HENT: Positive for facial swelling.    All other systems reviewed and are negative.      Objective   Physical Exam   Constitutional: She is oriented to person, place, and time. She appears well-developed and well-nourished. No distress.   HENT:   Head: Normocephalic. Head is with raccoon's eyes. Head is without Rg's sign and without abrasion.       Right Ear: External ear normal.   Left Ear: External ear normal.   Mouth/Throat: Uvula is midline and oropharynx is clear and moist. No oral lesions. Normal dentition (no instability to suggest a LeFort fracture). No lacerations. No oropharyngeal exudate.   Eyes: EOM are normal. Pupils are equal, round, and reactive to light. No scleral icterus.   Neck: Normal range of motion. No thyromegaly present.   Cardiovascular: Normal rate and regular rhythm.    Pulmonary/Chest: Effort normal and breath sounds normal.   Musculoskeletal: She exhibits tenderness (modest neck tenderness  due to arthritis). She exhibits no edema or deformity.   Lymphadenopathy:     She has no cervical adenopathy.   Neurological: She is alert and oriented to person, place, and time. She displays tremor (minor head tremor). No cranial nerve deficit or sensory deficit. Abnormal gait:  she walks slowly but can walk without assistance.   Skin: Skin is warm and dry. She is not diaphoretic.   Psychiatric: She has a normal mood and affect. Her behavior is normal.   Nursing note and vitals reviewed.      Assessment/Plan   Jazmine was seen today for bruised face and med refill.    Diagnoses and all orders for this visit:    Paroxysmal atrial fibrillation    Acquired hypothyroidism    Periorbital hematoma of both eyes    Other orders  -     carvedilol (COREG) 6.25 MG tablet; Take 1 tablet by mouth 2 (Two) Times a Day With Meals.  -     HYDROcodone-acetaminophen (NORCO) 5-325 MG per tablet; Take 1 tablet by mouth Every 8 (Eight) Hours As Needed for Moderate Pain (4-6).  -     levothyroxine (SYNTHROID, LEVOTHROID) 112 MCG tablet; Take 1 tablet by mouth Daily.  -     PARoxetine (PAXIL) 20 MG tablet; Take 1 tablet by mouth Daily.  -     vitamin D (ERGOCALCIFEROL) 00731 UNITS capsule capsule; Take 1 capsule by mouth Every 7 (Seven) Days.  -     warfarin (COUMADIN) 3 MG tablet; Take two tablets on Tuesday, Thursday and Saturday, and one table Monday, Wednesday, Friday and Sunday.                   I would like him to return for another visit in 3 month(s)

## 2017-06-23 NOTE — PROGRESS NOTES
Procedure   Procedures   X Ray report:    To evaluate her bilateral periorbital hematoma I have requested a water's view x-ray here today.  There are no old facial x-rays to compare this to.  This shows a straight septum without any obvious fracture.  Her zygoma appear normal.  Her frontal sinuses are normally aerated.  There is modest mucosal thickening of the left maxillary sinus but there is no indication of blood in the sinuses.

## 2017-06-30 ENCOUNTER — CLINICAL SUPPORT (OUTPATIENT)
Dept: FAMILY MEDICINE CLINIC | Facility: CLINIC | Age: 82
End: 2017-06-30

## 2017-06-30 DIAGNOSIS — I48.0 PAROXYSMAL ATRIAL FIBRILLATION (HCC): Primary | ICD-10-CM

## 2017-06-30 LAB — INR PPP: 1.8 (ref 0.9–1.1)

## 2017-06-30 PROCEDURE — 36416 COLLJ CAPILLARY BLOOD SPEC: CPT | Performed by: FAMILY MEDICINE

## 2017-06-30 PROCEDURE — 85610 PROTHROMBIN TIME: CPT | Performed by: FAMILY MEDICINE

## 2017-07-26 ENCOUNTER — OFFICE VISIT (OUTPATIENT)
Dept: FAMILY MEDICINE CLINIC | Facility: CLINIC | Age: 82
End: 2017-07-26

## 2017-07-26 VITALS
HEART RATE: 83 BPM | TEMPERATURE: 98.3 F | WEIGHT: 126 LBS | SYSTOLIC BLOOD PRESSURE: 140 MMHG | DIASTOLIC BLOOD PRESSURE: 90 MMHG | HEIGHT: 58 IN | BODY MASS INDEX: 26.45 KG/M2 | OXYGEN SATURATION: 97 %

## 2017-07-26 DIAGNOSIS — E03.9 ACQUIRED HYPOTHYROIDISM: ICD-10-CM

## 2017-07-26 DIAGNOSIS — R05.9 COUGH: ICD-10-CM

## 2017-07-26 DIAGNOSIS — R06.02 SHORTNESS OF BREATH: ICD-10-CM

## 2017-07-26 DIAGNOSIS — I48.0 PAROXYSMAL ATRIAL FIBRILLATION (HCC): Primary | ICD-10-CM

## 2017-07-26 LAB — INR PPP: 1.6 (ref 0.9–1.1)

## 2017-07-26 PROCEDURE — 71020 XR CHEST PA AND LATERAL: CPT | Performed by: FAMILY MEDICINE

## 2017-07-26 PROCEDURE — 85610 PROTHROMBIN TIME: CPT | Performed by: FAMILY MEDICINE

## 2017-07-26 PROCEDURE — 36416 COLLJ CAPILLARY BLOOD SPEC: CPT | Performed by: FAMILY MEDICINE

## 2017-07-26 PROCEDURE — 99214 OFFICE O/P EST MOD 30 MIN: CPT | Performed by: FAMILY MEDICINE

## 2017-07-26 RX ORDER — WARFARIN SODIUM 3 MG/1
TABLET ORAL
Qty: 40 TABLET | Refills: 5 | Status: SHIPPED | OUTPATIENT
Start: 2017-07-26 | End: 2018-01-29 | Stop reason: SDUPTHER

## 2017-07-26 RX ORDER — HYDROCODONE BITARTRATE AND ACETAMINOPHEN 5; 325 MG/1; MG/1
1 TABLET ORAL EVERY 8 HOURS PRN
Qty: 20 TABLET | Refills: 0 | Status: SHIPPED | OUTPATIENT
Start: 2017-07-26 | End: 2017-12-19

## 2017-07-26 RX ORDER — IPRATROPIUM BROMIDE AND ALBUTEROL SULFATE 2.5; .5 MG/3ML; MG/3ML
3 SOLUTION RESPIRATORY (INHALATION)
Status: DISCONTINUED | OUTPATIENT
Start: 2017-07-26 | End: 2018-05-16

## 2017-07-26 NOTE — PATIENT INSTRUCTIONS
This is an extremely nice 89-year-old who has developed intermittent shortness of breath with fatigue.  I will request blood work to further evaluate this and call her when the results are available.  Also, I will request a cardiology evaluation.

## 2017-07-26 NOTE — PROGRESS NOTES
Subjective   Jazmine Brown is a 89 y.o. female presenting with   Chief Complaint   Patient presents with   • Shortness of Breath     for no reason  could just be sittting around    • Fatigue     weakness    • Med Refill     written rx for Hydrocodone and warfarin sent to [facility]   • Coagulation Disorder     1.6  takes 3 mg 6 days a wk  and 6 mg on wednesday         HPI Comments: This is a 89-year-old  white female nonsmoker who comes in today because lately she has developed fatigue with generalized weakness and recurrent shortness of breath with occasional wheezing.  She does not have any fever or chest pain.  She has not had any syncopal episodes.  She does have a history of COPD and is supposed to be on Pulmicort.  She also has a nebulizer at home but has not used that recently.    She has not had a productive cough.  She does have a history of pneumonia.  She has had an echocardiogram that showed significant tricuspid regurgitation.    Shortness of Breath   Associated symptoms include wheezing.   Fatigue   Associated symptoms include fatigue and weakness.   Coagulation Disorder   Associated symptoms include fatigue and weakness.        The following portions of the patient's history were reviewed and updated as appropriate: current medications, past family history, past medical history, past social history, past surgical history and problem list.    Review of Systems   Constitutional: Positive for fatigue.   Respiratory: Positive for shortness of breath and wheezing.    Neurological: Positive for weakness.   All other systems reviewed and are negative.      Objective   Physical Exam   Constitutional: She is oriented to person, place, and time. She appears well-developed and well-nourished. No distress.   HENT:   Head: Normocephalic and atraumatic.   Mouth/Throat: Oropharynx is clear and moist. No oropharyngeal exudate.   Eyes: EOM are normal. Pupils are equal, round, and reactive to light.   Neck:  Normal range of motion. Neck supple. No thyromegaly present.   Cardiovascular: Normal rate and regular rhythm.    Murmur (2/6 systolic ejection murmur) heard.  Pulmonary/Chest: Effort normal. No stridor. No respiratory distress. She has no wheezes. She has rhonchi in the right upper field, the right middle field and the left upper field. She has no rales.   Abdominal: Soft. Bowel sounds are normal. She exhibits no distension.   Musculoskeletal: Normal range of motion. She exhibits no edema or tenderness.   Neurological: She is alert and oriented to person, place, and time.   Skin: Skin is warm and dry. No rash noted. She is not diaphoretic.   Psychiatric: She has a normal mood and affect. Her behavior is normal.   Nursing note and vitals reviewed.      Assessment/Plan   Jazmine was seen today for shortness of breath, fatigue, med refill and coagulation disorder.    Diagnoses and all orders for this visit:    Paroxysmal atrial fibrillation  -     POCT INR    Acquired hypothyroidism    Shortness of breath  -     XR Chest PA & Lateral    Other orders  -     warfarin (COUMADIN) 3 MG tablet; Take two tablets on Tuesday, Thursday and Saturday, and one table Monday, Wednesday, Friday and Sunday.  -     HYDROcodone-acetaminophen (NORCO) 5-325 MG per tablet; Take 1 tablet by mouth Every 8 (Eight) Hours As Needed for Moderate Pain (4-6).                   I would like him to return for another visit in 3 month(s)

## 2017-07-26 NOTE — PROGRESS NOTES
Procedure   Procedures   X Ray report:    To further evaluate her complaint of cough and shortness of breath I have requested a chest x-ray.  I compared this to the written report of old x-rays.  She has marked cardiomegaly but no pneumonia or pneumothorax.

## 2017-07-26 NOTE — PROGRESS NOTES
Please call the patient regarding her abnormal result.   Pt was informed of new instructions before she left the office

## 2017-07-27 LAB
ALBUMIN SERPL-MCNC: 4.3 G/DL (ref 3.5–5.2)
ALBUMIN/GLOB SERPL: 1.6 G/DL
ALP SERPL-CCNC: 92 U/L (ref 39–117)
ALT SERPL-CCNC: 18 U/L (ref 1–33)
AST SERPL-CCNC: 32 U/L (ref 1–32)
BASOPHILS # BLD AUTO: 0.01 10*3/MM3 (ref 0–0.2)
BASOPHILS NFR BLD AUTO: 0.2 % (ref 0–1.5)
BILIRUB SERPL-MCNC: 0.6 MG/DL (ref 0.1–1.2)
BUN SERPL-MCNC: 12 MG/DL (ref 8–23)
BUN/CREAT SERPL: 15 (ref 7–25)
CALCIUM SERPL-MCNC: 9.7 MG/DL (ref 8.2–9.6)
CHLORIDE SERPL-SCNC: 102 MMOL/L (ref 98–107)
CO2 SERPL-SCNC: 27.5 MMOL/L (ref 22–29)
CREAT SERPL-MCNC: 0.8 MG/DL (ref 0.57–1)
EOSINOPHIL # BLD AUTO: 0 10*3/MM3 (ref 0–0.7)
EOSINOPHIL NFR BLD AUTO: 0 % (ref 0.3–6.2)
ERYTHROCYTE [DISTWIDTH] IN BLOOD BY AUTOMATED COUNT: 14.8 % (ref 11.7–13)
GLOBULIN SER CALC-MCNC: 2.7 GM/DL
GLUCOSE SERPL-MCNC: 86 MG/DL (ref 65–99)
HCT VFR BLD AUTO: 42.1 % (ref 35.6–45.5)
HGB BLD-MCNC: 12.5 G/DL (ref 11.9–15.5)
IMM GRANULOCYTES # BLD: 0 10*3/MM3 (ref 0–0.03)
IMM GRANULOCYTES NFR BLD: 0 % (ref 0–0.5)
LYMPHOCYTES # BLD AUTO: 0.96 10*3/MM3 (ref 0.9–4.8)
LYMPHOCYTES NFR BLD AUTO: 15.8 % (ref 19.6–45.3)
MCH RBC QN AUTO: 34.4 PG (ref 26.9–32)
MCHC RBC AUTO-ENTMCNC: 29.7 G/DL (ref 32.4–36.3)
MCV RBC AUTO: 116 FL (ref 80.5–98.2)
MONOCYTES # BLD AUTO: 0.5 10*3/MM3 (ref 0.2–1.2)
MONOCYTES NFR BLD AUTO: 8.2 % (ref 5–12)
NEUTROPHILS # BLD AUTO: 4.61 10*3/MM3 (ref 1.9–8.1)
NEUTROPHILS NFR BLD AUTO: 75.8 % (ref 42.7–76)
NT-PROBNP SERPL-MCNC: 2704 PG/ML (ref 0–738)
PLATELET # BLD AUTO: 270 10*3/MM3 (ref 140–500)
POTASSIUM SERPL-SCNC: 5.2 MMOL/L (ref 3.5–5.2)
PROT SERPL-MCNC: 7 G/DL (ref 6–8.5)
RBC # BLD AUTO: 3.63 10*6/MM3 (ref 3.9–5.2)
SODIUM SERPL-SCNC: 144 MMOL/L (ref 136–145)
TSH SERPL DL<=0.005 MIU/L-ACNC: 0.02 MIU/ML (ref 0.27–4.2)
WBC # BLD AUTO: 6.08 10*3/MM3 (ref 4.5–10.7)

## 2017-07-28 RX ORDER — LEVOTHYROXINE SODIUM 0.1 MG/1
100 TABLET ORAL DAILY
Qty: 90 TABLET | Refills: 1 | Status: SHIPPED | OUTPATIENT
Start: 2017-07-28 | End: 2017-10-18 | Stop reason: DRUGHIGH

## 2017-07-28 NOTE — PROGRESS NOTES
Please call the patient regarding her abnormal result.  I spoke to patient informed her of her results and to  the new rx . I also informed her that Dr Duckworth is sending her to see a cardiologist  And she informed me the office has already called with a appointment

## 2017-08-07 ENCOUNTER — TELEPHONE (OUTPATIENT)
Dept: CARDIOLOGY | Facility: CLINIC | Age: 82
End: 2017-08-07

## 2017-08-07 NOTE — TELEPHONE ENCOUNTER
Pt called. She is very soa. She states that she is unable to walk from her bedroom to the bathroom without needing to catch her breath.     Pt's son was also on the line, per him, pt declined to go out to lunch with him. He states this is very out of character for her and that she must feel very poorly to decline.     Pt can be reached at [phone]

## 2017-08-07 NOTE — TELEPHONE ENCOUNTER
I do not see that I have ever seen this patient.  I see that she has an appt with me at the end of this month but I cannot give any advice if I have never seen her.  It seems that all of her care has been with [facility].    CRISTA

## 2017-08-09 ENCOUNTER — CLINICAL SUPPORT (OUTPATIENT)
Dept: FAMILY MEDICINE CLINIC | Facility: CLINIC | Age: 82
End: 2017-08-09

## 2017-08-09 DIAGNOSIS — I48.0 PAROXYSMAL ATRIAL FIBRILLATION (HCC): Primary | ICD-10-CM

## 2017-08-09 LAB — INR PPP: ABNORMAL (ref 0.9–1.1)

## 2017-08-09 PROCEDURE — 85610 PROTHROMBIN TIME: CPT | Performed by: FAMILY MEDICINE

## 2017-08-09 PROCEDURE — 36416 COLLJ CAPILLARY BLOOD SPEC: CPT | Performed by: FAMILY MEDICINE

## 2017-08-28 ENCOUNTER — CLINICAL SUPPORT (OUTPATIENT)
Dept: FAMILY MEDICINE CLINIC | Facility: CLINIC | Age: 82
End: 2017-08-28

## 2017-08-28 DIAGNOSIS — I48.0 PAROXYSMAL A-FIB (HCC): Primary | ICD-10-CM

## 2017-08-28 LAB — INR PPP: 2.3 (ref 0.9–1.1)

## 2017-08-28 PROCEDURE — 36416 COLLJ CAPILLARY BLOOD SPEC: CPT | Performed by: FAMILY MEDICINE

## 2017-08-28 PROCEDURE — 85610 PROTHROMBIN TIME: CPT | Performed by: FAMILY MEDICINE

## 2017-09-14 ENCOUNTER — TELEPHONE (OUTPATIENT)
Dept: FAMILY MEDICINE CLINIC | Facility: CLINIC | Age: 82
End: 2017-09-14

## 2017-09-14 NOTE — TELEPHONE ENCOUNTER
Pt called this morning complaining of fatigue and shortness of breath. I offered the patient an appointment today with Dr. Patterson since Dr. Duckworth is out of the office but patient refused. She stated she did not have a way to get here. I spoke with Dr. Gucci Chávez's nurse, and it was decided patient should go to the ER to get checked out. I informed patient to go to ER and even call 911 if she needed an ambulance. Pt said she would think about it and ended the phone call.

## 2017-10-18 ENCOUNTER — OFFICE VISIT (OUTPATIENT)
Dept: FAMILY MEDICINE CLINIC | Facility: CLINIC | Age: 82
End: 2017-10-18

## 2017-10-18 VITALS
DIASTOLIC BLOOD PRESSURE: 80 MMHG | TEMPERATURE: 98.5 F | BODY MASS INDEX: 22.45 KG/M2 | SYSTOLIC BLOOD PRESSURE: 150 MMHG | WEIGHT: 122 LBS | OXYGEN SATURATION: 93 % | HEIGHT: 62 IN | HEART RATE: 78 BPM

## 2017-10-18 DIAGNOSIS — I50.42 CHRONIC COMBINED SYSTOLIC AND DIASTOLIC CONGESTIVE HEART FAILURE (HCC): ICD-10-CM

## 2017-10-18 DIAGNOSIS — E03.9 ACQUIRED HYPOTHYROIDISM: ICD-10-CM

## 2017-10-18 DIAGNOSIS — I48.0 PAROXYSMAL ATRIAL FIBRILLATION (HCC): Primary | ICD-10-CM

## 2017-10-18 DIAGNOSIS — Z23 NEED FOR INFLUENZA VACCINATION: ICD-10-CM

## 2017-10-18 PROCEDURE — 99214 OFFICE O/P EST MOD 30 MIN: CPT | Performed by: FAMILY MEDICINE

## 2017-10-18 PROCEDURE — G0008 ADMIN INFLUENZA VIRUS VAC: HCPCS | Performed by: FAMILY MEDICINE

## 2017-10-18 PROCEDURE — 90662 IIV NO PRSV INCREASED AG IM: CPT | Performed by: FAMILY MEDICINE

## 2017-10-18 PROCEDURE — 36416 COLLJ CAPILLARY BLOOD SPEC: CPT | Performed by: FAMILY MEDICINE

## 2017-10-18 RX ORDER — LEVOTHYROXINE SODIUM 112 UG/1
TABLET ORAL
COMMUNITY
Start: 2017-10-05 | End: 2017-10-18 | Stop reason: SDUPTHER

## 2017-10-18 RX ORDER — LISINOPRIL 5 MG/1
TABLET ORAL
COMMUNITY
Start: 2017-09-16 | End: 2018-03-22 | Stop reason: SDUPTHER

## 2017-10-18 RX ORDER — ALBUTEROL SULFATE 90 UG/1
2 AEROSOL, METERED RESPIRATORY (INHALATION) EVERY 4 HOURS PRN
Qty: 18 G | Refills: 5 | Status: SHIPPED | OUTPATIENT
Start: 2017-10-18 | End: 2018-08-15 | Stop reason: SDUPTHER

## 2017-10-18 RX ORDER — BUMETANIDE 0.5 MG/1
TABLET ORAL
COMMUNITY
Start: 2017-09-16 | End: 2017-12-15 | Stop reason: SDUPTHER

## 2017-10-18 RX ORDER — LEVOTHYROXINE SODIUM 112 UG/1
112 TABLET ORAL DAILY
Qty: 30 TABLET | Refills: 5 | Status: SHIPPED | OUTPATIENT
Start: 2017-10-18 | End: 2017-10-19 | Stop reason: SDUPTHER

## 2017-10-18 NOTE — PATIENT INSTRUCTIONS
This is a very nice 89-year-old who is here for follow-up after having been admitted to the hospital for atrial fibrillation and respiratory problems.  She appears to be doing much better now but I will order blood work to make sure her kidney function and potassium remain adequate.  I would like her to call if there is any problem.

## 2017-10-18 NOTE — PROGRESS NOTES
Subjective   Jazmine Brown is a 89 y.o. female presenting with   Chief Complaint   Patient presents with   • Follow-up     heart failure    • Coagulation Disorder     2.2    6 mg tue thur sun  3 mg mon wed fri sat    • Injections     flu shot    • Med Refill     albertal inhaler  and levothyroxine         HPI Comments: 89-year-old  white female nonsmoker was admitted to the hospital for CHF and paroxysmal atrial fibrillation.  She is here for follow-up and says she feels a lot better.  Her INR today is 2.2 so she is exactly where she should be.  However I want to recheck her in 2 weeks to make sure there is not a substantial change    I reviewed her medication list and will renew the 2 that she says she needs filled out.    She tells me she is breathing well but does want a refill on her albuterol.  She does not have any chest pain or palpitations.    Coagulation Disorder          The following portions of the patient's history were reviewed and updated as appropriate: current medications, past family history, past medical history, past social history, past surgical history and problem list.    Review of Systems   All other systems reviewed and are negative.      Objective   Physical Exam   Constitutional: She is oriented to person, place, and time. She appears well-developed and well-nourished. No distress.   HENT:   Head: Normocephalic and atraumatic.   Mouth/Throat: Oropharynx is clear and moist. No oropharyngeal exudate.   Eyes: Pupils are equal, round, and reactive to light.   Neck: Normal range of motion. Neck supple. No JVD present. No thyromegaly present.   Cardiovascular: Normal rate and regular rhythm.    Murmur (1/6 systolic ejection murmur) heard.  Pulmonary/Chest: Effort normal and breath sounds normal. No respiratory distress. She has no wheezes.   Musculoskeletal: Normal range of motion. She exhibits no edema or tenderness.   Lymphadenopathy:     She has no cervical adenopathy.    Neurological: She is alert and oriented to person, place, and time. No cranial nerve deficit.   Skin: Skin is warm and dry. Rash: cchymosis on forearms and ankles from the warfarin. She is not diaphoretic.   Psychiatric: She has a normal mood and affect. Her behavior is normal.   Nursing note and vitals reviewed.      Assessment/Plan   Jazmine was seen today for follow-up, coagulation disorder, injections and med refill.    Diagnoses and all orders for this visit:    Paroxysmal atrial fibrillation    Acquired hypothyroidism  -     Comprehensive Metabolic Panel  -     TSH    Chronic combined systolic and diastolic congestive heart failure  -     Comprehensive Metabolic Panel  -     TSH    Other orders  -     albuterol (PROAIR HFA) 108 (90 Base) MCG/ACT inhaler; Inhale 2 puffs Every 4 (Four) Hours As Needed for Wheezing or Shortness of Air.                   I would like him to return for another visit in 3 month(s)

## 2017-10-19 LAB
ALBUMIN SERPL-MCNC: 4.2 G/DL (ref 3.5–5.2)
ALBUMIN/GLOB SERPL: 1.6 G/DL
ALP SERPL-CCNC: 81 U/L (ref 39–117)
ALT SERPL-CCNC: 11 U/L (ref 1–33)
AST SERPL-CCNC: 24 U/L (ref 1–32)
BILIRUB SERPL-MCNC: 0.4 MG/DL (ref 0.1–1.2)
BUN SERPL-MCNC: 19 MG/DL (ref 8–23)
BUN/CREAT SERPL: 22.6 (ref 7–25)
CALCIUM SERPL-MCNC: 9.4 MG/DL (ref 8.2–9.6)
CHLORIDE SERPL-SCNC: 104 MMOL/L (ref 98–107)
CO2 SERPL-SCNC: 30.2 MMOL/L (ref 22–29)
CREAT SERPL-MCNC: 0.84 MG/DL (ref 0.57–1)
GLOBULIN SER CALC-MCNC: 2.6 GM/DL
GLUCOSE SERPL-MCNC: 88 MG/DL (ref 65–99)
POTASSIUM SERPL-SCNC: 5.3 MMOL/L (ref 3.5–5.2)
PROT SERPL-MCNC: 6.8 G/DL (ref 6–8.5)
SODIUM SERPL-SCNC: 146 MMOL/L (ref 136–145)
TSH SERPL DL<=0.005 MIU/L-ACNC: 0.02 MIU/ML (ref 0.27–4.2)

## 2017-10-19 RX ORDER — LEVOTHYROXINE SODIUM 0.1 MG/1
100 TABLET ORAL DAILY
Qty: 30 TABLET | Refills: 5 | Status: SHIPPED | OUTPATIENT
Start: 2017-10-19 | End: 2017-12-18 | Stop reason: SDUPTHER

## 2017-10-19 NOTE — PROGRESS NOTES
Please call the patient regarding her abnormal result.  Spoke to pt informed her of her results and message per dr bradford

## 2017-11-10 ENCOUNTER — OFFICE VISIT (OUTPATIENT)
Dept: FAMILY MEDICINE CLINIC | Facility: CLINIC | Age: 82
End: 2017-11-10

## 2017-11-10 VITALS
SYSTOLIC BLOOD PRESSURE: 142 MMHG | OXYGEN SATURATION: 95 % | TEMPERATURE: 98.5 F | WEIGHT: 121.3 LBS | DIASTOLIC BLOOD PRESSURE: 74 MMHG | BODY MASS INDEX: 22.32 KG/M2 | HEART RATE: 99 BPM | HEIGHT: 62 IN

## 2017-11-10 DIAGNOSIS — R04.0 BLEEDING NOSE: Primary | ICD-10-CM

## 2017-11-10 DIAGNOSIS — I48.0 PAROXYSMAL ATRIAL FIBRILLATION (HCC): ICD-10-CM

## 2017-11-10 LAB — INR PPP: 2.1 (ref 0.9–1.1)

## 2017-11-10 PROCEDURE — 99213 OFFICE O/P EST LOW 20 MIN: CPT | Performed by: FAMILY MEDICINE

## 2017-11-10 PROCEDURE — 36416 COLLJ CAPILLARY BLOOD SPEC: CPT | Performed by: FAMILY MEDICINE

## 2017-11-10 PROCEDURE — 85610 PROTHROMBIN TIME: CPT | Performed by: FAMILY MEDICINE

## 2017-11-10 NOTE — PROGRESS NOTES
"Subjective   Jazmine Brown is a 89 y.o. female.     Chief Complaint   Patient presents with   • Nose Bleed     the last 2 days        History of Present Illness    48 hours of intermittent nosebleeds.  Mostly anterior.  3 days ago she had a mild headache.  Took an aspirin which she occasionally takes.  That morning start with the nosebleeds.  Also her family has been telling her to change the furnace filter.  She's had no headaches otherwise.  No bleeding elsewhere.  No major sinus symptoms.  No fever.      The following portions of the patient's history were reviewed and updated as appropriate: allergies, current medications, past family history, past medical history, past social history, past surgical history and problem list.          Review of Systems   Constitutional: Negative for fever.   HENT: Negative for congestion.    Respiratory: Negative.    Cardiovascular: Negative.        Objective   Blood pressure 142/74, pulse 99, temperature 98.5 °F (36.9 °C), temperature source Oral, height 62\" (157.5 cm), weight 121 lb 4.8 oz (55 kg), SpO2 95 %, not currently breastfeeding.  Physical Exam   Constitutional: No distress.   HENT:   Head: Atraumatic.   Mouth/Throat: Oropharynx is clear and moist. No oropharyngeal exudate.   Bilateral anterior nares reveals small capillary eschars.  No bleeding.  Looks like it's healing well.  She's been placing Vaseline on it.   Skin: She is not diaphoretic.       Assessment/Plan   Jazmine was seen today for nose bleed.    Diagnoses and all orders for this visit:    Bleeding nose    Paroxysmal atrial fibrillation  -     POCT INR      Anterior epistaxis.  Resolved now for about 18 hours.  Likely related to accommodation dry air, warfarin use, and recent aspirin use ×1.  INR today 2.1.  Continue same.  Continue Vaseline in the nose.  Patient understands with the emergency room this weekend with recurrent severe symptoms.         "

## 2017-11-10 NOTE — PATIENT INSTRUCTIONS
Nosebleeds are likely caused by a combination of the warfarin, dry air, and the aspirin.  Do not take aspirin.  The nosebleeds are now stopped.  If they get bad this weekend, go to the emergency room thank you

## 2017-12-15 ENCOUNTER — OFFICE VISIT (OUTPATIENT)
Dept: FAMILY MEDICINE CLINIC | Facility: CLINIC | Age: 82
End: 2017-12-15

## 2017-12-15 VITALS
DIASTOLIC BLOOD PRESSURE: 78 MMHG | WEIGHT: 124 LBS | HEART RATE: 74 BPM | BODY MASS INDEX: 22.82 KG/M2 | SYSTOLIC BLOOD PRESSURE: 158 MMHG | OXYGEN SATURATION: 95 % | HEIGHT: 62 IN | TEMPERATURE: 98.5 F

## 2017-12-15 DIAGNOSIS — I50.42 CHRONIC COMBINED SYSTOLIC AND DIASTOLIC CONGESTIVE HEART FAILURE (HCC): ICD-10-CM

## 2017-12-15 DIAGNOSIS — N30.00 ACUTE CYSTITIS WITHOUT HEMATURIA: Primary | ICD-10-CM

## 2017-12-15 DIAGNOSIS — E03.9 ACQUIRED HYPOTHYROIDISM: ICD-10-CM

## 2017-12-15 DIAGNOSIS — I48.0 PAROXYSMAL ATRIAL FIBRILLATION (HCC): Primary | ICD-10-CM

## 2017-12-15 DIAGNOSIS — R53.82 CHRONIC FATIGUE: ICD-10-CM

## 2017-12-15 DIAGNOSIS — N30.00 ACUTE CYSTITIS WITHOUT HEMATURIA: ICD-10-CM

## 2017-12-15 PROBLEM — J96.01 ACUTE RESPIRATORY FAILURE WITH HYPOXIA (HCC): Status: RESOLVED | Noted: 2017-01-26 | Resolved: 2017-12-15

## 2017-12-15 PROBLEM — J18.9 PNEUMONIA DUE TO INFECTIOUS ORGANISM: Status: RESOLVED | Noted: 2017-01-26 | Resolved: 2017-12-15

## 2017-12-15 PROBLEM — M54.2 NECK PAIN: Status: RESOLVED | Noted: 2017-05-12 | Resolved: 2017-12-15

## 2017-12-15 PROBLEM — H65.02 ACUTE SEROUS OTITIS MEDIA OF LEFT EAR: Status: RESOLVED | Noted: 2017-05-12 | Resolved: 2017-12-15

## 2017-12-15 PROBLEM — H05.233 PERIORBITAL HEMATOMA OF BOTH EYES: Status: RESOLVED | Noted: 2017-06-23 | Resolved: 2017-12-15

## 2017-12-15 LAB
BILIRUB BLD-MCNC: NEGATIVE MG/DL
CLARITY, POC: CLEAR
COLOR UR: YELLOW
GLUCOSE UR STRIP-MCNC: NEGATIVE MG/DL
INR PPP: 2.9 (ref 0.9–1.1)
KETONES UR QL: NEGATIVE
LEUKOCYTE EST, POC: ABNORMAL
NITRITE UR-MCNC: NEGATIVE MG/ML
PH UR: 5.5 [PH] (ref 5–8)
PROT UR STRIP-MCNC: NEGATIVE MG/DL
RBC # UR STRIP: NEGATIVE /UL
SP GR UR: 1 (ref 1–1.03)
UROBILINOGEN UR QL: NORMAL

## 2017-12-15 PROCEDURE — 99213 OFFICE O/P EST LOW 20 MIN: CPT | Performed by: FAMILY MEDICINE

## 2017-12-15 PROCEDURE — 36416 COLLJ CAPILLARY BLOOD SPEC: CPT | Performed by: FAMILY MEDICINE

## 2017-12-15 PROCEDURE — 81003 URINALYSIS AUTO W/O SCOPE: CPT | Performed by: FAMILY MEDICINE

## 2017-12-15 PROCEDURE — 85610 PROTHROMBIN TIME: CPT | Performed by: FAMILY MEDICINE

## 2017-12-15 RX ORDER — DOXYCYCLINE 50 MG/1
50 CAPSULE ORAL 2 TIMES DAILY
Qty: 10 CAPSULE | Refills: 0 | Status: SHIPPED | OUTPATIENT
Start: 2017-12-15 | End: 2018-01-17 | Stop reason: SDUPTHER

## 2017-12-15 RX ORDER — BUMETANIDE 0.5 MG/1
1 TABLET ORAL DAILY
Qty: 30 TABLET | Refills: 2 | Status: SHIPPED | OUTPATIENT
Start: 2017-12-15 | End: 2018-03-22 | Stop reason: SDUPTHER

## 2017-12-15 NOTE — PATIENT INSTRUCTIONS
This is a very nice 89-year-old who appears to be very fatigued.  I will request blood work and notify her when the results are available.    She also has a bladder infection so I will request a urine culture and call out an antibiotic.

## 2017-12-16 LAB
ALBUMIN SERPL-MCNC: 4.1 G/DL (ref 3.5–5.2)
ALBUMIN/GLOB SERPL: 1.5 G/DL
ALP SERPL-CCNC: 88 U/L (ref 39–117)
ALT SERPL-CCNC: 9 U/L (ref 1–33)
AST SERPL-CCNC: 26 U/L (ref 1–32)
BILIRUB SERPL-MCNC: 0.3 MG/DL (ref 0.1–1.2)
BUN SERPL-MCNC: 19 MG/DL (ref 8–23)
BUN/CREAT SERPL: 21.3 (ref 7–25)
CALCIUM SERPL-MCNC: 8.6 MG/DL (ref 8.2–9.6)
CHLORIDE SERPL-SCNC: 104 MMOL/L (ref 98–107)
CO2 SERPL-SCNC: 28 MMOL/L (ref 22–29)
CREAT SERPL-MCNC: 0.89 MG/DL (ref 0.57–1)
GFR SERPLBLD CREATININE-BSD FMLA CKD-EPI: 59 ML/MIN/1.73
GFR SERPLBLD CREATININE-BSD FMLA CKD-EPI: 72 ML/MIN/1.73
GLOBULIN SER CALC-MCNC: 2.8 GM/DL
GLUCOSE SERPL-MCNC: 88 MG/DL (ref 65–99)
POTASSIUM SERPL-SCNC: 4.9 MMOL/L (ref 3.5–5.2)
PROT SERPL-MCNC: 6.9 G/DL (ref 6–8.5)
SODIUM SERPL-SCNC: 144 MMOL/L (ref 136–145)
TSH SERPL DL<=0.005 MIU/L-ACNC: 0.12 MIU/ML (ref 0.27–4.2)

## 2017-12-16 NOTE — PATIENT INSTRUCTIONS
This is a very pleasant 89-year-old who is slowly recovering from a significant respiratory syncytial virus infection.  I would like her to call if she does not continue to improve.  
epigastric

## 2017-12-18 RX ORDER — LEVOTHYROXINE SODIUM 88 UG/1
88 TABLET ORAL DAILY
Qty: 90 TABLET | Refills: 1 | Status: SHIPPED | OUTPATIENT
Start: 2017-12-18 | End: 2018-02-19 | Stop reason: SDUPTHER

## 2017-12-19 ENCOUNTER — OFFICE VISIT (OUTPATIENT)
Dept: FAMILY MEDICINE CLINIC | Facility: CLINIC | Age: 82
End: 2017-12-19

## 2017-12-19 VITALS
DIASTOLIC BLOOD PRESSURE: 70 MMHG | SYSTOLIC BLOOD PRESSURE: 128 MMHG | WEIGHT: 121.9 LBS | HEART RATE: 87 BPM | TEMPERATURE: 97.5 F | OXYGEN SATURATION: 96 % | BODY MASS INDEX: 22.43 KG/M2 | HEIGHT: 62 IN

## 2017-12-19 DIAGNOSIS — F42.4 EXCORIATION (SKIN-PICKING) DISORDER: Primary | ICD-10-CM

## 2017-12-19 PROCEDURE — 99213 OFFICE O/P EST LOW 20 MIN: CPT | Performed by: FAMILY MEDICINE

## 2017-12-19 NOTE — PROGRESS NOTES
"Subjective   Jazmine Brown is a 89 y.o. female.     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  She states she is \"a \".  He's had some increased stressors lately.  She has stopped \"picking\" in the last few days.  It feels irritated and tender.  She's had some URI symptoms also.  No shortness of breath.  Her primary care physician sent in some doxycycline at low-dose 80 mg twice a day starting yesterday.  No fever.  She's otherwise doing well.      The following portions of the patient's history were reviewed and updated as appropriate: allergies, current medications, past family history, past medical history, past social history, past surgical history and problem list.          Review of Systems   Constitutional: Negative for fever.   Skin: Positive for rash.       Objective   Blood pressure 128/70, pulse 87, temperature 97.5 °F (36.4 °C), temperature source Oral, height 157 cm (61.81\"), weight 55.3 kg (121 lb 14.4 oz), SpO2 96 %, not currently breastfeeding.  Physical Exam   Constitutional: No distress.   HENT:   Head: Atraumatic.   Pulmonary/Chest: Effort normal.   Skin: Skin is warm and dry. She is not diaphoretic.   Posterior neck and upper shoulders reveals a excoriated slightly erythematous macular rash with some areas of punctate ulcers.  No vesicles.  Bilateral.  Symmetric.  She also has similar lesions on the posterior extensor surfaces of the arms.       Assessment/Plan   Jazmine was seen today for rash.    Diagnoses and all orders for this visit:    Excoriation (skin-picking) disorder    Other orders  -     triamcinolone (KENALOG) 0.1 % ointment; Apply  topically 2 (Two) Times a Day As Needed for Irritation.    Excoriation disorder.  Questionable secondary cellulitis.  She was started on the doxycycline yesterday which I recommend " she continue.  I believe this is more likely an irritant dermatitis with no infection, however.  Adding triamcinolone 0.1% ointment twice a day as needed #30 g with no refills.  She will call if fever worsening symptoms.

## 2017-12-19 NOTE — PROGRESS NOTES
Please call the patient regarding her abnormal result.  Spoke to pt informed her of her results  She knows to  rx

## 2017-12-21 LAB
BACTERIA UR CULT: ABNORMAL
BACTERIA UR CULT: ABNORMAL
OTHER ANTIBIOTIC SUSC ISLT: ABNORMAL

## 2018-01-17 ENCOUNTER — OFFICE VISIT (OUTPATIENT)
Dept: FAMILY MEDICINE CLINIC | Facility: CLINIC | Age: 83
End: 2018-01-17

## 2018-01-17 VITALS
OXYGEN SATURATION: 94 % | SYSTOLIC BLOOD PRESSURE: 114 MMHG | BODY MASS INDEX: 22.45 KG/M2 | TEMPERATURE: 97.7 F | DIASTOLIC BLOOD PRESSURE: 68 MMHG | HEART RATE: 112 BPM | WEIGHT: 122 LBS

## 2018-01-17 DIAGNOSIS — N30.00 ACUTE CYSTITIS WITHOUT HEMATURIA: ICD-10-CM

## 2018-01-17 DIAGNOSIS — H60.532 ACUTE CONTACT OTITIS EXTERNA OF LEFT EAR: ICD-10-CM

## 2018-01-17 DIAGNOSIS — R53.82 CHRONIC FATIGUE: ICD-10-CM

## 2018-01-17 DIAGNOSIS — I48.0 PAROXYSMAL ATRIAL FIBRILLATION (HCC): Primary | ICD-10-CM

## 2018-01-17 DIAGNOSIS — E03.9 ACQUIRED HYPOTHYROIDISM: ICD-10-CM

## 2018-01-17 LAB
BILIRUB BLD-MCNC: NEGATIVE MG/DL
CLARITY, POC: CLEAR
COLOR UR: YELLOW
GLUCOSE UR STRIP-MCNC: NEGATIVE MG/DL
INR PPP: 2.9 (ref 0.9–1.1)
KETONES UR QL: NEGATIVE
LEUKOCYTE EST, POC: ABNORMAL
NITRITE UR-MCNC: NEGATIVE MG/ML
PH UR: 6 [PH] (ref 5–8)
PROT UR STRIP-MCNC: NEGATIVE MG/DL
RBC # UR STRIP: ABNORMAL /UL
SP GR UR: 1 (ref 1–1.03)
UROBILINOGEN UR QL: NORMAL

## 2018-01-17 PROCEDURE — 85610 PROTHROMBIN TIME: CPT | Performed by: FAMILY MEDICINE

## 2018-01-17 PROCEDURE — 99213 OFFICE O/P EST LOW 20 MIN: CPT | Performed by: FAMILY MEDICINE

## 2018-01-17 PROCEDURE — 36416 COLLJ CAPILLARY BLOOD SPEC: CPT | Performed by: FAMILY MEDICINE

## 2018-01-17 RX ORDER — DOXYCYCLINE 50 MG/1
50 CAPSULE ORAL EVERY 12 HOURS SCHEDULED
Qty: 14 CAPSULE | Refills: 0 | Status: SHIPPED | OUTPATIENT
Start: 2018-01-17 | End: 2018-01-17

## 2018-01-17 RX ORDER — NEOMYCIN SULFATE, POLYMYXIN B SULFATE, HYDROCORTISONE 3.5; 10000; 1 MG/ML; [USP'U]/ML; MG/ML
3 SOLUTION/ DROPS AURICULAR (OTIC) 3 TIMES DAILY
Qty: 10 ML | Refills: 0 | Status: SHIPPED | OUTPATIENT
Start: 2018-01-17 | End: 2018-01-29 | Stop reason: SDUPTHER

## 2018-01-17 RX ORDER — NITROFURANTOIN 25; 75 MG/1; MG/1
100 CAPSULE ORAL EVERY 12 HOURS SCHEDULED
Qty: 14 CAPSULE | Refills: 0 | Status: SHIPPED | OUTPATIENT
Start: 2018-01-17 | End: 2018-02-16

## 2018-01-17 NOTE — PATIENT INSTRUCTIONS
This is a very nice 89-year-old who is here for acute cystitis.  She did very well on Macrobid before and the culture showed that her infection was sensitive to it, so I will repeat that.  I would like her to call if there is a problem.

## 2018-01-17 NOTE — PROGRESS NOTES
Subjective   Jazmine Brown is a 89 y.o. female presenting with   Chief Complaint   Patient presents with   • Earache   • Urinary Urgency   • Fatigue        HPI Comments: 89-year-old white female nonsmoker here for routine follow-up after admission for fatigue and congestive heart failure.  She tells me that she is doing well except she now has urinary urgency again and frequency and fatigue.  She also says her left ear started itching so she used a peter pin and then got a lot of blood.  I strongly urged her to never put anything into her ear except perhaps medicine or hydrogen peroxide    Earache      Fatigue   Associated symptoms include fatigue.        The following portions of the patient's history were reviewed and updated as appropriate: current medications, past family history, past medical history, past social history, past surgical history and problem list.    Review of Systems   Constitutional: Positive for fatigue.   HENT: Positive for ear pain.    All other systems reviewed and are negative.      Objective   Physical Exam   Constitutional: She is oriented to person, place, and time. She appears well-developed and well-nourished. No distress.   HENT:   Head: Normocephalic and atraumatic.   Left Ear: Left ear exhibits lacerations.   Ears:    Eyes: EOM are normal. Pupils are equal, round, and reactive to light.   Neck: Normal range of motion. Neck supple. No JVD present.   Cardiovascular: Regular rhythm.  Tachycardia present.    Pulmonary/Chest: Effort normal and breath sounds normal. No respiratory distress. She has no wheezes.   Abdominal: Soft. Bowel sounds are normal. She exhibits no distension. There is no tenderness.   Musculoskeletal: Normal range of motion. She exhibits no edema or tenderness.   Neurological: She is alert and oriented to person, place, and time.   Skin: Skin is warm and dry. She is not diaphoretic.   Psychiatric: She has a normal mood and affect. Her behavior is normal.   Nursing  note and vitals reviewed.      Assessment/Plan   Jazmine was seen today for earache, urinary urgency and fatigue.    Diagnoses and all orders for this visit:    Paroxysmal atrial fibrillation  -     POCT INR    Acquired hypothyroidism    Acute cystitis without hematuria    Chronic fatigue    Acute contact otitis externa of left ear    Other orders  -     Discontinue: doxycycline (MONODOX) 50 MG capsule; Take 1 capsule by mouth Every 12 (Twelve) Hours.  -     nitrofurantoin, macrocrystal-monohydrate, (MACROBID) 100 MG capsule; Take 1 capsule by mouth Every 12 (Twelve) Hours.  -     neomycin-polymyxin-hydrocortisone (CORTISPORIN) 1 % solution otic solution; Administer 3 drops into the left ear 3 (Three) Times a Day.                   I would like him to return for another visit in 6 month(s)

## 2018-01-18 ENCOUNTER — TELEPHONE (OUTPATIENT)
Dept: FAMILY MEDICINE CLINIC | Facility: CLINIC | Age: 83
End: 2018-01-18

## 2018-01-18 NOTE — TELEPHONE ENCOUNTER
Pharmacy called and stated there is an interaction between Doxycycline and Coumadin.  Is the RX okay to fill?

## 2018-01-18 NOTE — TELEPHONE ENCOUNTER
[facility] calling again is it ok to fill doxy? She is on warfarin do you want to monitor it. They have not filled yet.    [phone]    Spoke to pt and [facility] don't fill the doxy per verbal order dr bradford thanks

## 2018-01-29 RX ORDER — WARFARIN SODIUM 3 MG/1
TABLET ORAL
Qty: 40 TABLET | Refills: 5 | Status: SHIPPED | OUTPATIENT
Start: 2018-01-29 | End: 2018-03-22 | Stop reason: SDUPTHER

## 2018-01-29 RX ORDER — CARVEDILOL 6.25 MG/1
TABLET ORAL
Qty: 180 TABLET | Refills: 1 | Status: SHIPPED | OUTPATIENT
Start: 2018-01-29 | End: 2018-03-22 | Stop reason: SDUPTHER

## 2018-02-16 ENCOUNTER — OFFICE VISIT (OUTPATIENT)
Dept: FAMILY MEDICINE CLINIC | Facility: CLINIC | Age: 83
End: 2018-02-16

## 2018-02-16 VITALS
HEART RATE: 79 BPM | SYSTOLIC BLOOD PRESSURE: 118 MMHG | OXYGEN SATURATION: 92 % | WEIGHT: 128.6 LBS | BODY MASS INDEX: 23.66 KG/M2 | HEIGHT: 62 IN | DIASTOLIC BLOOD PRESSURE: 62 MMHG

## 2018-02-16 DIAGNOSIS — I48.0 PAROXYSMAL ATRIAL FIBRILLATION (HCC): Primary | ICD-10-CM

## 2018-02-16 DIAGNOSIS — R06.02 SHORTNESS OF BREATH: ICD-10-CM

## 2018-02-16 DIAGNOSIS — R53.82 CHRONIC FATIGUE: ICD-10-CM

## 2018-02-16 DIAGNOSIS — I50.42 CHRONIC COMBINED SYSTOLIC AND DIASTOLIC CONGESTIVE HEART FAILURE (HCC): ICD-10-CM

## 2018-02-16 DIAGNOSIS — R42 DIZZY: ICD-10-CM

## 2018-02-16 DIAGNOSIS — E03.9 ACQUIRED HYPOTHYROIDISM: ICD-10-CM

## 2018-02-16 LAB — INR PPP: 2.4 (ref 2–3)

## 2018-02-16 PROCEDURE — 99213 OFFICE O/P EST LOW 20 MIN: CPT | Performed by: FAMILY MEDICINE

## 2018-02-16 PROCEDURE — 85610 PROTHROMBIN TIME: CPT | Performed by: FAMILY MEDICINE

## 2018-02-16 PROCEDURE — 36416 COLLJ CAPILLARY BLOOD SPEC: CPT | Performed by: FAMILY MEDICINE

## 2018-02-16 NOTE — PROGRESS NOTES
"Subjective   Jazmine Brown is a 89 y.o. female presenting with No chief complaint on file.       HPI Comments: 89-year-old white female nonsmoker with a history of congestive heart failure and hypothyroidism and bronchospasm is here for follow-up.  She has an appointment to see her cardiologist next month, but says that she is quite fatigued and \"does not have any get up and go\".    She denies any chest pain or palpitations.  She does not have any leg swelling.  She says occasionally her left hand will feel hot or sometimes cold at night but does not have any visible change in coloration.    She mentions that she used a peter pin to clean out her left ear and saw a little blood.  I told her to absolutely never do that again and pointed out that she has a small abrasion in her ear canal       The following portions of the patient's history were reviewed and updated as appropriate: current medications, past family history, past medical history, past social history, past surgical history and problem list.    Review of Systems   Constitutional: Positive for fatigue.   Respiratory: Positive for shortness of breath.    All other systems reviewed and are negative.      Objective   Physical Exam   Constitutional: She is oriented to person, place, and time. She appears well-developed and well-nourished. No distress.   HENT:   Head: Normocephalic and atraumatic.   Left Ear: Left ear exhibits lacerations (very small abrasion that is hemostatic).   Ears:    Eyes: EOM are normal. Pupils are equal, round, and reactive to light.   Neck: Normal range of motion. Neck supple. No JVD present. No thyromegaly present.   Cardiovascular: Normal rate and regular rhythm.    Murmur (2/6 systolic ejection murmur) heard.  Pulmonary/Chest: Effort normal and breath sounds normal.   Musculoskeletal: Normal range of motion. She exhibits deformity (osteoarthritic deformity of hands). She exhibits no edema.   Neurological: She is alert and " oriented to person, place, and time.   Skin: Skin is warm and dry. Rash noted. She is not diaphoretic.        Psychiatric: She has a normal mood and affect. Her behavior is normal.   Nursing note and vitals reviewed.      Assessment/Plan   Diagnoses and all orders for this visit:    Paroxysmal atrial fibrillation  -     Comprehensive Metabolic Panel  -     TSH  -     CBC & Differential    Shortness of breath  -     Comprehensive Metabolic Panel  -     TSH  -     CBC & Differential    Acquired hypothyroidism  -     Comprehensive Metabolic Panel  -     TSH    Chronic fatigue  -     Comprehensive Metabolic Panel  -     TSH  -     CBC & Differential    Dizzy  -     Comprehensive Metabolic Panel  -     TSH  -     CBC & Differential                   I would like him to return for another visit in 3 month(s)

## 2018-02-16 NOTE — PATIENT INSTRUCTIONS
This is a very nice 89-year-old who is here for follow-up.  I will request blood work and notify her when the results are available.  I would like her to call if there is a problem.

## 2018-02-17 LAB
ALBUMIN SERPL-MCNC: 4.2 G/DL (ref 3.5–5.2)
ALBUMIN/GLOB SERPL: 1.8 G/DL
ALP SERPL-CCNC: 77 U/L (ref 39–117)
ALT SERPL-CCNC: 10 U/L (ref 1–33)
AST SERPL-CCNC: 25 U/L (ref 1–32)
BASOPHILS # BLD AUTO: 0.02 10*3/MM3 (ref 0–0.2)
BASOPHILS NFR BLD AUTO: 0.3 % (ref 0–1.5)
BILIRUB SERPL-MCNC: 0.5 MG/DL (ref 0.1–1.2)
BUN SERPL-MCNC: 22 MG/DL (ref 8–23)
BUN/CREAT SERPL: 22.9 (ref 7–25)
CALCIUM SERPL-MCNC: 8.9 MG/DL (ref 8.2–9.6)
CHLORIDE SERPL-SCNC: 100 MMOL/L (ref 98–107)
CO2 SERPL-SCNC: 33.8 MMOL/L (ref 22–29)
CREAT SERPL-MCNC: 0.96 MG/DL (ref 0.57–1)
DIFFERENTIAL COMMENT: NORMAL
EOSINOPHIL # BLD AUTO: 0.01 10*3/MM3 (ref 0–0.7)
EOSINOPHIL NFR BLD AUTO: 0.2 % (ref 0.3–6.2)
ERYTHROCYTE [DISTWIDTH] IN BLOOD BY AUTOMATED COUNT: 16.1 % (ref 11.7–13)
GFR SERPLBLD CREATININE-BSD FMLA CKD-EPI: 54 ML/MIN/1.73
GFR SERPLBLD CREATININE-BSD FMLA CKD-EPI: 66 ML/MIN/1.73
GLOBULIN SER CALC-MCNC: 2.4 GM/DL
GLUCOSE SERPL-MCNC: 93 MG/DL (ref 65–99)
HCT VFR BLD AUTO: 40.2 % (ref 35.6–45.5)
HGB BLD-MCNC: 11.8 G/DL (ref 11.9–15.5)
IMM GRANULOCYTES # BLD: 0 10*3/MM3 (ref 0–0.03)
IMM GRANULOCYTES NFR BLD: 0 % (ref 0–0.5)
LYMPHOCYTES # BLD AUTO: 0.83 10*3/MM3 (ref 0.9–4.8)
LYMPHOCYTES NFR BLD AUTO: 14.2 % (ref 19.6–45.3)
MCH RBC QN AUTO: 33.1 PG (ref 26.9–32)
MCHC RBC AUTO-ENTMCNC: 29.4 G/DL (ref 32.4–36.3)
MCV RBC AUTO: 112.6 FL (ref 80.5–98.2)
MONOCYTES # BLD AUTO: 0.52 10*3/MM3 (ref 0.2–1.2)
MONOCYTES NFR BLD AUTO: 8.9 % (ref 5–12)
NEUTROPHILS # BLD AUTO: 4.47 10*3/MM3 (ref 1.9–8.1)
NEUTROPHILS NFR BLD AUTO: 76.4 % (ref 42.7–76)
PLATELET # BLD AUTO: 276 10*3/MM3 (ref 140–500)
PLATELET BLD QL SMEAR: NORMAL
POTASSIUM SERPL-SCNC: 5 MMOL/L (ref 3.5–5.2)
PROT SERPL-MCNC: 6.6 G/DL (ref 6–8.5)
RBC # BLD AUTO: 3.57 10*6/MM3 (ref 3.9–5.2)
RBC MORPH BLD: NORMAL
SODIUM SERPL-SCNC: 142 MMOL/L (ref 136–145)
TSH SERPL DL<=0.005 MIU/L-ACNC: 0.16 MIU/ML (ref 0.27–4.2)
WBC # BLD AUTO: 5.85 10*3/MM3 (ref 4.5–10.7)

## 2018-02-19 RX ORDER — LEVOTHYROXINE SODIUM 0.07 MG/1
75 TABLET ORAL DAILY
Qty: 90 TABLET | Refills: 1 | Status: SHIPPED | OUTPATIENT
Start: 2018-02-19 | End: 2018-03-22 | Stop reason: SDUPTHER

## 2018-02-26 RX ORDER — PAROXETINE HYDROCHLORIDE 20 MG/1
TABLET, FILM COATED ORAL
Qty: 90 TABLET | Refills: 1 | Status: SHIPPED | OUTPATIENT
Start: 2018-02-26 | End: 2018-03-22 | Stop reason: SDUPTHER

## 2018-03-09 ENCOUNTER — TELEPHONE (OUTPATIENT)
Dept: FAMILY MEDICINE CLINIC | Facility: CLINIC | Age: 83
End: 2018-03-09

## 2018-03-12 RX ORDER — ERGOCALCIFEROL 1.25 MG/1
50000 CAPSULE ORAL
Qty: 12 CAPSULE | Refills: 3 | Status: SHIPPED | OUTPATIENT
Start: 2018-03-12 | End: 2018-03-22 | Stop reason: SDUPTHER

## 2018-03-20 ENCOUNTER — OFFICE VISIT (OUTPATIENT)
Dept: FAMILY MEDICINE CLINIC | Facility: CLINIC | Age: 83
End: 2018-03-20

## 2018-03-20 VITALS
HEART RATE: 91 BPM | SYSTOLIC BLOOD PRESSURE: 140 MMHG | OXYGEN SATURATION: 96 % | HEIGHT: 62 IN | DIASTOLIC BLOOD PRESSURE: 82 MMHG | BODY MASS INDEX: 22.63 KG/M2 | WEIGHT: 123 LBS

## 2018-03-20 DIAGNOSIS — I48.91 ATRIAL FIBRILLATION, UNSPECIFIED TYPE (HCC): Primary | ICD-10-CM

## 2018-03-20 DIAGNOSIS — R53.82 CHRONIC FATIGUE: ICD-10-CM

## 2018-03-20 DIAGNOSIS — R06.02 SHORTNESS OF BREATH: ICD-10-CM

## 2018-03-20 DIAGNOSIS — I50.42 CHRONIC COMBINED SYSTOLIC AND DIASTOLIC CONGESTIVE HEART FAILURE (HCC): ICD-10-CM

## 2018-03-20 DIAGNOSIS — I48.0 PAROXYSMAL ATRIAL FIBRILLATION (HCC): ICD-10-CM

## 2018-03-20 DIAGNOSIS — E03.9 ACQUIRED HYPOTHYROIDISM: ICD-10-CM

## 2018-03-20 LAB — INR PPP: 3.5 (ref 0.9–1.1)

## 2018-03-20 PROCEDURE — 85610 PROTHROMBIN TIME: CPT | Performed by: FAMILY MEDICINE

## 2018-03-20 PROCEDURE — 36416 COLLJ CAPILLARY BLOOD SPEC: CPT | Performed by: FAMILY MEDICINE

## 2018-03-20 PROCEDURE — 99213 OFFICE O/P EST LOW 20 MIN: CPT | Performed by: FAMILY MEDICINE

## 2018-03-20 NOTE — PATIENT INSTRUCTIONS
This is a very nice 89-year-old who is here for follow-up.  She continues to have fatigue and shortness of breath.  I will request a pulmonary consult.    Also her INR is a little too high, so I would like her to reduce the dose of warfarin and let us recheck her INR in 2 weeks.

## 2018-03-20 NOTE — PROGRESS NOTES
Subjective   Jazmine Brown is a 89 y.o. female presenting with   Chief Complaint   Patient presents with   • Atrial Fibrillation        89-year-old single white female nonsmoker here for follow-up for atrial fibrillation and combined congestive heart failure and chronic fatigue and shortness of breath.  She tells me that she is doing much better and feels well.  However, fortunately her son is here with her, and says that she still is very short of breath.  She apparently is starting to limit her trips to her Religion, which she very much enjoys, because the walk up to the Religion is too tiring for her.  I told her social contact is very important and ask her to consider allowing her son to use a wheelchair to take her to Religion.      Atrial Fibrillation   Symptoms include shortness of breath. Past medical history includes atrial fibrillation.        The following portions of the patient's history were reviewed and updated as appropriate: current medications, past family history, past medical history, past social history, past surgical history and problem list.    Review of Systems   Respiratory: Positive for shortness of breath.    All other systems reviewed and are negative.      Objective   Physical Exam   Constitutional: She is oriented to person, place, and time. She appears well-developed and well-nourished. No distress.   HENT:   Head: Normocephalic and atraumatic.   Eyes: EOM are normal. Pupils are equal, round, and reactive to light.   Neck: Normal range of motion. Neck supple. No JVD present. No thyromegaly present.   Cardiovascular: Normal rate.  An irregularly irregular rhythm present.   Murmur (1/6 systolic ejection murmur) heard.  Pulmonary/Chest: No respiratory distress (he becomes short of breathif she tries to talk for too long). She has no wheezes. She has no rhonchi.   Musculoskeletal: Normal range of motion. She exhibits no edema.   Lymphadenopathy:     She has no cervical adenopathy.    Neurological: She is alert and oriented to person, place, and time.   Skin: Skin is warm and dry. She is not diaphoretic.   Psychiatric: She has a normal mood and affect. Her behavior is normal.   Nursing note and vitals reviewed.      Assessment/Plan   Jazmine was seen today for atrial fibrillation.    Diagnoses and all orders for this visit:    Atrial fibrillation, unspecified type  -     POCT INR    Paroxysmal atrial fibrillation    Chronic combined systolic and diastolic congestive heart failure    Shortness of breath  -     Ambulatory Referral to Pulmonology    Acquired hypothyroidism    Chronic fatigue  -     Ambulatory Referral to Pulmonology                   I would like him to return for another visit in 3 month(s)

## 2018-03-22 RX ORDER — ALBUTEROL SULFATE 1.25 MG/3ML
SOLUTION RESPIRATORY (INHALATION)
Qty: 90 ML | Refills: 12 | Status: SHIPPED | OUTPATIENT
Start: 2018-03-22 | End: 2018-03-22 | Stop reason: SDUPTHER

## 2018-03-23 RX ORDER — ERGOCALCIFEROL 1.25 MG/1
50000 CAPSULE ORAL
Qty: 12 CAPSULE | Refills: 3 | Status: SHIPPED | OUTPATIENT
Start: 2018-03-23 | End: 2018-12-31 | Stop reason: SDUPTHER

## 2018-03-23 RX ORDER — WARFARIN SODIUM 3 MG/1
3 TABLET ORAL
Qty: 40 TABLET | Refills: 5 | Status: SHIPPED | OUTPATIENT
Start: 2018-03-23 | End: 2018-08-15 | Stop reason: SDUPTHER

## 2018-03-23 RX ORDER — ALBUTEROL SULFATE 1.25 MG/3ML
1 SOLUTION RESPIRATORY (INHALATION) EVERY 4 HOURS PRN
Qty: 90 ML | Refills: 12 | Status: SHIPPED | OUTPATIENT
Start: 2018-03-23 | End: 2018-08-29 | Stop reason: SDUPTHER

## 2018-03-23 RX ORDER — LEVOTHYROXINE SODIUM 0.07 MG/1
75 TABLET ORAL DAILY
Qty: 90 TABLET | Refills: 1 | Status: SHIPPED | OUTPATIENT
Start: 2018-03-23 | End: 2019-01-01 | Stop reason: SDUPTHER

## 2018-03-23 RX ORDER — CARVEDILOL 6.25 MG/1
6.25 TABLET ORAL 2 TIMES DAILY WITH MEALS
Qty: 180 TABLET | Refills: 1 | Status: SHIPPED | OUTPATIENT
Start: 2018-03-23 | End: 2018-08-15 | Stop reason: SDUPTHER

## 2018-03-23 RX ORDER — PAROXETINE HYDROCHLORIDE 20 MG/1
20 TABLET, FILM COATED ORAL DAILY
Qty: 90 TABLET | Refills: 1 | Status: SHIPPED | OUTPATIENT
Start: 2018-03-23 | End: 2019-01-01 | Stop reason: SDUPTHER

## 2018-03-23 RX ORDER — BUMETANIDE 0.5 MG/1
1 TABLET ORAL DAILY
Qty: 30 TABLET | Refills: 2 | Status: SHIPPED | OUTPATIENT
Start: 2018-03-23 | End: 2018-06-15 | Stop reason: SDUPTHER

## 2018-03-23 RX ORDER — LISINOPRIL 5 MG/1
5 TABLET ORAL DAILY
Qty: 90 TABLET | Refills: 1 | Status: SHIPPED | OUTPATIENT
Start: 2018-03-23 | End: 2018-07-07 | Stop reason: SDUPTHER

## 2018-04-03 ENCOUNTER — CLINICAL SUPPORT (OUTPATIENT)
Dept: FAMILY MEDICINE CLINIC | Facility: CLINIC | Age: 83
End: 2018-04-03

## 2018-04-03 DIAGNOSIS — I48.0 PAROXYSMAL ATRIAL FIBRILLATION (HCC): Primary | ICD-10-CM

## 2018-04-03 LAB — INR PPP: 4.2 (ref 0.9–1.1)

## 2018-04-03 PROCEDURE — 36416 COLLJ CAPILLARY BLOOD SPEC: CPT | Performed by: FAMILY MEDICINE

## 2018-04-03 PROCEDURE — 85610 PROTHROMBIN TIME: CPT | Performed by: FAMILY MEDICINE

## 2018-04-26 ENCOUNTER — TRANSCRIBE ORDERS (OUTPATIENT)
Dept: CARDIAC REHAB | Facility: HOSPITAL | Age: 83
End: 2018-04-26

## 2018-04-26 DIAGNOSIS — J98.4 RESTRICTIVE LUNG DISEASE: Primary | ICD-10-CM

## 2018-04-30 ENCOUNTER — OFFICE VISIT (OUTPATIENT)
Dept: CARDIAC REHAB | Facility: HOSPITAL | Age: 83
End: 2018-04-30

## 2018-04-30 VITALS
OXYGEN SATURATION: 94 % | DIASTOLIC BLOOD PRESSURE: 84 MMHG | WEIGHT: 124.4 LBS | RESPIRATION RATE: 18 BRPM | BODY MASS INDEX: 22.89 KG/M2 | HEART RATE: 75 BPM | HEIGHT: 62 IN | SYSTOLIC BLOOD PRESSURE: 142 MMHG

## 2018-04-30 DIAGNOSIS — J98.4 RESTRICTIVE LUNG DISEASE: Primary | ICD-10-CM

## 2018-04-30 PROCEDURE — G0238 OTH RESP PROC, INDIV: HCPCS

## 2018-04-30 NOTE — PROGRESS NOTES
Pulmonary Rehab Initial Assessment      Name: Jazmine Brown  :1926 Allergies:Codeine   MRN: [id number] 89 y.o. Physician: Adarsh Duckworth MD   Primary Diagnosis:    Diagnosis Plan   1. Restrictive lung disease      Event Date: 2018 Specialist: Scottie Lala MD   Secondary Diagnosis: Pn  Note Author: Violet Shukla RN     Cardiovascular History: Atrial Fib, CHF     EXERCISE AT HOME  no  n/a  N/A          Ambulatory Status:Independent  Ambulatory Fall Risk Assessed on Initial Visit: yes 6 Minute Walk Pre- Pulmonary Rehab:  Distance:594ft      RPE:3        RPD: 3              MPH: 1.1  Max. HR: 112        SPO2:93    MET: 1.8  Resting BP: 142/84     Peak BP: 140/80  Recovery BP: 140/84  Comments: Pt took two breaks to catch her breath. Breaks totaled 1 minute and 30 seconds.      NUTRITION  Lipids:no If yes, labs as follows;  Total: No components found for: CHOLESTEROL  HDL: No results found for: HDL Lipids continued:  LDL:No results found for: LDL  Triglyceride: No components found for: TRIGLYCERIDE   Weight Management:                 Weight: 124.0  Height: 61.81                                   BMI: Body mass index is 22.89 kg/m².  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 Heart sounds normal           Are you being hurt, hit, or freightened by anyone at home or in your life? no    Are you being neglected by a caregiver? No Shoulder flexibility/Range of motion: Above average     Recommended arm activity: Hand weights    Chair sit and reach within: 0 inches   Leg flexibility: Excellent    Leg Strength/Balance/Five times sit to stand: 16 seconds.   Pt did not use arms.    Recommended stretching: Chair   Balance: Fall Risk Assessment: Pt is unsteady walking but does not use a cane or walker.    Family attends [place]: no      COMORBIDITIES  Sleep Apnea: no If yes, Choose: N/A    Cancer: no    Stroke: no   Pneumonia: yes If yes, how many times 3    Osteoporosis: no    GI Problems: no Frequent colds/allergies: no    Other illnesses, surgeries, or comments Atrial Fib., CHF,  Appendectomy, Gall bladder surgery, Hysterectomy, Tonsillectomy, Thyroidectomy   PAIN:  Are you having pain? no  If yes where is pain? n/a If yes, pain scale: 1      PULMONARY:  Do you use a nebulizer?: yes   If yes, Duo-Neb BID    Do you use oxygen at home?: yes  If yes, amount 2L    With rest: no  With activity: no Do you have a daily cough?: no  If yes, choose: n/a    Do you every notice yourself wheezing?: yes  If yes, when: with too much walking.  Other pulmonary/breathing problems?: no   OTHER:  Do you have physical limitations?: no  If yes, n/a    Do you need assistance with ADLs?: yes  If yes, Pt's sons help with cleaning and shopping. Pt drives still in neighborhood. Do you climb stairs at home?:yes  If yes, how many times one time a week    Have you ever attended a pulmonary rehab?: no  If yes, n/a [facility] Dyspnea Scale: 0 - 4:  1 = shortness of breath when hurrying on the level or walking up a slight hill     Patient Goals: Pt would like to strengthen her legs and walk more with less SOA.     DISCHARGE PLANNING:  Do you have any home exercise equipment?:  no    What are you plans for continuing exercise after completion of pulmonary rehab? Pt was a member of [employer]     EDUCATION:  Pursed - lip breathing and Program information folder++  +    +       PRE-PROGRAM ASSESSMENT:  PFT Date: 4-    FEV1/FVC: 72 FEV1: 87    FVC: 86 DLCO: 31     Time of arrival: 9:45    Time of departure: 11:00           4/30/2018  10:05 AM  Violet Shukla RN

## 2018-05-08 ENCOUNTER — TREATMENT (OUTPATIENT)
Dept: CARDIAC REHAB | Facility: HOSPITAL | Age: 83
End: 2018-05-08

## 2018-05-08 DIAGNOSIS — J98.4 RESTRICTIVE LUNG DISEASE: Primary | ICD-10-CM

## 2018-05-08 PROCEDURE — G0238 OTH RESP PROC, INDIV: HCPCS

## 2018-05-10 ENCOUNTER — TREATMENT (OUTPATIENT)
Dept: CARDIAC REHAB | Facility: HOSPITAL | Age: 83
End: 2018-05-10

## 2018-05-10 DIAGNOSIS — J98.4 RESTRICTIVE LUNG DISEASE: Primary | ICD-10-CM

## 2018-05-10 PROCEDURE — G0238 OTH RESP PROC, INDIV: HCPCS

## 2018-05-11 ENCOUNTER — TELEPHONE (OUTPATIENT)
Dept: FAMILY MEDICINE CLINIC | Facility: CLINIC | Age: 83
End: 2018-05-11

## 2018-05-11 NOTE — TELEPHONE ENCOUNTER
Ask her if she minds pain and for herself it will be very expensive and there is no way in the world insurance will pay for that

## 2018-05-11 NOTE — TELEPHONE ENCOUNTER
Talked w/ pt and she said it wasn't the inhaler she was having trouble with, it was her water that goes in her nebulizer. She does not need a refill on her recuse inhaler.

## 2018-05-16 ENCOUNTER — OFFICE VISIT (OUTPATIENT)
Dept: FAMILY MEDICINE CLINIC | Facility: CLINIC | Age: 83
End: 2018-05-16

## 2018-05-16 VITALS
WEIGHT: 125 LBS | DIASTOLIC BLOOD PRESSURE: 75 MMHG | HEIGHT: 61 IN | SYSTOLIC BLOOD PRESSURE: 152 MMHG | HEART RATE: 80 BPM | TEMPERATURE: 97.6 F | OXYGEN SATURATION: 94 % | BODY MASS INDEX: 23.6 KG/M2

## 2018-05-16 DIAGNOSIS — R06.02 SHORTNESS OF BREATH: ICD-10-CM

## 2018-05-16 DIAGNOSIS — I48.0 PAROXYSMAL A-FIB (HCC): Primary | ICD-10-CM

## 2018-05-16 DIAGNOSIS — E03.9 ACQUIRED HYPOTHYROIDISM: ICD-10-CM

## 2018-05-16 DIAGNOSIS — I50.42 CHRONIC COMBINED SYSTOLIC AND DIASTOLIC CONGESTIVE HEART FAILURE (HCC): ICD-10-CM

## 2018-05-16 LAB — INR PPP: 1.1 (ref 0.9–1.1)

## 2018-05-16 PROCEDURE — 36416 COLLJ CAPILLARY BLOOD SPEC: CPT | Performed by: FAMILY MEDICINE

## 2018-05-16 PROCEDURE — 99213 OFFICE O/P EST LOW 20 MIN: CPT | Performed by: FAMILY MEDICINE

## 2018-05-16 PROCEDURE — 85610 PROTHROMBIN TIME: CPT | Performed by: FAMILY MEDICINE

## 2018-05-16 NOTE — PATIENT INSTRUCTIONS
This is a very nice 89-year-old who is here for follow-up.  Her INR today is 1.1 so I would like her to take 3 mg of warfarin daily and let us recheck her INR in 2 weeks.

## 2018-05-16 NOTE — PROGRESS NOTES
Subjective   Jazmine Brown is a 89 y.o. female presenting with   Chief Complaint   Patient presents with   • Follow-up     INR, 3 month checkup        This is an extremely nice 89-year-old who is involved in pulmonary rehabilitation for chronic shortness of breath.  She says that she has fatigue and shortness of breath but other than that she feels great.  She does not have any pain.  She does not have any memory issues.  She does not have any leg edema.    She was taking 3 mg of warfarin 5 days a week and 1.5 mg 2 days a week, but her INR is only 1.1, so I asked her to start taking her warfarin 3 mg every day and let us recheck her in 2 weeks.    She has not had any blood work lately to determine her complicating problems that might account for her fatigue and her shortness of breath, so I will request routine blood work today and notify her when the results are available.         The following portions of the patient's history were reviewed and updated as appropriate: current medications, past family history, past medical history, past social history, past surgical history and problem list.    Review of Systems   Constitutional: Positive for fatigue.   Respiratory: Positive for shortness of breath.    All other systems reviewed and are negative.      Objective   Physical Exam   Constitutional: She is oriented to person, place, and time. She appears well-developed and well-nourished.   HENT:   Head: Normocephalic and atraumatic.   Eyes: EOM are normal. Pupils are equal, round, and reactive to light.   Neck: Normal range of motion. Neck supple. No JVD present. No thyromegaly present.   Cardiovascular: Normal rate and regular rhythm.    Murmur (1/6 systolic ejection murmur but she is in normal sinus rhythm today) heard.  Pulmonary/Chest: Effort normal and breath sounds normal. No respiratory distress. She has no wheezes.   Musculoskeletal: Normal range of motion. She exhibits no edema.   Lymphadenopathy:     She  has no cervical adenopathy.   Neurological: She is alert and oriented to person, place, and time.   Skin: Skin is warm and dry.   Psychiatric: She has a normal mood and affect. Her behavior is normal.   Nursing note and vitals reviewed.      Assessment/Plan   Jazmine was seen today for follow-up.    Diagnoses and all orders for this visit:    Paroxysmal a-fib  -     POCT INR  -     CBC & Differential  -     TSH    Paroxysmal atrial fibrillation    Acquired hypothyroidism  -     CBC & Differential  -     TSH    Shortness of breath  -     CBC & Differential  -     TSH  -     Comprehensive Metabolic Panel    Chronic combined systolic and diastolic congestive heart failure  -     CBC & Differential  -     TSH  -     Comprehensive Metabolic Panel                   I would like him to return for another visit in 3 month(s)

## 2018-05-17 ENCOUNTER — TREATMENT (OUTPATIENT)
Dept: CARDIAC REHAB | Facility: HOSPITAL | Age: 83
End: 2018-05-17

## 2018-05-17 DIAGNOSIS — J98.4 RESTRICTIVE LUNG DISEASE: Primary | ICD-10-CM

## 2018-05-17 PROCEDURE — G0238 OTH RESP PROC, INDIV: HCPCS

## 2018-05-22 ENCOUNTER — TREATMENT (OUTPATIENT)
Dept: CARDIAC REHAB | Facility: HOSPITAL | Age: 83
End: 2018-05-22

## 2018-05-22 ENCOUNTER — TELEPHONE (OUTPATIENT)
Dept: FAMILY MEDICINE CLINIC | Facility: CLINIC | Age: 83
End: 2018-05-22

## 2018-05-22 DIAGNOSIS — J98.4 RESTRICTIVE LUNG DISEASE: Primary | ICD-10-CM

## 2018-05-22 PROCEDURE — G0238 OTH RESP PROC, INDIV: HCPCS

## 2018-05-29 ENCOUNTER — TREATMENT (OUTPATIENT)
Dept: CARDIAC REHAB | Facility: HOSPITAL | Age: 83
End: 2018-05-29

## 2018-05-29 DIAGNOSIS — J98.4 RESTRICTIVE LUNG DISEASE: Primary | ICD-10-CM

## 2018-05-29 PROCEDURE — G0238 OTH RESP PROC, INDIV: HCPCS

## 2018-05-31 ENCOUNTER — TREATMENT (OUTPATIENT)
Dept: CARDIAC REHAB | Facility: HOSPITAL | Age: 83
End: 2018-05-31

## 2018-05-31 DIAGNOSIS — J98.4 RESTRICTIVE LUNG DISEASE: Primary | ICD-10-CM

## 2018-05-31 PROCEDURE — G0238 OTH RESP PROC, INDIV: HCPCS

## 2018-06-05 ENCOUNTER — TREATMENT (OUTPATIENT)
Dept: CARDIAC REHAB | Facility: HOSPITAL | Age: 83
End: 2018-06-05

## 2018-06-05 DIAGNOSIS — J98.4 RESTRICTIVE LUNG DISEASE: Primary | ICD-10-CM

## 2018-06-05 PROCEDURE — G0238 OTH RESP PROC, INDIV: HCPCS

## 2018-06-07 ENCOUNTER — TREATMENT (OUTPATIENT)
Dept: CARDIAC REHAB | Facility: HOSPITAL | Age: 83
End: 2018-06-07

## 2018-06-07 DIAGNOSIS — J98.4 RESTRICTIVE LUNG DISEASE: Primary | ICD-10-CM

## 2018-06-07 PROCEDURE — G0238 OTH RESP PROC, INDIV: HCPCS

## 2018-06-12 ENCOUNTER — TREATMENT (OUTPATIENT)
Dept: CARDIAC REHAB | Facility: HOSPITAL | Age: 83
End: 2018-06-12

## 2018-06-12 DIAGNOSIS — J98.4 RESTRICTIVE LUNG DISEASE: Primary | ICD-10-CM

## 2018-06-12 PROCEDURE — G0238 OTH RESP PROC, INDIV: HCPCS

## 2018-06-14 ENCOUNTER — TREATMENT (OUTPATIENT)
Dept: CARDIAC REHAB | Facility: HOSPITAL | Age: 83
End: 2018-06-14

## 2018-06-14 DIAGNOSIS — J98.4 RESTRICTIVE LUNG DISEASE: Primary | ICD-10-CM

## 2018-06-14 PROCEDURE — G0238 OTH RESP PROC, INDIV: HCPCS

## 2018-06-15 RX ORDER — BUMETANIDE 0.5 MG/1
TABLET ORAL
Qty: 60 TABLET | Refills: 2 | Status: SHIPPED | OUTPATIENT
Start: 2018-06-15 | End: 2018-06-29 | Stop reason: SDUPTHER

## 2018-06-19 ENCOUNTER — TREATMENT (OUTPATIENT)
Dept: CARDIAC REHAB | Facility: HOSPITAL | Age: 83
End: 2018-06-19

## 2018-06-19 DIAGNOSIS — J98.4 RESTRICTIVE LUNG DISEASE: Primary | ICD-10-CM

## 2018-06-19 PROCEDURE — G0238 OTH RESP PROC, INDIV: HCPCS

## 2018-06-21 ENCOUNTER — TREATMENT (OUTPATIENT)
Dept: CARDIAC REHAB | Facility: HOSPITAL | Age: 83
End: 2018-06-21

## 2018-06-21 DIAGNOSIS — J98.4 RESTRICTIVE LUNG DISEASE: Primary | ICD-10-CM

## 2018-06-21 PROCEDURE — G0238 OTH RESP PROC, INDIV: HCPCS

## 2018-06-28 ENCOUNTER — TREATMENT (OUTPATIENT)
Dept: CARDIAC REHAB | Facility: HOSPITAL | Age: 83
End: 2018-06-28

## 2018-06-28 DIAGNOSIS — J98.4 RESTRICTIVE LUNG DISEASE: Primary | ICD-10-CM

## 2018-06-28 PROCEDURE — G0238 OTH RESP PROC, INDIV: HCPCS

## 2018-06-29 RX ORDER — BUMETANIDE 0.5 MG/1
1 TABLET ORAL DAILY
Qty: 180 TABLET | Refills: 1 | Status: SHIPPED | OUTPATIENT
Start: 2018-06-29 | End: 2019-01-01 | Stop reason: SDUPTHER

## 2018-06-29 RX ORDER — BUMETANIDE 0.5 MG/1
TABLET ORAL
Qty: 30 TABLET | Refills: 5 | Status: SHIPPED | OUTPATIENT
Start: 2018-06-29 | End: 2018-06-29 | Stop reason: SDUPTHER

## 2018-07-03 ENCOUNTER — TREATMENT (OUTPATIENT)
Dept: CARDIAC REHAB | Facility: HOSPITAL | Age: 83
End: 2018-07-03

## 2018-07-03 DIAGNOSIS — J98.4 RESTRICTIVE LUNG DISEASE: Primary | ICD-10-CM

## 2018-07-03 PROCEDURE — G0238 OTH RESP PROC, INDIV: HCPCS

## 2018-07-05 ENCOUNTER — TREATMENT (OUTPATIENT)
Dept: CARDIAC REHAB | Facility: HOSPITAL | Age: 83
End: 2018-07-05

## 2018-07-05 DIAGNOSIS — J98.4 RESTRICTIVE LUNG DISEASE: Primary | ICD-10-CM

## 2018-07-05 PROCEDURE — G0238 OTH RESP PROC, INDIV: HCPCS

## 2018-07-09 RX ORDER — LISINOPRIL 5 MG/1
TABLET ORAL
Qty: 90 TABLET | Refills: 1 | Status: SHIPPED | OUTPATIENT
Start: 2018-07-09 | End: 2019-01-01 | Stop reason: SDUPTHER

## 2018-07-10 ENCOUNTER — TREATMENT (OUTPATIENT)
Dept: CARDIAC REHAB | Facility: HOSPITAL | Age: 83
End: 2018-07-10

## 2018-07-10 DIAGNOSIS — J98.4 RESTRICTIVE LUNG DISEASE: Primary | ICD-10-CM

## 2018-07-10 PROCEDURE — G0238 OTH RESP PROC, INDIV: HCPCS

## 2018-07-17 ENCOUNTER — TREATMENT (OUTPATIENT)
Dept: CARDIAC REHAB | Facility: HOSPITAL | Age: 83
End: 2018-07-17

## 2018-07-17 DIAGNOSIS — J98.4 RESTRICTIVE LUNG DISEASE: Primary | ICD-10-CM

## 2018-07-17 PROCEDURE — G0238 OTH RESP PROC, INDIV: HCPCS

## 2018-07-19 ENCOUNTER — TREATMENT (OUTPATIENT)
Dept: CARDIAC REHAB | Facility: HOSPITAL | Age: 83
End: 2018-07-19

## 2018-07-19 DIAGNOSIS — J98.4 RESTRICTIVE LUNG DISEASE: Primary | ICD-10-CM

## 2018-07-19 PROCEDURE — G0238 OTH RESP PROC, INDIV: HCPCS

## 2018-07-26 ENCOUNTER — TREATMENT (OUTPATIENT)
Dept: CARDIAC REHAB | Facility: HOSPITAL | Age: 83
End: 2018-07-26

## 2018-07-26 DIAGNOSIS — J98.4 RESTRICTIVE LUNG DISEASE: Primary | ICD-10-CM

## 2018-07-26 PROCEDURE — G0238 OTH RESP PROC, INDIV: HCPCS

## 2018-07-31 ENCOUNTER — TREATMENT (OUTPATIENT)
Dept: CARDIAC REHAB | Facility: HOSPITAL | Age: 83
End: 2018-07-31

## 2018-07-31 DIAGNOSIS — J98.4 RESTRICTIVE LUNG DISEASE: Primary | ICD-10-CM

## 2018-07-31 PROCEDURE — G0238 OTH RESP PROC, INDIV: HCPCS

## 2018-08-02 ENCOUNTER — TREATMENT (OUTPATIENT)
Dept: CARDIAC REHAB | Facility: HOSPITAL | Age: 83
End: 2018-08-02

## 2018-08-02 DIAGNOSIS — J98.4 RESTRICTIVE LUNG DISEASE: Primary | ICD-10-CM

## 2018-08-02 PROCEDURE — G0238 OTH RESP PROC, INDIV: HCPCS

## 2018-08-07 ENCOUNTER — APPOINTMENT (OUTPATIENT)
Dept: CARDIAC REHAB | Facility: HOSPITAL | Age: 83
End: 2018-08-07

## 2018-08-09 ENCOUNTER — APPOINTMENT (OUTPATIENT)
Dept: CARDIAC REHAB | Facility: HOSPITAL | Age: 83
End: 2018-08-09

## 2018-08-14 ENCOUNTER — APPOINTMENT (OUTPATIENT)
Dept: CARDIAC REHAB | Facility: HOSPITAL | Age: 83
End: 2018-08-14

## 2018-08-15 ENCOUNTER — OFFICE VISIT (OUTPATIENT)
Dept: FAMILY MEDICINE CLINIC | Facility: CLINIC | Age: 83
End: 2018-08-15

## 2018-08-15 VITALS
SYSTOLIC BLOOD PRESSURE: 138 MMHG | OXYGEN SATURATION: 94 % | BODY MASS INDEX: 23.26 KG/M2 | DIASTOLIC BLOOD PRESSURE: 80 MMHG | HEIGHT: 61 IN | TEMPERATURE: 97.9 F | WEIGHT: 123.2 LBS | HEART RATE: 90 BPM

## 2018-08-15 DIAGNOSIS — R53.82 CHRONIC FATIGUE: ICD-10-CM

## 2018-08-15 DIAGNOSIS — R06.02 SHORTNESS OF BREATH: ICD-10-CM

## 2018-08-15 DIAGNOSIS — I50.42 CHRONIC COMBINED SYSTOLIC AND DIASTOLIC CONGESTIVE HEART FAILURE (HCC): ICD-10-CM

## 2018-08-15 DIAGNOSIS — E03.9 ACQUIRED HYPOTHYROIDISM: ICD-10-CM

## 2018-08-15 DIAGNOSIS — I10 ESSENTIAL HYPERTENSION: ICD-10-CM

## 2018-08-15 DIAGNOSIS — I48.0 PAROXYSMAL A-FIB (HCC): ICD-10-CM

## 2018-08-15 DIAGNOSIS — Z00.00 MEDICARE ANNUAL WELLNESS VISIT, INITIAL: Primary | ICD-10-CM

## 2018-08-15 LAB — INR PPP: 1.7 (ref 0.9–1.1)

## 2018-08-15 PROCEDURE — 36416 COLLJ CAPILLARY BLOOD SPEC: CPT | Performed by: FAMILY MEDICINE

## 2018-08-15 PROCEDURE — G0439 PPPS, SUBSEQ VISIT: HCPCS | Performed by: FAMILY MEDICINE

## 2018-08-15 PROCEDURE — 90670 PCV13 VACCINE IM: CPT | Performed by: FAMILY MEDICINE

## 2018-08-15 PROCEDURE — 85610 PROTHROMBIN TIME: CPT | Performed by: FAMILY MEDICINE

## 2018-08-15 PROCEDURE — 90471 IMMUNIZATION ADMIN: CPT | Performed by: FAMILY MEDICINE

## 2018-08-15 PROCEDURE — 90715 TDAP VACCINE 7 YRS/> IM: CPT | Performed by: FAMILY MEDICINE

## 2018-08-15 PROCEDURE — G0009 ADMIN PNEUMOCOCCAL VACCINE: HCPCS | Performed by: FAMILY MEDICINE

## 2018-08-15 RX ORDER — WARFARIN SODIUM 3 MG/1
3 TABLET ORAL
Qty: 40 TABLET | Refills: 5 | Status: SHIPPED | OUTPATIENT
Start: 2018-08-15 | End: 2018-12-13 | Stop reason: SDUPTHER

## 2018-08-15 RX ORDER — CARVEDILOL 6.25 MG/1
6.25 TABLET ORAL 2 TIMES DAILY WITH MEALS
Qty: 180 TABLET | Refills: 1 | Status: SHIPPED | OUTPATIENT
Start: 2018-08-15 | End: 2019-03-02 | Stop reason: SDUPTHER

## 2018-08-15 RX ORDER — ALBUTEROL SULFATE 90 UG/1
2 AEROSOL, METERED RESPIRATORY (INHALATION) EVERY 4 HOURS PRN
Qty: 18 G | Refills: 5 | Status: SHIPPED | OUTPATIENT
Start: 2018-08-15 | End: 2018-08-29 | Stop reason: SDUPTHER

## 2018-08-15 NOTE — PROGRESS NOTES
QUICK REFERENCE INFORMATION:  The ABCs of the Annual Wellness Visit    Initial Medicare Wellness Visit    HEALTH RISK ASSESSMENT    2/11/1926    Recent Hospitalizations:  No hospitalization(s) within the last year..        Current Medical Providers:  Patient Care Team:  Adarsh Duckworth MD as PCP - General  Adarsh Duckworth MD as PCP - Claims Attributed        Smoking Status:  History   Smoking Status   • Never Smoker   Smokeless Tobacco   • Never Used       Alcohol Consumption:  History   Alcohol Use No       Depression Screen:   PHQ-2/PHQ-9 Depression Screening 8/15/2018   Little interest or pleasure in doing things 0   Feeling down, depressed, or hopeless 0   Trouble falling or staying asleep, or sleeping too much -   Feeling tired or having little energy -   Poor appetite or overeating -   Feeling bad about yourself - or that you are a failure or have let yourself or your family down -   Trouble concentrating on things, such as reading the newspaper or watching television -   Moving or speaking so slowly that other people could have noticed. Or the opposite - being so fidgety or restless that you have been moving around a lot more than usual -   Thoughts that you would be better off dead, or of hurting yourself in some way -   Total Score 0       Health Habits and Functional and Cognitive Screening:  Functional & Cognitive Status 8/15/2018   Do you have difficulty preparing food and eating? No   Do you have difficulty bathing yourself, getting dressed or grooming yourself? No   Do you have difficulty using the toilet? No   Do you have difficulty moving around from place to place? No   Do you have trouble with steps or getting out of a bed or a chair? No   In the past year have you fallen or experienced a near fall? Yes   Current Diet Well Balanced Diet   Dental Exam Up to date   Eye Exam Up to date   Exercise (times per week) 2 times per week   Current Exercise Activities Include Cardiovasular Workout on  Exercise Equipment   Do you need help using the phone?  No   Are you deaf or do you have serious difficulty hearing?  No   Do you need help with transportation? Yes   Do you need help shopping? Yes   Do you need help preparing meals?  No   Do you need help with housework?  Yes   Do you need help with laundry? Yes   Do you need help taking your medications? No   Do you need help managing money? No   Do you ever drive or ride in a car without wearing a seat belt? No   Have you felt unusual stress, anger or loneliness in the last month? No   Who do you live with? Alone   If you need help, do you have trouble finding someone available to you? No   Have you been bothered in the last four weeks by sexual problems? No   Do you have difficulty concentrating, remembering or making decisions? Yes           Does the patient have evidence of cognitive impairment? No    Asiprin use counseling: Does not need ASA (and currently is not on it)      Recent Lab Results:    Visual Acuity:  No exam data present    Age-appropriate Screening Schedule:  Refer to the list below for future screening recommendations based on patient's age, sex and/or medical conditions. Orders for these recommended tests are listed in the plan section. The patient has been provided with a written plan.    Health Maintenance   Topic Date Due   • TDAP/TD VACCINES (1 - Tdap) 02/11/1945   • ZOSTER VACCINE (1 of 2) 02/11/1976   • PNEUMOCOCCAL VACCINES (65+ LOW/MEDIUM RISK) (1 of 2 - PCV13) 02/11/1991   • INFLUENZA VACCINE  08/01/2018   • MAMMOGRAM  03/14/2019        Subjective   History of Present Illness    Jazmine Brown is a 89 y.o. female who presents for an Annual Wellness Visit.    The following portions of the patient's history were reviewed and updated as appropriate: current medications, past family history, past medical history, past social history, past surgical history and problem list.    Outpatient Medications Prior to Visit   Medication Sig  Dispense Refill   • albuterol (ACCUNEB) 1.25 MG/3ML nebulizer solution Take 3 mL by nebulization Every 4 (Four) Hours As Needed for Wheezing. 90 mL 12   • albuterol (PROAIR HFA) 108 (90 Base) MCG/ACT inhaler Inhale 2 puffs Every 4 (Four) Hours As Needed for Wheezing or Shortness of Air. 18 g 5   • bumetanide (BUMEX) 0.5 MG tablet Take 2 tablets by mouth Daily. 180 tablet 1   • carvedilol (COREG) 6.25 MG tablet Take 1 tablet by mouth 2 (Two) Times a Day With Meals. 180 tablet 1   • levothyroxine (SYNTHROID, LEVOTHROID) 75 MCG tablet Take 1 tablet by mouth Daily. 90 tablet 1   • lisinopril (PRINIVIL,ZESTRIL) 5 MG tablet TAKE 1 TABLET BY MOUTH ONCE DAILY 90 tablet 1   • neomycin-polymyxin-hydrocortisone (CORTISPORIN) 3.5-55159-5 otic solution ADMINSTER 2 DROPS INTO BOTH EARS 3 TIMES DAILY 10 mL 0   • PARoxetine (PAXIL) 20 MG tablet Take 1 tablet by mouth Daily. 90 tablet 1   • vitamin D (ERGOCALCIFEROL) 42427 units capsule capsule Take 1 capsule by mouth Every 7 (Seven) Days. 12 capsule 3   • warfarin (COUMADIN) 3 MG tablet Take 1 tablet by mouth Daily. 40 tablet 5   • triamcinolone (KENALOG) 0.1 % ointment Apply  topically 2 (Two) Times a Day As Needed for Irritation. 30 g 0     No facility-administered medications prior to visit.        Patient Active Problem List   Diagnosis   • Fatigue   • Acquired hypothyroidism   • Paroxysmal a-fib (CMS/HCC)   • Acute cystitis without hematuria   • Shortness of breath   • Chronic combined systolic and diastolic congestive heart failure (CMS/HCC)   • Acute contact otitis externa of left ear   • Essential hypertension   • Chronic fatigue       Advance Care Planning:  power of  for healthcare on file    Identification of Risk Factors:  Risk factors include: inactivity, increased fall risk and isolation.    Review of Systems   Constitutional: Positive for fatigue.   Respiratory: Positive for shortness of breath and wheezing. Negative for chest tightness.    All other systems  "reviewed and are negative.      Compared to one year ago, the patient feels her physical health is the same.  Compared to one year ago, the patient feels her mental health is the same.    Objective     Physical Exam   Constitutional: She is oriented to person, place, and time. She appears well-developed and well-nourished.   HENT:   Head: Normocephalic and atraumatic.       Mouth/Throat: Oropharynx is clear and moist. No oropharyngeal exudate.   Eyes: Pupils are equal, round, and reactive to light. EOM are normal.   Neck: Normal range of motion. Neck supple. No thyromegaly present.   Cardiovascular: Normal rate and regular rhythm.    Murmur (on over 6 systolic ejection murmur butin normal sinus rhythm) heard.  Pulmonary/Chest: Effort normal. No respiratory distress. She has no wheezes. She has rhonchi in the right upper field and the left upper field.   Abdominal: Soft. Bowel sounds are normal.   Musculoskeletal: Normal range of motion. She exhibits deformity (osteoarthritic deformity of hands).   Neurological: She is alert and oriented to person, place, and time.   Skin: Skin is warm and dry.   Psychiatric: She has a normal mood and affect. Her behavior is normal.   Nursing note and vitals reviewed.      Vitals:    08/15/18 1411   BP: 138/80   Pulse: 90   Temp: 97.9 °F (36.6 °C)   SpO2: 94%   Weight: 55.9 kg (123 lb 3.2 oz)   Height: 154.9 cm (61\")       Patient's Body mass index is 23.28 kg/m². BMI is within normal parameters. No follow-up required.      Assessment/Plan   Patient Self-Management and Personalized Health Advice  The patient has been provided with information about: exercise and fall prevention and preventive services including:   · Pneumococcal vaccine , TdaP vaccine.    Visit Diagnoses:    ICD-10-CM ICD-9-CM   1. Medicare annual wellness visit, initial Z00.00 V70.0   2. Paroxysmal a-fib (CMS/[facility]) I48.0 427.31   3. Acquired hypothyroidism E03.9 244.9   4. Essential hypertension I10 401.9   5. " Chronic fatigue R53.82 780.79   6. Shortness of breath R06.02 786.05   7. Chronic combined systolic and diastolic congestive heart failure (CMS/HCC) I50.42 428.42     428.0       Orders Placed This Encounter   Procedures   • Comprehensive Metabolic Panel   • TSH   • POC INR   • CBC & Differential     Order Specific Question:   Manual Differential     Answer:   No       Outpatient Encounter Prescriptions as of 8/15/2018   Medication Sig Dispense Refill   • albuterol (ACCUNEB) 1.25 MG/3ML nebulizer solution Take 3 mL by nebulization Every 4 (Four) Hours As Needed for Wheezing. 90 mL 12   • albuterol (PROAIR HFA) 108 (90 Base) MCG/ACT inhaler Inhale 2 puffs Every 4 (Four) Hours As Needed for Wheezing or Shortness of Air. 18 g 5   • bumetanide (BUMEX) 0.5 MG tablet Take 2 tablets by mouth Daily. 180 tablet 1   • carvedilol (COREG) 6.25 MG tablet Take 1 tablet by mouth 2 (Two) Times a Day With Meals. 180 tablet 1   • levothyroxine (SYNTHROID, LEVOTHROID) 75 MCG tablet Take 1 tablet by mouth Daily. 90 tablet 1   • lisinopril (PRINIVIL,ZESTRIL) 5 MG tablet TAKE 1 TABLET BY MOUTH ONCE DAILY 90 tablet 1   • neomycin-polymyxin-hydrocortisone (CORTISPORIN) 3.5-74558-8 otic solution ADMINSTER 2 DROPS INTO BOTH EARS 3 TIMES DAILY 10 mL 0   • PARoxetine (PAXIL) 20 MG tablet Take 1 tablet by mouth Daily. 90 tablet 1   • vitamin D (ERGOCALCIFEROL) 00289 units capsule capsule Take 1 capsule by mouth Every 7 (Seven) Days. 12 capsule 3   • warfarin (COUMADIN) 3 MG tablet Take 1 tablet by mouth Daily. 40 tablet 5   • triamcinolone (KENALOG) 0.1 % ointment Apply  topically 2 (Two) Times a Day As Needed for Irritation. 30 g 0     No facility-administered encounter medications on file as of 8/15/2018.        Reviewed use of high risk medication in the elderly: yes  Reviewed for potential of harmful drug interactions in the elderly: yes    Follow Up:  Return in about 6 months (around 2/15/2019) for Recheck.     An After Visit Summary and  PPPS with all of these plans were given to the patient.

## 2018-08-15 NOTE — PATIENT INSTRUCTIONS
This is an extremely nice 89-year-old who is here for her annual Medicare wellness visit and also for follow-up on her fatigue and shortness of breath and thyroid status.  I will request blood work and notify her when the results are available.

## 2018-08-16 ENCOUNTER — APPOINTMENT (OUTPATIENT)
Dept: CARDIAC REHAB | Facility: HOSPITAL | Age: 83
End: 2018-08-16

## 2018-08-16 LAB
ALBUMIN SERPL-MCNC: 4 G/DL (ref 3.5–5.2)
ALBUMIN/GLOB SERPL: 1.5 G/DL
ALP SERPL-CCNC: 68 U/L (ref 39–117)
ALT SERPL-CCNC: 10 U/L (ref 1–33)
AST SERPL-CCNC: 29 U/L (ref 1–32)
BASOPHILS # BLD AUTO: 0.02 10*3/MM3 (ref 0–0.2)
BASOPHILS NFR BLD AUTO: 0.2 % (ref 0–1.5)
BILIRUB SERPL-MCNC: 0.4 MG/DL (ref 0.1–1.2)
BUN SERPL-MCNC: 15 MG/DL (ref 8–23)
BUN/CREAT SERPL: 16.7 (ref 7–25)
CALCIUM SERPL-MCNC: 9.3 MG/DL (ref 8.2–9.6)
CHLORIDE SERPL-SCNC: 104 MMOL/L (ref 98–107)
CO2 SERPL-SCNC: 25.7 MMOL/L (ref 22–29)
CREAT SERPL-MCNC: 0.9 MG/DL (ref 0.57–1)
DIFFERENTIAL COMMENT: NORMAL
EOSINOPHIL # BLD AUTO: 0 10*3/MM3 (ref 0–0.7)
EOSINOPHIL NFR BLD AUTO: 0 % (ref 0.3–6.2)
ERYTHROCYTE [DISTWIDTH] IN BLOOD BY AUTOMATED COUNT: 14.8 % (ref 11.7–13)
GLOBULIN SER CALC-MCNC: 2.6 GM/DL
GLUCOSE SERPL-MCNC: 117 MG/DL (ref 65–99)
HCT VFR BLD AUTO: 39.7 % (ref 35.6–45.5)
HGB BLD-MCNC: 12.2 G/DL (ref 11.9–15.5)
IMM GRANULOCYTES # BLD: 0.01 10*3/MM3 (ref 0–0.03)
IMM GRANULOCYTES NFR BLD: 0.1 % (ref 0–0.5)
LYMPHOCYTES # BLD AUTO: 0.65 10*3/MM3 (ref 0.9–4.8)
LYMPHOCYTES NFR BLD AUTO: 7.9 % (ref 19.6–45.3)
MCH RBC QN AUTO: 35.9 PG (ref 26.9–32)
MCHC RBC AUTO-ENTMCNC: 30.7 G/DL (ref 32.4–36.3)
MCV RBC AUTO: 116.8 FL (ref 80.5–98.2)
MONOCYTES # BLD AUTO: 0.44 10*3/MM3 (ref 0.2–1.2)
MONOCYTES NFR BLD AUTO: 5.3 % (ref 5–12)
NEUTROPHILS # BLD AUTO: 7.16 10*3/MM3 (ref 1.9–8.1)
NEUTROPHILS NFR BLD AUTO: 86.6 % (ref 42.7–76)
NRBC BLD AUTO-RTO: 0 /100 WBC (ref 0–0)
PLATELET # BLD AUTO: 329 10*3/MM3 (ref 140–500)
PLATELET BLD QL SMEAR: NORMAL
POTASSIUM SERPL-SCNC: 4.7 MMOL/L (ref 3.5–5.2)
PROT SERPL-MCNC: 6.6 G/DL (ref 6–8.5)
RBC # BLD AUTO: 3.4 10*6/MM3 (ref 3.9–5.2)
RBC MORPH BLD: NORMAL
SODIUM SERPL-SCNC: 142 MMOL/L (ref 136–145)
TSH SERPL DL<=0.005 MIU/L-ACNC: 2.31 MIU/ML (ref 0.27–4.2)
WBC # BLD AUTO: 8.27 10*3/MM3 (ref 4.5–10.7)

## 2018-08-21 ENCOUNTER — APPOINTMENT (OUTPATIENT)
Dept: CARDIAC REHAB | Facility: HOSPITAL | Age: 83
End: 2018-08-21

## 2018-08-23 ENCOUNTER — APPOINTMENT (OUTPATIENT)
Dept: CARDIAC REHAB | Facility: HOSPITAL | Age: 83
End: 2018-08-23

## 2018-08-28 ENCOUNTER — APPOINTMENT (OUTPATIENT)
Dept: CARDIAC REHAB | Facility: HOSPITAL | Age: 83
End: 2018-08-28

## 2018-08-29 ENCOUNTER — TELEPHONE (OUTPATIENT)
Dept: FAMILY MEDICINE CLINIC | Facility: CLINIC | Age: 83
End: 2018-08-29

## 2018-08-29 RX ORDER — ALBUTEROL SULFATE 90 UG/1
2 AEROSOL, METERED RESPIRATORY (INHALATION) EVERY 4 HOURS PRN
Qty: 18 G | Refills: 5 | Status: ON HOLD | OUTPATIENT
Start: 2018-08-29 | End: 2020-01-01

## 2018-08-29 RX ORDER — ALBUTEROL SULFATE 1.25 MG/3ML
1 SOLUTION RESPIRATORY (INHALATION) EVERY 4 HOURS PRN
Qty: 90 ML | Refills: 12 | Status: SHIPPED | OUTPATIENT
Start: 2018-08-29 | End: 2018-08-31 | Stop reason: SDUPTHER

## 2018-08-30 ENCOUNTER — APPOINTMENT (OUTPATIENT)
Dept: CARDIAC REHAB | Facility: HOSPITAL | Age: 83
End: 2018-08-30

## 2018-08-31 ENCOUNTER — DOCUMENTATION (OUTPATIENT)
Dept: FAMILY MEDICINE CLINIC | Facility: CLINIC | Age: 83
End: 2018-08-31

## 2018-08-31 RX ORDER — ALBUTEROL SULFATE 1.25 MG/3ML
1 SOLUTION RESPIRATORY (INHALATION) EVERY 4 HOURS PRN
Qty: 90 ML | Refills: 12 | Status: SHIPPED | OUTPATIENT
Start: 2018-08-31 | End: 2019-02-06 | Stop reason: SDUPTHER

## 2018-08-31 RX ORDER — ALBUTEROL SULFATE 1.25 MG/3ML
1 SOLUTION RESPIRATORY (INHALATION) EVERY 4 HOURS PRN
Qty: 90 ML | Refills: 12 | Status: SHIPPED | OUTPATIENT
Start: 2018-08-31 | End: 2018-08-31 | Stop reason: SDUPTHER

## 2018-09-04 ENCOUNTER — APPOINTMENT (OUTPATIENT)
Dept: CARDIAC REHAB | Facility: HOSPITAL | Age: 83
End: 2018-09-04

## 2018-09-06 ENCOUNTER — APPOINTMENT (OUTPATIENT)
Dept: CARDIAC REHAB | Facility: HOSPITAL | Age: 83
End: 2018-09-06

## 2018-09-21 ENCOUNTER — APPOINTMENT (OUTPATIENT)
Dept: CT IMAGING | Facility: HOSPITAL | Age: 83
End: 2018-09-21

## 2018-09-21 ENCOUNTER — APPOINTMENT (OUTPATIENT)
Dept: GENERAL RADIOLOGY | Facility: HOSPITAL | Age: 83
End: 2018-09-21

## 2018-09-21 ENCOUNTER — HOSPITAL ENCOUNTER (EMERGENCY)
Facility: HOSPITAL | Age: 83
Discharge: HOME | End: 2018-09-21
Attending: EMERGENCY MEDICINE | Admitting: EMERGENCY MEDICINE

## 2018-09-21 ENCOUNTER — TELEPHONE (OUTPATIENT)
Dept: FAMILY MEDICINE CLINIC | Facility: CLINIC | Age: 83
End: 2018-09-21

## 2018-09-21 VITALS
SYSTOLIC BLOOD PRESSURE: 153 MMHG | HEIGHT: 60 IN | BODY MASS INDEX: 21.68 KG/M2 | TEMPERATURE: 98.1 F | WEIGHT: 110.4 LBS | OXYGEN SATURATION: 96 % | RESPIRATION RATE: 18 BRPM | HEART RATE: 77 BPM | DIASTOLIC BLOOD PRESSURE: 93 MMHG

## 2018-09-21 DIAGNOSIS — R06.00 DYSPNEA, UNSPECIFIED TYPE: ICD-10-CM

## 2018-09-21 DIAGNOSIS — R11.2 NAUSEA AND VOMITING, INTRACTABILITY OF VOMITING NOT SPECIFIED, UNSPECIFIED VOMITING TYPE: Primary | ICD-10-CM

## 2018-09-21 LAB
ALBUMIN SERPL-MCNC: 3.7 G/DL (ref 3.5–5.2)
ALBUMIN/GLOB SERPL: 1.2 G/DL
ALP SERPL-CCNC: 98 U/L (ref 39–117)
ALT SERPL W P-5'-P-CCNC: 7 U/L (ref 1–33)
ANION GAP SERPL CALCULATED.3IONS-SCNC: 6.8 MMOL/L
AST SERPL-CCNC: 21 U/L (ref 1–32)
BASOPHILS # BLD AUTO: 0.02 10*3/MM3 (ref 0–0.2)
BASOPHILS NFR BLD AUTO: 0.3 % (ref 0–1.5)
BILIRUB SERPL-MCNC: 0.2 MG/DL (ref 0.1–1.2)
BUN BLD-MCNC: 20 MG/DL (ref 8–23)
BUN/CREAT SERPL: 21.7 (ref 7–25)
CALCIUM SPEC-SCNC: 8.8 MG/DL (ref 8.2–9.6)
CHLORIDE SERPL-SCNC: 100 MMOL/L (ref 98–107)
CO2 SERPL-SCNC: 34.2 MMOL/L (ref 22–29)
CREAT BLD-MCNC: 0.92 MG/DL (ref 0.57–1)
DEPRECATED RDW RBC AUTO: 63.7 FL (ref 37–54)
EOSINOPHIL # BLD AUTO: 0.01 10*3/MM3 (ref 0–0.7)
EOSINOPHIL NFR BLD AUTO: 0.1 % (ref 0.3–6.2)
ERYTHROCYTE [DISTWIDTH] IN BLOOD BY AUTOMATED COUNT: 14.7 % (ref 11.7–13)
GFR SERPL CREATININE-BSD FRML MDRD: 57 ML/MIN/1.73
GLOBULIN UR ELPH-MCNC: 3 GM/DL
GLUCOSE BLD-MCNC: 93 MG/DL (ref 65–99)
HCT VFR BLD AUTO: 39.6 % (ref 35.6–45.5)
HGB BLD-MCNC: 11.8 G/DL (ref 11.9–15.5)
IMM GRANULOCYTES # BLD: 0 10*3/MM3 (ref 0–0.03)
IMM GRANULOCYTES NFR BLD: 0 % (ref 0–0.5)
INR PPP: 1.82 (ref 0.9–1.1)
LIPASE SERPL-CCNC: 85 U/L (ref 13–60)
LYMPHOCYTES # BLD AUTO: 0.87 10*3/MM3 (ref 0.9–4.8)
LYMPHOCYTES NFR BLD AUTO: 11.8 % (ref 19.6–45.3)
MCH RBC QN AUTO: 35.3 PG (ref 26.9–32)
MCHC RBC AUTO-ENTMCNC: 29.8 G/DL (ref 32.4–36.3)
MCV RBC AUTO: 118.6 FL (ref 80.5–98.2)
MONOCYTES # BLD AUTO: 0.41 10*3/MM3 (ref 0.2–1.2)
MONOCYTES NFR BLD AUTO: 5.6 % (ref 5–12)
NEUTROPHILS # BLD AUTO: 6.04 10*3/MM3 (ref 1.9–8.1)
NEUTROPHILS NFR BLD AUTO: 82.2 % (ref 42.7–76)
NRBC BLD MANUAL-RTO: 0 /100 WBC (ref 0–0)
NT-PROBNP SERPL-MCNC: 1416 PG/ML (ref 0–1800)
PLATELET # BLD AUTO: 261 10*3/MM3 (ref 140–500)
PMV BLD AUTO: 11.5 FL (ref 6–12)
POTASSIUM BLD-SCNC: 4.6 MMOL/L (ref 3.5–5.2)
PROT SERPL-MCNC: 6.7 G/DL (ref 6–8.5)
PROTHROMBIN TIME: 20.7 SECONDS (ref 11.7–14.2)
RBC # BLD AUTO: 3.34 10*6/MM3 (ref 3.9–5.2)
SODIUM BLD-SCNC: 141 MMOL/L (ref 136–145)
TROPONIN T SERPL-MCNC: <0.01 NG/ML (ref 0–0.03)
WBC NRBC COR # BLD: 7.35 10*3/MM3 (ref 4.5–10.7)

## 2018-09-21 PROCEDURE — 93010 ELECTROCARDIOGRAM REPORT: CPT | Performed by: INTERNAL MEDICINE

## 2018-09-21 PROCEDURE — 84484 ASSAY OF TROPONIN QUANT: CPT | Performed by: EMERGENCY MEDICINE

## 2018-09-21 PROCEDURE — 71046 X-RAY EXAM CHEST 2 VIEWS: CPT

## 2018-09-21 PROCEDURE — 93005 ELECTROCARDIOGRAM TRACING: CPT | Performed by: EMERGENCY MEDICINE

## 2018-09-21 PROCEDURE — 25010000002 IOPAMIDOL 61 % SOLUTION: Performed by: EMERGENCY MEDICINE

## 2018-09-21 PROCEDURE — 83690 ASSAY OF LIPASE: CPT | Performed by: EMERGENCY MEDICINE

## 2018-09-21 PROCEDURE — 85025 COMPLETE CBC W/AUTO DIFF WBC: CPT | Performed by: EMERGENCY MEDICINE

## 2018-09-21 PROCEDURE — 99284 EMERGENCY DEPT VISIT MOD MDM: CPT

## 2018-09-21 PROCEDURE — 74177 CT ABD & PELVIS W/CONTRAST: CPT

## 2018-09-21 PROCEDURE — 83880 ASSAY OF NATRIURETIC PEPTIDE: CPT | Performed by: EMERGENCY MEDICINE

## 2018-09-21 PROCEDURE — 85610 PROTHROMBIN TIME: CPT | Performed by: EMERGENCY MEDICINE

## 2018-09-21 PROCEDURE — 80053 COMPREHEN METABOLIC PANEL: CPT | Performed by: EMERGENCY MEDICINE

## 2018-09-21 RX ORDER — ONDANSETRON 4 MG/1
4 TABLET, ORALLY DISINTEGRATING ORAL 4 TIMES DAILY PRN
Qty: 15 TABLET | Refills: 0 | Status: SHIPPED | OUTPATIENT
Start: 2018-09-21 | End: 2018-10-22

## 2018-09-21 RX ADMIN — IOPAMIDOL 85 ML: 612 INJECTION, SOLUTION INTRAVENOUS at 15:01

## 2018-09-21 NOTE — TELEPHONE ENCOUNTER
Notified son per dr peterson she need to go to the ER   She is been throwing up for 2 week now.  Son said ok

## 2018-09-21 NOTE — ED PROVIDER NOTES
" EMERGENCY DEPARTMENT ENCOUNTER    Room Number:  24/24  Date seen:  9/21/2018  Time seen: 12:14 PM  PCP: Adarsh Duckworth MD  Historian: Patient, Family      HPI  Chief Complaint: Vomiting  Context: Jazmine Brown is a 89 y.o. female with h/o CHF; pt uses supplemental O2 at night. Pt reports that she developed intermittent episodes of vomiting about 2-3 weeks ago that resolved about 2-3 days ago. Pt reports that her vomiting typically occurred everyday at about 17:00. Pt states that she has also had intermittent dyspnea and fatigue for the past year. Pt denies diarrhea, abdominal pain, chest pain, documented fever, cough, and congestion. There are no other complaints at this time.       Location: Gastrointestinal   Radiation: N/A  Character: \"vomiting\"  Duration: Onset about 2-3 weeks ago  Severity: Moderate  Progression: Resolved  Aggravating Factors: Nothing  Alleviating Factors: Nothing        PAST MEDICAL HISTORY  Active Ambulatory Problems     Diagnosis Date Noted   • Fatigue 04/01/2016   • Acquired hypothyroidism 04/01/2016   • Paroxysmal a-fib (CMS/HCC) 04/01/2016   • Acute cystitis without hematuria 08/24/2016   • Shortness of breath 07/26/2017   • Chronic combined systolic and diastolic congestive heart failure (CMS/HCC) 10/18/2017   • Acute contact otitis externa of left ear 01/17/2018   • Essential hypertension 08/15/2018   • Chronic fatigue 08/15/2018     Resolved Ambulatory Problems     Diagnosis Date Noted   • No Resolved Ambulatory Problems     Past Medical History:   Diagnosis Date   • Polyp, sigmoid colon    • Thyroid cancer (CMS/HCC)          PAST SURGICAL HISTORY  Past Surgical History:   Procedure Laterality Date   • CARDIAC PACEMAKER PLACEMENT     • HYSTERECTOMY     • TONSILLECTOMY AND ADENOIDECTOMY     • TOTAL THYROIDECTOMY           FAMILY HISTORY  Family History   Problem Relation Age of Onset   • Heart disease Mother    • Hypertension Father         benign essential hypertension "         SOCIAL HISTORY  Social History     Social History   • Marital status:      Spouse name: N/A   • Number of children: 2   • Years of education: N/A     Occupational History   • 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 Negative for chills and fever.   HENT: Negative for congestion, rhinorrhea and sore throat.    Eyes: Negative for pain.   Respiratory: Positive for shortness of breath. Negative for cough.    Cardiovascular: Negative for chest pain and palpitations.   Gastrointestinal: Positive for nausea and vomiting. Negative for abdominal pain and diarrhea.   Genitourinary: Negative for difficulty urinating, dysuria, flank pain and frequency.   Musculoskeletal: Negative for myalgias, neck pain and neck stiffness.   Skin: Negative for rash.   Neurological: Negative for dizziness, speech difficulty, light-headedness, numbness and headaches.   Psychiatric/Behavioral: Negative.    All other systems reviewed and are negative.           PHYSICAL EXAM  ED Triage Vitals   Temp Heart Rate Resp BP SpO2   09/21/18 1037 09/21/18 1037 09/21/18 1125 09/21/18 1125 09/21/18 1037   98.1 °F (36.7 °C) 103 20 121/75 93 %      Temp src Heart Rate Source Patient Position BP Location FiO2 (%)   09/21/18 1037 09/21/18 1037 09/21/18 1125 -- --   Tympanic Monitor Lying         Physical Exam   Constitutional: She is oriented to person, place, and time. No distress.   HENT:   Head: Normocephalic.   Mouth/Throat: Mucous membranes are normal.   Eyes: Pupils are equal, round, and reactive to light. EOM are normal.   Neck: Normal range of motion. Neck supple.    Cardiovascular: Normal rate, regular rhythm and normal heart sounds.    Pulmonary/Chest: Effort normal and breath sounds normal. No respiratory distress. She has no decreased breath sounds. She has no wheezes. She has no rhonchi. She has no rales.   Abdominal: Soft. There is no tenderness. There is no rebound and no guarding.   Musculoskeletal: Normal range of motion.   Neurological: She is alert and oriented to person, place, and time. She has normal sensation.   Skin: Skin is warm and dry.   Psychiatric: Mood and affect normal.   Nursing note and vitals reviewed.          LAB RESULTS  Recent Results (from the past 24 hour(s))   Comprehensive Metabolic Panel    Collection Time: 09/21/18 12:28 PM   Result Value Ref Range    Glucose 93 65 - 99 mg/dL    BUN 20 8 - 23 mg/dL    Creatinine 0.92 0.57 - 1.00 mg/dL    Sodium 141 136 - 145 mmol/L    Potassium 4.6 3.5 - 5.2 mmol/L    Chloride 100 98 - 107 mmol/L    CO2 34.2 (H) 22.0 - 29.0 mmol/L    Calcium 8.8 8.2 - 9.6 mg/dL    Total Protein 6.7 6.0 - 8.5 g/dL    Albumin 3.70 3.50 - 5.20 g/dL    ALT (SGPT) 7 1 - 33 U/L    AST (SGOT) 21 1 - 32 U/L    Alkaline Phosphatase 98 39 - 117 U/L    Total Bilirubin 0.2 0.1 - 1.2 mg/dL    eGFR Non African Amer 57 (L) >60 mL/min/1.73    Globulin 3.0 gm/dL    A/G Ratio 1.2 g/dL    BUN/Creatinine Ratio 21.7 7.0 - 25.0    Anion Gap 6.8 mmol/L   Lipase    Collection Time: 09/21/18 12:28 PM   Result Value Ref Range    Lipase 85 (H) 13 - 60 U/L   Troponin    Collection Time: 09/21/18 12:28 PM   Result Value Ref Range    Troponin T <0.010 0.000 - 0.030 ng/mL   BNP    Collection Time: 09/21/18 12:28 PM   Result Value Ref Range    proBNP 1,416.0 0.0-1,800.0 pg/mL   Protime-INR    Collection Time: 09/21/18 12:28 PM   Result Value Ref Range    Protime 20.7 (H) 11.7 - 14.2 Seconds    INR 1.82 (H) 0.90 - 1.10   CBC Auto Differential    Collection Time: 09/21/18 12:28 PM   Result Value Ref Range    WBC 7.35 4.50 - 10.70 10*3/mm3    RBC 3.34 (L)  3.90 - 5.20 10*6/mm3    Hemoglobin 11.8 (L) 11.9 - 15.5 g/dL    Hematocrit 39.6 35.6 - 45.5 %    .6 (H) 80.5 - 98.2 fL    MCH 35.3 (H) 26.9 - 32.0 pg    MCHC 29.8 (L) 32.4 - 36.3 g/dL    RDW 14.7 (H) 11.7 - 13.0 %    RDW-SD 63.7 (H) 37.0 - 54.0 fl    MPV 11.5 6.0 - 12.0 fL    Platelets 261 140 - 500 10*3/mm3    Neutrophil % 82.2 (H) 42.7 - 76.0 %    Lymphocyte % 11.8 (L) 19.6 - 45.3 %    Monocyte % 5.6 5.0 - 12.0 %    Eosinophil % 0.1 (L) 0.3 - 6.2 %    Basophil % 0.3 0.0 - 1.5 %    Immature Grans % 0.0 0.0 - 0.5 %    Neutrophils, Absolute 6.04 1.90 - 8.10 10*3/mm3    Lymphocytes, Absolute 0.87 (L) 0.90 - 4.80 10*3/mm3    Monocytes, Absolute 0.41 0.20 - 1.20 10*3/mm3    Eosinophils, Absolute 0.01 0.00 - 0.70 10*3/mm3    Basophils, Absolute 0.02 0.00 - 0.20 10*3/mm3    Immature Grans, Absolute 0.00 0.00 - 0.03 10*3/mm3    nRBC 0.0 0.0 - 0.0 /100 WBC       Ordered the above labs and reviewed the results.        RADIOLOGY  CT Abdomen Pelvis With Contrast   Final Result   Cardiomegaly. Marked dextroscoliosis in the lumbar spine. No   acute process is identified in the abdomen or pelvis.       This report was finalized on 9/21/2018 3:25 PM by Dr. Alfredo Lott M.D.          XR Chest 2 View   Preliminary Result   Cardiomegaly and increased elevation of the right   hemidiaphragm as compared to 03/01/2017.                 Ordered the above noted radiological studies. Reviewed by me in PACS.  Spoke with Dr. Lott (radiologist) regarding CT Abd scan results.          PROCEDURES  Procedures        EKG:           EKG time: 12:52 PM  Rhythm/Rate: Paced rhythm, rate 75      Interpreted Contemporaneously by me, independently viewed  Similar compared to prior 11/18/14          MEDICATIONS GIVEN IN ER  Medications   iopamidol (ISOVUE-300) 61 % injection 100 mL (85 mL Intravenous Given 9/21/18 1501)             PROGRESS AND CONSULTS  ED Course as of Sep 21 1648   Fri Sep 21, 2018   1532 3:32 PM  Patient with nausea and  vomiting.  Seems to be improved.  CT and labs are normal.  Ambulates without difficulty.  Abdomen remains soft and nontender.  Will discharge home.  Zofran, GI referral.    [SL]      ED Course User Index  [SL] James Boudreaux MD       12:23 PM:  Blood work, CXR, and EKG ordered for further evaluation.    1:03 PM:  CT Abd ordered for further evaluation.     3:25 PM:  Rechecked pt. Pt is resting comfortably and appears in no acute distress. Informed pt and family that pt's WBC is WNL. Troponin is negative. CT Abd and CXR are negative acute. Pt was advised to f/u with PMD. Pt will be provided with f/u referral to the GI and will be prescribed with rx for antiemetic. Strict RTER warnings given. Pt and family agree with plan for discharge.             MEDICAL DECISION MAKING      MDM  Number of Diagnoses or Management Options     Amount and/or Complexity of Data Reviewed  Clinical lab tests: reviewed and ordered (Troponin is negative. WBC is 7.35.)  Tests in the radiology section of CPT®: ordered and reviewed (CT Abd:  Cardiomegaly. Marked dextroscoliosis in the lumbar spine. No  acute process is identified in the abdomen or pelvis.)  Tests in the medicine section of CPT®: ordered and reviewed (EKG interpreted.   )  Discussion of test results with the performing providers: yes (CT Abd results d/w radiologist.   )    Patient Progress  Patient progress: stable             DIAGNOSIS  Final diagnoses:   Nausea and vomiting, intractability of vomiting not specified, unspecified vomiting type   Dyspnea, unspecified type         DISPOSITION  Pt discharged.      DISCHARGE    Patient discharged in stable condition.    Reviewed implications of results, diagnosis, meds, responsibility to follow up, warning signs and symptoms of possible worsening, potential complications and reasons to return to ER.    Patient/Family voiced understanding of above instructions.    Discussed plan for discharge, as there is no emergent indication  for admission. Pt/family is agreeable and understands need for follow up and repeat testing. Pt is aware that discharge does not mean that nothing is wrong but it indicates no emergency is present that requires admission and they must continue care with follow-up as given below or physician of their choice.     FOLLOW-UP  Adarsh Duckworth MD  [address]  [facility] [address]  [facility] [id number] [phone]    Schedule an appointment as soon as possible for a visit       Gerhard Lam MD  [address]  [facility] [id number] [phone]    Schedule an appointment as soon as possible for a visit            Medication List      New Prescriptions    ondansetron ODT 4 MG disintegrating tablet  Commonly known as:  ZOFRAN-ODT  Take 1 tablet by mouth 4 (Four) Times a Day As Needed for Nausea or   Vomiting.              Latest Documented Vital Signs:  As of 4:14 PM  BP- 153/93 HR- 77 Temp- 98.1 °F (36.7 °C) (Tympanic) O2 sat- 96%        --  Documentation assistance provided by balaji Ta for Dr. Janusz MD.  Information recorded by the scribe was done at my direction and has been verified and validated by me.             Stiven Ta  09/21/18 7649       James Boudreaux MD  09/21/18 4949

## 2018-09-24 ENCOUNTER — CLINICAL SUPPORT (OUTPATIENT)
Dept: FAMILY MEDICINE CLINIC | Facility: CLINIC | Age: 83
End: 2018-09-24

## 2018-09-24 DIAGNOSIS — Z79.01 LONG-TERM (CURRENT) USE OF ANTICOAGULANTS: ICD-10-CM

## 2018-09-24 DIAGNOSIS — Z51.81 ENCOUNTER FOR THERAPEUTIC DRUG MONITORING: Primary | ICD-10-CM

## 2018-09-24 LAB — INR PPP: 1.3 (ref 0.9–1.1)

## 2018-09-24 PROCEDURE — 85610 PROTHROMBIN TIME: CPT | Performed by: FAMILY MEDICINE

## 2018-09-24 PROCEDURE — 36416 COLLJ CAPILLARY BLOOD SPEC: CPT | Performed by: FAMILY MEDICINE

## 2018-09-27 ENCOUNTER — PATIENT OUTREACH (OUTPATIENT)
Dept: CASE MANAGEMENT | Facility: OTHER | Age: 83
End: 2018-09-27

## 2018-09-27 NOTE — OUTREACH NOTE
"Care Management Plan 9/27/2018   Lifestyle Goals Routine follow-up with doctor(s);Increase physical activity;Other (See Comment)   Lifestyle Goals decreased nausea and vomiting   Barriers Disease education;Pain   Self Management Medication Adherence;Use oxygen   Annual Wellness Visit:  Patient Has Completed   Care Gaps Addressed Flu Shot;Pneumonia Vaccine   Specific Disease Process Teaching Heart Disease   Does patient have depression diagnosis? No   Ed Visits past 12 months: 1   Medication Adherence Medications understood   Goal Progress Making Progress Toward Goal(s)   Readiness Scale 8   Confidence Scale 7   Health Literacy Good     The main concerns and/or symptoms the patient would like to address are: Patient reports she has not had any further nausea or vomiting since her ER visit. She feels like she is trying to eat more often with meals being smaller and this has helped her. Reviewed current medication list with patient. She has good understating of medication use. She was seen by Cardiology yesterday and received a \"good report\" she weighs herself weekly and knows to report weight gains to MD. She has not made a f/u appointment with PCP but will do this 10/1/18. Health Maintenance Gaps reviewed, she has had her flu shot.  Education/instruction provided by Care Coordinator: Explained role of Care Advisor and contact information given to patient.Nurse provided patient education.No other questions or concerns voiced at this time.    Follow Up Outreach Due: next week    Elena Jane RN    "

## 2018-10-03 ENCOUNTER — PATIENT OUTREACH (OUTPATIENT)
Dept: CASE MANAGEMENT | Facility: OTHER | Age: 83
End: 2018-10-03

## 2018-10-03 ENCOUNTER — TELEPHONE (OUTPATIENT)
Dept: FAMILY MEDICINE CLINIC | Facility: CLINIC | Age: 83
End: 2018-10-03

## 2018-10-03 NOTE — TELEPHONE ENCOUNTER
Pt son said when they was here getting an INR done that the patient was having dizzy spells. He said that it was likely vertigo and would like a referral to [facility] rehab for this at [place]. Please advise.

## 2018-10-03 NOTE — OUTREACH NOTE
Pt. Reports she has not had any further nausea or vomiting, she plans to have her blood drawn today and ask if she can get her flu shot. Reviewed continued use of 6 small meals daily. Nurse provided patient education.No other questions or concerns voiced at this time.

## 2018-10-08 ENCOUNTER — TELEPHONE (OUTPATIENT)
Dept: FAMILY MEDICINE CLINIC | Facility: CLINIC | Age: 83
End: 2018-10-08

## 2018-10-08 NOTE — TELEPHONE ENCOUNTER
I cannot safely make a diagnosis and referral for something like that without the patient being seen.

## 2018-10-08 NOTE — TELEPHONE ENCOUNTER
[place] bradford pt     Pt son said when they was here getting an INR done that the patient was having dizzy spells. He said that it was likely vertigo and would like a referral to [facility] rehab for this at [place]. Please advise.

## 2018-10-14 ENCOUNTER — APPOINTMENT (OUTPATIENT)
Dept: GENERAL RADIOLOGY | Facility: HOSPITAL | Age: 83
End: 2018-10-14

## 2018-10-14 PROCEDURE — 71101 X-RAY EXAM UNILAT RIBS/CHEST: CPT | Performed by: FAMILY MEDICINE

## 2018-10-22 ENCOUNTER — APPOINTMENT (OUTPATIENT)
Dept: GENERAL RADIOLOGY | Facility: HOSPITAL | Age: 83
End: 2018-10-22

## 2018-10-22 ENCOUNTER — APPOINTMENT (OUTPATIENT)
Dept: CT IMAGING | Facility: HOSPITAL | Age: 83
End: 2018-10-22

## 2018-10-22 ENCOUNTER — HOSPITAL ENCOUNTER (EMERGENCY)
Facility: HOSPITAL | Age: 83
Discharge: HOME | End: 2018-10-22
Attending: EMERGENCY MEDICINE | Admitting: EMERGENCY MEDICINE

## 2018-10-22 VITALS
RESPIRATION RATE: 18 BRPM | HEART RATE: 82 BPM | SYSTOLIC BLOOD PRESSURE: 131 MMHG | OXYGEN SATURATION: 90 % | HEIGHT: 56 IN | BODY MASS INDEX: 28.12 KG/M2 | WEIGHT: 125 LBS | TEMPERATURE: 98.2 F | DIASTOLIC BLOOD PRESSURE: 73 MMHG

## 2018-10-22 DIAGNOSIS — S70.02XA CONTUSION OF LEFT HIP, INITIAL ENCOUNTER: Primary | ICD-10-CM

## 2018-10-22 DIAGNOSIS — R11.2 NAUSEA AND VOMITING, INTRACTABILITY OF VOMITING NOT SPECIFIED, UNSPECIFIED VOMITING TYPE: ICD-10-CM

## 2018-10-22 DIAGNOSIS — S20.212A CONTUSION OF LEFT CHEST WALL, INITIAL ENCOUNTER: ICD-10-CM

## 2018-10-22 LAB
ALBUMIN SERPL-MCNC: 4 G/DL (ref 3.5–5.2)
ALBUMIN/GLOB SERPL: 1.3 G/DL
ALP SERPL-CCNC: 62 U/L (ref 39–117)
ALT SERPL W P-5'-P-CCNC: 6 U/L (ref 1–33)
ANION GAP SERPL CALCULATED.3IONS-SCNC: 11.5 MMOL/L
ANISOCYTOSIS BLD QL: ABNORMAL
AST SERPL-CCNC: 21 U/L (ref 1–32)
BILIRUB SERPL-MCNC: 0.4 MG/DL (ref 0.1–1.2)
BILIRUB UR QL STRIP: NEGATIVE
BUN BLD-MCNC: 19 MG/DL (ref 8–23)
BUN/CREAT SERPL: 18.1 (ref 7–25)
CALCIUM SPEC-SCNC: 9.1 MG/DL (ref 8.2–9.6)
CHLORIDE SERPL-SCNC: 102 MMOL/L (ref 98–107)
CLARITY UR: CLEAR
CO2 SERPL-SCNC: 27.5 MMOL/L (ref 22–29)
COLOR UR: YELLOW
CREAT BLD-MCNC: 1.05 MG/DL (ref 0.57–1)
DEPRECATED RDW RBC AUTO: 64.7 FL (ref 37–54)
ERYTHROCYTE [DISTWIDTH] IN BLOOD BY AUTOMATED COUNT: 15.1 % (ref 11.7–13)
GFR SERPL CREATININE-BSD FRML MDRD: 49 ML/MIN/1.73
GLOBULIN UR ELPH-MCNC: 3.1 GM/DL
GLUCOSE BLD-MCNC: 121 MG/DL (ref 65–99)
GLUCOSE UR STRIP-MCNC: NEGATIVE MG/DL
HCT VFR BLD AUTO: 40.3 % (ref 35.6–45.5)
HGB BLD-MCNC: 12.6 G/DL (ref 11.9–15.5)
HGB UR QL STRIP.AUTO: NEGATIVE
HOLD SPECIMEN: NORMAL
HOLD SPECIMEN: NORMAL
INR PPP: 2.16 (ref 0.9–1.1)
KETONES UR QL STRIP: NEGATIVE
LEUKOCYTE ESTERASE UR QL STRIP.AUTO: NEGATIVE
LIPASE SERPL-CCNC: 53 U/L (ref 13–60)
LYMPHOCYTES # BLD MANUAL: 0.73 10*3/MM3 (ref 0.9–4.8)
LYMPHOCYTES NFR BLD MANUAL: 4 % (ref 5–12)
LYMPHOCYTES NFR BLD MANUAL: 9 % (ref 19.6–45.3)
MCH RBC QN AUTO: 36.7 PG (ref 26.9–32)
MCHC RBC AUTO-ENTMCNC: 31.3 G/DL (ref 32.4–36.3)
MCV RBC AUTO: 117.5 FL (ref 80.5–98.2)
MONOCYTES # BLD AUTO: 0.32 10*3/MM3 (ref 0.2–1.2)
NEUTROPHILS # BLD AUTO: 7.02 10*3/MM3 (ref 1.9–8.1)
NEUTROPHILS NFR BLD MANUAL: 87 % (ref 42.7–76)
NITRITE UR QL STRIP: NEGATIVE
OVALOCYTES BLD QL SMEAR: ABNORMAL
PH UR STRIP.AUTO: <=5 [PH] (ref 5–8)
PLAT MORPH BLD: NORMAL
PLATELET # BLD AUTO: 287 10*3/MM3 (ref 140–500)
PMV BLD AUTO: 11.5 FL (ref 6–12)
POTASSIUM BLD-SCNC: 4.5 MMOL/L (ref 3.5–5.2)
PROT SERPL-MCNC: 7.1 G/DL (ref 6–8.5)
PROT UR QL STRIP: NEGATIVE
PROTHROMBIN TIME: 23.7 SECONDS (ref 11.7–14.2)
RBC # BLD AUTO: 3.43 10*6/MM3 (ref 3.9–5.2)
SCAN SLIDE: NORMAL
SODIUM BLD-SCNC: 141 MMOL/L (ref 136–145)
SP GR UR STRIP: 1.01 (ref 1–1.03)
STOMATOCYTES BLD QL SMEAR: ABNORMAL
TROPONIN T SERPL-MCNC: <0.01 NG/ML (ref 0–0.03)
UROBILINOGEN UR QL STRIP: NORMAL
VIT B12 BLD-MCNC: 574 PG/ML (ref 211–946)
WBC MORPH BLD: NORMAL
WBC NRBC COR # BLD: 8.07 10*3/MM3 (ref 4.5–10.7)
WHOLE BLOOD HOLD SPECIMEN: NORMAL
WHOLE BLOOD HOLD SPECIMEN: NORMAL

## 2018-10-22 PROCEDURE — 84484 ASSAY OF TROPONIN QUANT: CPT | Performed by: EMERGENCY MEDICINE

## 2018-10-22 PROCEDURE — 81003 URINALYSIS AUTO W/O SCOPE: CPT | Performed by: PHYSICIAN ASSISTANT

## 2018-10-22 PROCEDURE — 74177 CT ABD & PELVIS W/CONTRAST: CPT

## 2018-10-22 PROCEDURE — 85007 BL SMEAR W/DIFF WBC COUNT: CPT | Performed by: PHYSICIAN ASSISTANT

## 2018-10-22 PROCEDURE — 73502 X-RAY EXAM HIP UNI 2-3 VIEWS: CPT

## 2018-10-22 PROCEDURE — 96361 HYDRATE IV INFUSION ADD-ON: CPT

## 2018-10-22 PROCEDURE — 96374 THER/PROPH/DIAG INJ IV PUSH: CPT

## 2018-10-22 PROCEDURE — 93005 ELECTROCARDIOGRAM TRACING: CPT | Performed by: EMERGENCY MEDICINE

## 2018-10-22 PROCEDURE — 99284 EMERGENCY DEPT VISIT MOD MDM: CPT

## 2018-10-22 PROCEDURE — 25010000002 IOPAMIDOL 61 % SOLUTION: Performed by: EMERGENCY MEDICINE

## 2018-10-22 PROCEDURE — 83690 ASSAY OF LIPASE: CPT | Performed by: PHYSICIAN ASSISTANT

## 2018-10-22 PROCEDURE — 80053 COMPREHEN METABOLIC PANEL: CPT | Performed by: PHYSICIAN ASSISTANT

## 2018-10-22 PROCEDURE — 85610 PROTHROMBIN TIME: CPT | Performed by: PHYSICIAN ASSISTANT

## 2018-10-22 PROCEDURE — 82607 VITAMIN B-12: CPT | Performed by: EMERGENCY MEDICINE

## 2018-10-22 PROCEDURE — 25010000002 ONDANSETRON PER 1 MG: Performed by: EMERGENCY MEDICINE

## 2018-10-22 PROCEDURE — 96375 TX/PRO/DX INJ NEW DRUG ADDON: CPT

## 2018-10-22 PROCEDURE — 85025 COMPLETE CBC W/AUTO DIFF WBC: CPT | Performed by: PHYSICIAN ASSISTANT

## 2018-10-22 PROCEDURE — 25010000002 MORPHINE PER 10 MG: Performed by: EMERGENCY MEDICINE

## 2018-10-22 PROCEDURE — 93010 ELECTROCARDIOGRAM REPORT: CPT | Performed by: INTERNAL MEDICINE

## 2018-10-22 PROCEDURE — 71046 X-RAY EXAM CHEST 2 VIEWS: CPT

## 2018-10-22 RX ORDER — ONDANSETRON 2 MG/ML
4 INJECTION INTRAMUSCULAR; INTRAVENOUS ONCE
Status: COMPLETED | OUTPATIENT
Start: 2018-10-22 | End: 2018-10-22

## 2018-10-22 RX ORDER — TRAMADOL HYDROCHLORIDE 50 MG/1
50 TABLET ORAL EVERY 12 HOURS PRN
Qty: 12 TABLET | Refills: 0 | Status: SHIPPED | OUTPATIENT
Start: 2018-10-22 | End: 2019-03-06

## 2018-10-22 RX ORDER — ONDANSETRON 2 MG/ML
4 INJECTION INTRAMUSCULAR; INTRAVENOUS ONCE
Status: DISCONTINUED | OUTPATIENT
Start: 2018-10-22 | End: 2018-10-22

## 2018-10-22 RX ORDER — MORPHINE SULFATE 2 MG/ML
2 INJECTION, SOLUTION INTRAMUSCULAR; INTRAVENOUS ONCE
Status: COMPLETED | OUTPATIENT
Start: 2018-10-22 | End: 2018-10-22

## 2018-10-22 RX ORDER — TRAMADOL HYDROCHLORIDE 50 MG/1
50 TABLET ORAL ONCE
Status: COMPLETED | OUTPATIENT
Start: 2018-10-22 | End: 2018-10-22

## 2018-10-22 RX ORDER — ONDANSETRON 4 MG/1
4 TABLET, ORALLY DISINTEGRATING ORAL 4 TIMES DAILY PRN
Qty: 15 TABLET | Refills: 0 | Status: SHIPPED | OUTPATIENT
Start: 2018-10-22 | End: 2019-01-01

## 2018-10-22 RX ADMIN — SODIUM CHLORIDE 500 ML: 9 INJECTION, SOLUTION INTRAVENOUS at 16:49

## 2018-10-22 RX ADMIN — MORPHINE SULFATE 2 MG: 2 INJECTION, SOLUTION INTRAMUSCULAR; INTRAVENOUS at 16:52

## 2018-10-22 RX ADMIN — IOPAMIDOL 85 ML: 612 INJECTION, SOLUTION INTRAVENOUS at 17:12

## 2018-10-22 RX ADMIN — ONDANSETRON 4 MG: 2 INJECTION INTRAMUSCULAR; INTRAVENOUS at 16:50

## 2018-10-22 RX ADMIN — TRAMADOL HYDROCHLORIDE 50 MG: 50 TABLET, FILM COATED ORAL at 18:44

## 2018-10-22 NOTE — ED NOTES
Pt c/o left hip pain s/p fall x3 days ago. Pt also reports nausea due to pain in left hip. Pt family states pt is not eating and drinking normally after fall.      Melba Frazier RN  10/22/18 5815

## 2018-10-22 NOTE — ED PROVIDER NOTES
" EMERGENCY DEPARTMENT ENCOUNTER    Room Number:  30/30  PCP: Adarsh Duckworth MD  Historian: Patient, Family      HPI  Chief Complaint: Fall, Vomiting  Context: Jazmine Brown is a 89 y.o. female. Pt has h/o CHF for which pt uses supplemental O2 at night. Pt reports that about 1.5 weeks ago, pt tripped and fell onto a couch, injuring her left hip. Pt reports that since the fall, pt has had intermittent episodes of left hip pain radiating to the lower back and LLE. Since yesterday AM, pt has had nausea and vomiting. Pt reports that her vomiting worsens with attempting to eat. Pt has also had decreased appetite/PO intake. Pt denies sustaining any other injuries during the fall (including head injury), headache, LOC, abdominal pain, chest pain, new/worsening dyspnea, neck pain, diarrhea, documented fever, and dysuria. There are no other complaints at this time.     FALL  Pain Location: Left hip  Radiation: Lower back, LLE  Character: \"aching\"  Duration: Injury occurred about 1.5 weeks ago  Severity: Moderate  Progression: Waxing and waning  Aggravating Factors: Nothing  Alleviating Factors: Nothing      VOMITING  Location: Gastrointestinal  Radiation: N/A  Character: \"vomiting\"  Duration: Onset yesterday  Severity: Moderate  Progression: Waxing and waning  Aggravating Factors: Attempting to eat  Alleviating Factors: Nothing          MEDICAL RECORD REVIEW    Pt had a [facility] that showed:   1. No active disease of the chest.  2. Satisfactory left rib detail.    Pt was seen in the ER on 09/21/18 secondary to nausea and vomiting. Pt's CT Abd was negative acute at that time. WBC was 7.35 and MCV was 118.6.         PAST MEDICAL HISTORY  Active Ambulatory Problems     Diagnosis Date Noted   • Fatigue 04/01/2016   • Acquired hypothyroidism 04/01/2016   • Paroxysmal A-fib (CMS/HCC) 04/01/2016   • Acute cystitis without hematuria 08/24/2016   • Shortness of breath " 07/26/2017   • Chronic combined systolic and diastolic congestive heart failure (CMS/HCC) 10/18/2017   • Acute contact otitis externa of left ear 01/17/2018   • Essential hypertension 08/15/2018   • Chronic fatigue 08/15/2018     Resolved Ambulatory Problems     Diagnosis Date Noted   • No Resolved Ambulatory Problems     Past Medical History:   Diagnosis Date   • Polyp, sigmoid colon    • Thyroid cancer (CMS/HCC)          PAST SURGICAL HISTORY  Past Surgical History:   Procedure Laterality Date   • CARDIAC PACEMAKER PLACEMENT     • HYSTERECTOMY     • TONSILLECTOMY AND ADENOIDECTOMY     • TOTAL THYROIDECTOMY           FAMILY HISTORY  Family History   Problem Relation Age of Onset   • Heart disease Mother    • Hypertension Father         benign essential hypertension         SOCIAL HISTORY  Social History     Social History   • Marital status:      Spouse name: N/A   • Number of children: 2   • Years of education: N/A     Occupational History   • 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 Negative for chills and fever.   HENT: Negative for congestion, rhinorrhea and sore throat.    Eyes: Negative for pain.   Respiratory: Negative for cough and shortness of breath.    Cardiovascular: Negative for chest pain, palpitations and leg swelling.   Gastrointestinal: Positive for nausea and vomiting. Negative for abdominal pain and diarrhea.   Genitourinary: Negative for difficulty urinating, dysuria, flank pain and frequency.   Musculoskeletal: Negative for neck pain and neck stiffness.        Left hip pain  radiating to the lower back and LLE   Skin: Negative for rash.   Neurological: Negative for dizziness, syncope, speech difficulty, weakness, light-headedness, numbness and headaches.   Psychiatric/Behavioral: Negative.    All other systems reviewed and are negative.           PHYSICAL EXAM  ED Triage Vitals   Temp Heart Rate Resp BP SpO2   10/22/18 1508 10/22/18 1508 10/22/18 1508 10/22/18 1531 10/22/18 1508   98.2 °F (36.8 °C) 82 18 152/98 99 %      Temp src Heart Rate Source Patient Position BP Location FiO2 (%)   -- -- -- -- --              Physical Exam   Constitutional: She is oriented to person, place, and time. No distress.   HENT:   Head: Normocephalic and atraumatic.   Mouth/Throat: Mucous membranes are normal.   Eyes: Pupils are equal, round, and reactive to light. EOM are normal.   Neck: Normal range of motion. Neck supple.   Cardiovascular: Normal rate and normal heart sounds.  An irregular rhythm present.   Pulmonary/Chest: Effort normal. No respiratory distress. She has no decreased breath sounds. She has no wheezes. She has no rhonchi. She has rales in the right lower field. She exhibits no tenderness.   Abdominal: Soft. Bowel sounds are hypoactive. There is no tenderness. There is no rebound and no guarding.   Musculoskeletal: Normal range of motion. She exhibits tenderness (of the left SI joint and lateral left hip ).   Normal ROM of the LLE. No C-Spine, T-Spine, or L-Spine tenderness.   Neurological: She is alert and oriented to person, place, and time. She has normal sensation.   Skin: Skin is warm and dry.   Decreased skin turgor   Psychiatric: Mood and affect normal.   Nursing note and vitals reviewed.          LAB RESULTS  Recent Results (from the past 24 hour(s))   Comprehensive Metabolic Panel    Collection Time: 10/22/18  3:55 PM   Result Value Ref Range    Glucose 121 (H) 65 - 99 mg/dL    BUN 19 8 - 23 mg/dL    Creatinine 1.05 (H) 0.57 - 1.00 mg/dL    Sodium 141 136 - 145 mmol/L     Potassium 4.5 3.5 - 5.2 mmol/L    Chloride 102 98 - 107 mmol/L    CO2 27.5 22.0 - 29.0 mmol/L    Calcium 9.1 8.2 - 9.6 mg/dL    Total Protein 7.1 6.0 - 8.5 g/dL    Albumin 4.00 3.50 - 5.20 g/dL    ALT (SGPT) 6 1 - 33 U/L    AST (SGOT) 21 1 - 32 U/L    Alkaline Phosphatase 62 39 - 117 U/L    Total Bilirubin 0.4 0.1 - 1.2 mg/dL    eGFR Non African Amer 49 (L) >60 mL/min/1.73    Globulin 3.1 gm/dL    A/G Ratio 1.3 g/dL    BUN/Creatinine Ratio 18.1 7.0 - 25.0    Anion Gap 11.5 mmol/L   Lipase    Collection Time: 10/22/18  3:55 PM   Result Value Ref Range    Lipase 53 13 - 60 U/L   Protime-INR    Collection Time: 10/22/18  3:55 PM   Result Value Ref Range    Protime 23.7 (H) 11.7 - 14.2 Seconds    INR 2.16 (H) 0.90 - 1.10   CBC Auto Differential    Collection Time: 10/22/18  3:55 PM   Result Value Ref Range    WBC 8.07 4.50 - 10.70 10*3/mm3    RBC 3.43 (L) 3.90 - 5.20 10*6/mm3    Hemoglobin 12.6 11.9 - 15.5 g/dL    Hematocrit 40.3 35.6 - 45.5 %    .5 (H) 80.5 - 98.2 fL    MCH 36.7 (H) 26.9 - 32.0 pg    MCHC 31.3 (L) 32.4 - 36.3 g/dL    RDW 15.1 (H) 11.7 - 13.0 %    RDW-SD 64.7 (H) 37.0 - 54.0 fl    MPV 11.5 6.0 - 12.0 fL    Platelets 287 140 - 500 10*3/mm3   Light Blue Top    Collection Time: 10/22/18  3:55 PM   Result Value Ref Range    Extra Tube hold for add-on    Green Top (Gel)    Collection Time: 10/22/18  3:55 PM   Result Value Ref Range    Extra Tube Hold for add-ons.    Lavender Top    Collection Time: 10/22/18  3:55 PM   Result Value Ref Range    Extra Tube hold for add-on    Gold Top - SST    Collection Time: 10/22/18  3:55 PM   Result Value Ref Range    Extra Tube Hold for add-ons.    Scan Slide    Collection Time: 10/22/18  3:55 PM   Result Value Ref Range    Scan Slide     Manual Differential    Collection Time: 10/22/18  3:55 PM   Result Value Ref Range    Neutrophil % 87.0 (H) 42.7 - 76.0 %    Lymphocyte % 9.0 (L) 19.6 - 45.3 %    Monocyte % 4.0 (L) 5.0 - 12.0 %    Neutrophils Absolute 7.02 1.90 -  8.10 10*3/mm3    Lymphocytes Absolute 0.73 (L) 0.90 - 4.80 10*3/mm3    Monocytes Absolute 0.32 0.20 - 1.20 10*3/mm3    Anisocytosis Mod/2+ None Seen    Ovalocytes Mod/2+ None Seen    Stomatocytes Large/3+ None Seen    WBC Morphology Normal Normal    Platelet Morphology Normal Normal   Troponin    Collection Time: 10/22/18  3:55 PM   Result Value Ref Range    Troponin T <0.010 0.000 - 0.030 ng/mL   Vitamin B12    Collection Time: 10/22/18  3:55 PM   Result Value Ref Range    Vitamin B-12 574 211 - 946 pg/mL   Urinalysis With Microscopic If Indicated (No Culture) - Urine, Clean Catch    Collection Time: 10/22/18  4:42 PM   Result Value Ref Range    Color, UA Yellow Yellow, Straw    Appearance, UA Clear Clear    pH, UA <=5.0 5.0 - 8.0    Specific Gravity, UA 1.014 1.005 - 1.030    Glucose, UA Negative Negative    Ketones, UA Negative Negative    Bilirubin, UA Negative Negative    Blood, UA Negative Negative    Protein, UA Negative Negative    Leuk Esterase, UA Negative Negative    Nitrite, UA Negative Negative    Urobilinogen, UA 0.2 E.U./dL 0.2 - 1.0 E.U./dL       Ordered the above labs and reviewed the results.        RADIOLOGY  XR Chest 2 View   Final Result   No focal pulmonary consolidation. Cardiomegaly. Tortuous   aorta. Follow-up as clinically indicated.       This report was finalized on 10/22/2018 6:13 PM by Dr. Jaret Watson M.D.          XR Hip With or Without Pelvis 2 - 3 View Left   Final Result       No acute fracture is identified. Follow-up/further evaluation can be   obtained as indicated.               This report was finalized on 10/22/2018 6:10 PM by Dr. Jaret Watson M.D.          CT Abdomen Pelvis With Contrast   Final Result   Mild sigmoid diverticulosis without evidence of   diverticulitis. Moderate osteoarthritis within the hip joints. Otherwise   unremarkable CT scan of the abdomen and pelvis. No acute process is   identified.       This report was finalized on 10/22/2018 5:56  PM by Dr. Alfredo Lott M.D.                 Ordered the above noted radiological studies. Reviewed by me in PACS.  Spoke with Dr. Lott (radiologist) regarding CT abd/pel scan results. Dr. Lott has reviewed bone window and sees no fracture of L hip, pelvis, or sacrum.    PROCEDURES  Procedures        EKG    EKG time: 1648  Rhythm/Rate: Paced rhythm rate 75  No Acute Ischemia  Non-Specific ST-T changes    Unchanged compared to prior on September 21st, 2018    Interpreted Contemporaneously by me.  Independently viewed by me.        MEDICATIONS GIVEN IN ER  Medications   traMADol (ULTRAM) tablet 50 mg (not administered)   sodium chloride 0.9 % bolus 500 mL (0 mL Intravenous Stopped 10/22/18 1826)   morphine injection 2 mg (2 mg Intravenous Given 10/22/18 1652)   ondansetron (ZOFRAN) injection 4 mg (4 mg Intravenous Given 10/22/18 1650)   iopamidol (ISOVUE-300) 61 % injection 100 mL (85 mL Intravenous Given 10/22/18 1712)             PROGRESS AND CONSULTS  ED Course as of Oct 22 1840   Mon Oct 22, 2018   1518 Fall 1 week ago, left hip pain. Nausea and vomiting with abdominal pain x today.  [KA]      ED Course User Index  [KA] Bruna Merritt PA   4:31 PM:  Ordered EKG and troponin for further evaluation. Ordered zofran for nausea, morphine for pain, and IVF for hydration.    4:37 PM:  Left hip X-Ray and Vitamin B12 ordered for further evaluation.    6:25 PM:  Pt is requested food and drink. Pt will be given chips and water to see if she can tolerate.    6:30 PM:  Rechecked with pt, who is resting comfortably, in no distress, and is tolerating food and water. I discussed lab and imaging results with pt. Pt understands there is no fractures causing her L hip pain. Plan to discharge pt with zofran and tramadol. Pt should f/u with PCP. Pt understands and agrees with the plan, all questions answered.    6:38 PM:  Ordered ultram for pain.    MEDICAL DECISION MAKING      MDM  Number of Diagnoses or Management  Options     Amount and/or Complexity of Data Reviewed  Clinical lab tests: ordered and reviewed (UA results reviewed. Troponin= <0.010, glucose= 121, creatinine= 1.05, INR= 2.16, WBC= 8.07)  Tests in the radiology section of CPT®: reviewed and ordered (CT abd/pel shows mild sigmoid diverticulosis without evidence of diverticulitis. Moderate osteoarthritis within the hip joints. XR L hip shows no acute fractures.)  Tests in the medicine section of CPT®: ordered and reviewed (EKG interpreted. )  Decide to obtain previous medical records or to obtain history from someone other than the patient: yes    Patient Progress  Patient progress: stable             DIAGNOSIS  Final diagnoses:   Contusion of left hip, initial encounter   Nausea and vomiting, intractability of vomiting not specified, unspecified vomiting type         DISPOSITION  DISCHARGE    Patient discharged in stable condition.    Reviewed implications of results, diagnosis, meds, responsibility to follow up, warning signs and symptoms of possible worsening, potential complications and reasons to return to ER, including new or worsening sxs.    Patient/Family voiced understanding of above instructions.    Discussed plan for discharge, as there is no emergent indication for admission. Patient referred to primary care provider for BP management due to today's BP. Pt/family is agreeable and understands need for follow up and repeat testing.  Pt is aware that discharge does not mean that nothing is wrong but it indicates no emergency is present that requires admission and they must continue care with follow-up as given below or physician of their choice.     FOLLOW-UP  Adarsh Duckworth MD  [address]  [place] [address]  [phone]    In 1 week  For recheck         Medication List      Changed    traMADol 50 MG tablet  Commonly known as:  ULTRAM  Take 1 tablet by mouth Every 12 (Twelve) Hours As Needed for Severe Pain    (try 1/2 1st and use  very sparingly).  What changed:  when to take this            Latest Documented Vital Signs:  As of 6:40 PM  BP- 131/73 HR- 82 Temp- 98.2 °F (36.8 °C) O2 sat- 90%        --  Documentation assistance provided by balaji Ta and Emili Miranda for Dr. Leyla MD.  Information recorded by the scribe was done at my direction and has been verified and validated by me.          Stiven Ta  10/22/18 1713       Emili Miranda  10/22/18 1840       Haim Mayer MD  10/23/18 0001

## 2018-10-23 ENCOUNTER — EPISODE CHANGES (OUTPATIENT)
Dept: SOCIAL WORK | Facility: HOSPITAL | Age: 83
End: 2018-10-23

## 2018-10-24 ENCOUNTER — PATIENT OUTREACH (OUTPATIENT)
Dept: CASE MANAGEMENT | Facility: OTHER | Age: 83
End: 2018-10-24

## 2018-10-24 NOTE — OUTREACH NOTE
Pt. States her hip is feeling better with use of the Ultram. She feels like the Ultram makes her very sleepy, teaching done on use of walker when using pain med and to rise slowly from sit to stand.Her son's check on her each day (several times) and help her with all needs. Pt. Has f/u appointment with PCP, she is not exercising due to pain but would consider PT if needed. Nurse provided patient education.No other questions or concerns voiced at this time.

## 2018-11-09 ENCOUNTER — EPISODE CHANGES (OUTPATIENT)
Dept: CASE MANAGEMENT | Facility: OTHER | Age: 83
End: 2018-11-09

## 2018-11-28 ENCOUNTER — OFFICE VISIT (OUTPATIENT)
Dept: FAMILY MEDICINE CLINIC | Facility: CLINIC | Age: 83
End: 2018-11-28

## 2018-11-28 VITALS
SYSTOLIC BLOOD PRESSURE: 120 MMHG | DIASTOLIC BLOOD PRESSURE: 64 MMHG | OXYGEN SATURATION: 94 % | BODY MASS INDEX: 22.78 KG/M2 | TEMPERATURE: 97.4 F | HEART RATE: 107 BPM | WEIGHT: 116 LBS | RESPIRATION RATE: 17 BRPM | HEIGHT: 60 IN

## 2018-11-28 DIAGNOSIS — I42.9 CARDIOMYOPATHY, UNSPECIFIED TYPE (HCC): Primary | ICD-10-CM

## 2018-11-28 DIAGNOSIS — E03.9 ACQUIRED HYPOTHYROIDISM: ICD-10-CM

## 2018-11-28 DIAGNOSIS — R06.02 SHORTNESS OF BREATH: ICD-10-CM

## 2018-11-28 DIAGNOSIS — Z79.01 LONG TERM CURRENT USE OF ANTICOAGULANT: ICD-10-CM

## 2018-11-28 DIAGNOSIS — Z95.0 CARDIAC PACEMAKER IN SITU: ICD-10-CM

## 2018-11-28 DIAGNOSIS — I10 ESSENTIAL HYPERTENSION: ICD-10-CM

## 2018-11-28 DIAGNOSIS — I48.91 ATRIAL FIBRILLATION, UNSPECIFIED TYPE (HCC): ICD-10-CM

## 2018-11-28 PROBLEM — I27.20 MODERATE TO SEVERE PULMONARY HYPERTENSION (HCC): Status: ACTIVE | Noted: 2017-09-14

## 2018-11-28 PROBLEM — I50.43 HEART FAILURE, ACUTE ON CHRONIC, SYSTOLIC AND DIASTOLIC (HCC): Status: ACTIVE | Noted: 2017-10-18

## 2018-11-28 PROBLEM — J96.01 ACUTE RESPIRATORY FAILURE WITH HYPOXIA (HCC): Status: ACTIVE | Noted: 2017-09-14

## 2018-11-28 LAB — INR PPP: 1.5 (ref 2–3)

## 2018-11-28 PROCEDURE — 36416 COLLJ CAPILLARY BLOOD SPEC: CPT | Performed by: INTERNAL MEDICINE

## 2018-11-28 PROCEDURE — 99214 OFFICE O/P EST MOD 30 MIN: CPT | Performed by: INTERNAL MEDICINE

## 2018-11-28 PROCEDURE — 85610 PROTHROMBIN TIME: CPT | Performed by: INTERNAL MEDICINE

## 2018-11-28 NOTE — PROGRESS NOTES
Subjective   Jazmine Brown is a 89 y.o. female. Patient is here today for establishing care as a new patient.  Patient's prior physician apparently got ill and retired.  She is on Coumadin for history of atrial fibrillation.  She also has a history of cardiomyopathy and congestive heart failure, hypertension, hypothyroidism and is status post thyroidectomy.  She does have some shortness of breath and has a home oxygen and a nebulizer and a rescue inhaler.  Today she seems in no acute distress and is breathing fine.  Her last INR was done in September.  She has been on Coumadin 3 mg and taking a half a tablet 2 days a week and a whole tablet the other days.  Chief Complaint   Patient presents with   • Establish Care   • Congestive Heart Failure          Vitals:    11/28/18 1426   BP: 120/64   Pulse: 107   Resp: 17   Temp: 97.4 °F (36.3 °C)   SpO2: 94%     The following portions of the patient's history were reviewed and updated as appropriate: allergies, current medications, past family history, past medical history, past social history, past surgical history and problem list.    Past Medical History:   Diagnosis Date   • Polyp, sigmoid colon    • Thyroid cancer (CMS/HCC)       Allergies   Allergen Reactions   • Codeine Hallucinations   • Latex Rash      Social History     Socioeconomic History   • Marital status:      Spouse name: Not on file   • Number of children: 2   • Years of education: Not on file   • Highest education level: Not on file   Social Needs   • Financial resource strain: Not on file   • Food insecurity - worry: Not on file   • Food insecurity - inability: Not on file   • Transportation needs - medical: Not on file   • Transportation needs - non-medical: Not on file   Occupational History   • Occupation: Lunch Room Lady at [employer]     Employer: RETIRED   Tobacco Use   • Smoking status: Never Smoker   • Smokeless tobacco: Never Used   Substance and Sexual Activity   • Alcohol  use: No   • Drug use: No   • Sexual activity: Defer   Other Topics Concern   • Not on file   Social History Narrative   • Not on file        Current Outpatient Medications:   •  albuterol (ACCUNEB) 1.25 MG/3ML nebulizer solution, Take 3 mL by nebulization Every 4 (Four) Hours As Needed for Wheezing., Disp: 90 mL, Rfl: 12  •  albuterol (PROAIR HFA) 108 (90 Base) MCG/ACT inhaler, Inhale 2 puffs Every 4 (Four) Hours As Needed for Wheezing or Shortness of Air., Disp: 18 g, Rfl: 5  •  bumetanide (BUMEX) 0.5 MG tablet, Take 2 tablets by mouth Daily., Disp: 180 tablet, Rfl: 1  •  carvedilol (COREG) 6.25 MG tablet, Take 1 tablet by mouth 2 (Two) Times a Day With Meals., Disp: 180 tablet, Rfl: 1  •  clindamycin (CLEOCIN) 150 MG capsule, , Disp: , Rfl:   •  HYDROcodone-acetaminophen (NORCO) 5-325 MG per tablet, , Disp: , Rfl:   •  levothyroxine (SYNTHROID, LEVOTHROID) 75 MCG tablet, Take 1 tablet by mouth Daily., Disp: 90 tablet, Rfl: 1  •  lisinopril (PRINIVIL,ZESTRIL) 5 MG tablet, TAKE 1 TABLET BY MOUTH ONCE DAILY, Disp: 90 tablet, Rfl: 1  •  neomycin-polymyxin-hydrocortisone (CORTISPORIN) 3.5-38575-0 otic solution, ADMINSTER 2 DROPS INTO BOTH EARS 3 TIMES DAILY, Disp: 10 mL, Rfl: 0  •  ondansetron ODT (ZOFRAN-ODT) 4 MG disintegrating tablet, Take 1 tablet by mouth 4 (Four) Times a Day As Needed for Nausea or Vomiting., Disp: 15 tablet, Rfl: 0  •  PARoxetine (PAXIL) 20 MG tablet, Take 1 tablet by mouth Daily., Disp: 90 tablet, Rfl: 1  •  traMADol (ULTRAM) 50 MG tablet, Take 1 tablet by mouth Every 12 (Twelve) Hours As Needed for Severe Pain  (try 1/2 1st and use very sparingly)., Disp: 12 tablet, Rfl: 0  •  triamcinolone (KENALOG) 0.1 % ointment, Apply  topically 2 (Two) Times a Day As Needed for Irritation., Disp: 30 g, Rfl: 0  •  umeclidinium-vilanterol (ANORO ELLIPTA) 62.5-25 MCG/INH aerosol powder  inhaler, Inhale 1 puff Daily., Disp: 1 each, Rfl: 0  •  vitamin D (ERGOCALCIFEROL) 06372 units capsule capsule, Take 1  capsule by mouth Every 7 (Seven) Days., Disp: 12 capsule, Rfl: 3  •  warfarin (COUMADIN) 3 MG tablet, Take 1 tablet by mouth Daily., Disp: 40 tablet, Rfl: 5     Objective     History of Present Illness     Review of Systems   Constitutional: Positive for fatigue.   HENT: Negative.    Eyes: Negative.    Respiratory: Positive for shortness of breath.    Cardiovascular: Negative.    Gastrointestinal: Negative.    Genitourinary: Negative.    Musculoskeletal: Positive for arthralgias.   Skin: Negative.    Neurological: Negative.    Psychiatric/Behavioral: Negative.        Physical Exam   Constitutional: She is oriented to person, place, and time. She appears well-developed and well-nourished.   Pleasant, cooperative in no distress with a blood pressure 120/80   HENT:   Head: Normocephalic and atraumatic.   Eyes: Conjunctivae are normal. Pupils are equal, round, and reactive to light. No scleral icterus.   Neck: Normal range of motion. Neck supple. No thyromegaly present.   Cardiovascular: Normal rate, regular rhythm and normal heart sounds.   Pulmonary/Chest: Effort normal and breath sounds normal. No stridor. No respiratory distress. She has no wheezes. She has no rales.   Abdominal: Soft. Bowel sounds are normal.   Musculoskeletal: Normal range of motion. She exhibits deformity. She exhibits no edema.   Patient has some arthritic changes in the MCP PIP joints of the hands   Neurological: She is alert and oriented to person, place, and time.   Skin: Skin is warm and dry.   Psychiatric: She has a normal mood and affect. Her behavior is normal.   Nursing note and vitals reviewed.      ASSESSMENT  INR was low at 1.5.  1-hypertension, controlled  #2-history of cardiomyopathy, atrial fibrillation and pacemaker placement.  #3-shortness of breath, most likely related to the heart  #4-history of thyroidectomy with subsequent hypothyroidism, on replacement  #5-Coumadin therapy  #6-chronic fatigue     Problem List Items Addressed  This Visit        Cardiovascular and Mediastinum    Atrial fibrillation (CMS/HCC)    Relevant Orders    POC INR    Essential hypertension    Cardiac pacemaker in situ    Cardiomyopathy (CMS/HCC) - Primary       Respiratory    Shortness of breath       Endocrine    Hypothyroidism       Other    Long term current use of anticoagulant    Relevant Orders    POC INR          PLAN  I'm going to increase the patient's Coumadin to 3 mg daily.  She will continue her other medicines as now.  I've given her samples of Anoro Ellipta to try once a day and see if that helps her breathing.  I will recheck her in 2 weeks with a pro time and INR    There are no Patient Instructions on file for this visit.  No Follow-up on file.

## 2018-12-13 ENCOUNTER — OFFICE VISIT (OUTPATIENT)
Dept: FAMILY MEDICINE CLINIC | Facility: CLINIC | Age: 83
End: 2018-12-13

## 2018-12-13 VITALS
SYSTOLIC BLOOD PRESSURE: 120 MMHG | HEIGHT: 60 IN | DIASTOLIC BLOOD PRESSURE: 70 MMHG | BODY MASS INDEX: 23.16 KG/M2 | OXYGEN SATURATION: 97 % | RESPIRATION RATE: 17 BRPM | HEART RATE: 95 BPM | WEIGHT: 118 LBS

## 2018-12-13 DIAGNOSIS — I10 ESSENTIAL HYPERTENSION: Primary | ICD-10-CM

## 2018-12-13 DIAGNOSIS — J96.01 ACUTE RESPIRATORY FAILURE WITH HYPOXIA (HCC): ICD-10-CM

## 2018-12-13 DIAGNOSIS — Z79.01 LONG TERM CURRENT USE OF ANTICOAGULANT: ICD-10-CM

## 2018-12-13 DIAGNOSIS — I48.91 ATRIAL FIBRILLATION, UNSPECIFIED TYPE (HCC): ICD-10-CM

## 2018-12-13 LAB — INR PPP: 1.8 (ref 2–3)

## 2018-12-13 PROCEDURE — 85610 PROTHROMBIN TIME: CPT | Performed by: INTERNAL MEDICINE

## 2018-12-13 PROCEDURE — 36415 COLL VENOUS BLD VENIPUNCTURE: CPT | Performed by: INTERNAL MEDICINE

## 2018-12-13 PROCEDURE — 99213 OFFICE O/P EST LOW 20 MIN: CPT | Performed by: INTERNAL MEDICINE

## 2018-12-13 RX ORDER — WARFARIN SODIUM 3 MG/1
3 TABLET ORAL
Qty: 30 TABLET | Refills: 5 | Status: SHIPPED | OUTPATIENT
Start: 2018-12-13 | End: 2019-01-01

## 2018-12-13 RX ORDER — WARFARIN SODIUM 1 MG/1
TABLET ORAL
Qty: 30 TABLET | Refills: 5 | Status: SHIPPED | OUTPATIENT
Start: 2018-12-13 | End: 2019-01-01

## 2018-12-13 NOTE — PROGRESS NOTES
Subjective   Jamzine Brown is a 89 y.o. female. Patient is here today for follow-up on her hypertension, history of atrial fibrillation, COPD with hypoxia and Coumadin therapy.  At the last visit I increased her Coumadin to 3 mg daily and she's here for follow-up.  I also gave her a sample of a new inhaler.  She says the new inhalers helped her breathing quite a bit.  She's had no unusual bruising or bleeding and no new acute symptoms  Chief Complaint   Patient presents with   • Anticoagulation   • Shortness of Breath   • Dizziness          Vitals:    12/13/18 1545   BP: 120/70   Pulse: 95   Resp: 17   SpO2: 97%     The following portions of the patient's history were reviewed and updated as appropriate: allergies, current medications, past family history, past medical history, past social history, past surgical history and problem list.    Past Medical History:   Diagnosis Date   • Polyp, sigmoid colon    • Thyroid cancer (CMS/[facility])       Allergies   Allergen Reactions   • Codeine Hallucinations   • Latex Rash      Social History     Socioeconomic History   • Marital status:      Spouse name: Not on file   • Number of children: 2   • Years of education: Not on file   • Highest education level: Not on file   Social Needs   • Financial resource strain: Not on file   • Food insecurity - worry: Not on file   • Food insecurity - inability: Not on file   • Transportation needs - medical: Not on file   • Transportation needs - non-medical: Not on file   Occupational History   • Occupation: Lunch Room Lady at [employer]     Employer: RETIRED   Tobacco Use   • Smoking status: Never Smoker   • Smokeless tobacco: Never Used   Substance and Sexual Activity   • Alcohol use: No   • Drug use: No   • Sexual activity: Defer   Other Topics Concern   • Not on file   Social History Narrative   • Not on file        Current Outpatient Medications:   •  albuterol (ACCUNEB) 1.25 MG/3ML nebulizer solution, Take 3 mL by  nebulization Every 4 (Four) Hours As Needed for Wheezing., Disp: 90 mL, Rfl: 12  •  albuterol (PROAIR HFA) 108 (90 Base) MCG/ACT inhaler, Inhale 2 puffs Every 4 (Four) Hours As Needed for Wheezing or Shortness of Air., Disp: 18 g, Rfl: 5  •  bumetanide (BUMEX) 0.5 MG tablet, Take 2 tablets by mouth Daily., Disp: 180 tablet, Rfl: 1  •  carvedilol (COREG) 6.25 MG tablet, Take 1 tablet by mouth 2 (Two) Times a Day With Meals., Disp: 180 tablet, Rfl: 1  •  clindamycin (CLEOCIN) 150 MG capsule, , Disp: , Rfl:   •  HYDROcodone-acetaminophen (NORCO) 5-325 MG per tablet, , Disp: , Rfl:   •  levothyroxine (SYNTHROID, LEVOTHROID) 75 MCG tablet, Take 1 tablet by mouth Daily., Disp: 90 tablet, Rfl: 1  •  lisinopril (PRINIVIL,ZESTRIL) 5 MG tablet, TAKE 1 TABLET BY MOUTH ONCE DAILY, Disp: 90 tablet, Rfl: 1  •  neomycin-polymyxin-hydrocortisone (CORTISPORIN) 3.5-10413-3 otic solution, ADMINSTER 2 DROPS INTO BOTH EARS 3 TIMES DAILY, Disp: 10 mL, Rfl: 0  •  ondansetron ODT (ZOFRAN-ODT) 4 MG disintegrating tablet, Take 1 tablet by mouth 4 (Four) Times a Day As Needed for Nausea or Vomiting., Disp: 15 tablet, Rfl: 0  •  PARoxetine (PAXIL) 20 MG tablet, Take 1 tablet by mouth Daily., Disp: 90 tablet, Rfl: 1  •  traMADol (ULTRAM) 50 MG tablet, Take 1 tablet by mouth Every 12 (Twelve) Hours As Needed for Severe Pain  (try 1/2 1st and use very sparingly)., Disp: 12 tablet, Rfl: 0  •  triamcinolone (KENALOG) 0.1 % ointment, Apply  topically 2 (Two) Times a Day As Needed for Irritation., Disp: 30 g, Rfl: 0  •  umeclidinium-vilanterol (ANORO ELLIPTA) 62.5-25 MCG/INH aerosol powder  inhaler, Inhale 1 puff Daily., Disp: 1 each, Rfl: 6  •  vitamin D (ERGOCALCIFEROL) 71749 units capsule capsule, Take 1 capsule by mouth Every 7 (Seven) Days., Disp: 12 capsule, Rfl: 3  •  warfarin (COUMADIN) 1 MG tablet, Take 1 tab every other day, Disp: 30 tablet, Rfl: 5  •  warfarin (COUMADIN) 3 MG tablet, Take 1 tablet by mouth Daily., Disp: 30 tablet, Rfl: 5      Objective     History of Present Illness     Review of Systems   Constitutional: Negative.    HENT: Negative.    Eyes: Negative.    Respiratory: Negative.    Cardiovascular: Negative.    Gastrointestinal: Negative.    Genitourinary: Negative.    Musculoskeletal: Negative.    Skin: Negative.    Neurological: Negative.    Psychiatric/Behavioral: Negative.        Physical Exam   Constitutional: She appears well-developed and well-nourished.   Pleasant, cooperative no distress with a pulse oximetry of 97%   HENT:   Head: Normocephalic and atraumatic.   Eyes: Conjunctivae are normal. Pupils are equal, round, and reactive to light.   Neck: Normal range of motion.   Cardiovascular: Normal rate, regular rhythm and normal heart sounds.   Pulmonary/Chest: Effort normal and breath sounds normal. No respiratory distress. She has no wheezes. She has no rales.   Musculoskeletal: Normal range of motion.   Neurological: She is alert.   Skin: Skin is warm and dry.   Psychiatric: She has a normal mood and affect. Her behavior is normal.   Nursing note and vitals reviewed.      ASSESSMENT  the patient's breathing sounds improved, her cardiac rhythm is regular and her blood pressure was well controlled.  Pulse oximetry was 97%.  Her INR is still a bit low at 1.8.     Problem List Items Addressed This Visit        Cardiovascular and Mediastinum    Atrial fibrillation (CMS/HCC)    Essential hypertension - Primary       Respiratory    Acute respiratory failure with hypoxia (CMS/HCC)       Other    Long term current use of anticoagulant          PLAN I sent in a prescription for Anoro Ellipta.  I'm getting the patient Coumadin 1 mg tablets in addition to her 3 mg tablets.  I'm going to have her take 3 mg daily and every other day add in a 1 mg tablet so she will be alternating 3 mg with 4 mg every other day.  I would like to recheck the patient in 3 weeks with a pro time and INR and want to get her a Pneumovax 23 immunization  then.    There are no Patient Instructions on file for this visit.  Return in about 3 weeks (around 1/3/2019).

## 2018-12-27 RX ORDER — ERGOCALCIFEROL 1.25 MG/1
CAPSULE ORAL
Qty: 12 CAPSULE | Refills: 3 | OUTPATIENT
Start: 2018-12-27

## 2018-12-31 RX ORDER — ERGOCALCIFEROL 1.25 MG/1
50000 CAPSULE ORAL
Qty: 12 CAPSULE | Refills: 3 | Status: SHIPPED | OUTPATIENT
Start: 2018-12-31 | End: 2019-03-06

## 2019-01-01 ENCOUNTER — CLINICAL SUPPORT (OUTPATIENT)
Dept: FAMILY MEDICINE CLINIC | Facility: CLINIC | Age: 84
End: 2019-01-01

## 2019-01-01 ENCOUNTER — EPISODE CHANGES (OUTPATIENT)
Dept: CASE MANAGEMENT | Facility: OTHER | Age: 84
End: 2019-01-01

## 2019-01-01 ENCOUNTER — HOSPITAL ENCOUNTER (INPATIENT)
Facility: HOSPITAL | Age: 84
LOS: 4 days | Discharge: HOME | End: 2019-09-15
Attending: EMERGENCY MEDICINE | Admitting: INTERNAL MEDICINE

## 2019-01-01 ENCOUNTER — READMISSION MANAGEMENT (OUTPATIENT)
Dept: CALL CENTER | Facility: HOSPITAL | Age: 84
End: 2019-01-01

## 2019-01-01 ENCOUNTER — TELEPHONE (OUTPATIENT)
Dept: FAMILY MEDICINE CLINIC | Facility: CLINIC | Age: 84
End: 2019-01-01

## 2019-01-01 ENCOUNTER — OFFICE VISIT (OUTPATIENT)
Dept: FAMILY MEDICINE CLINIC | Facility: CLINIC | Age: 84
End: 2019-01-01

## 2019-01-01 ENCOUNTER — HOSPITAL ENCOUNTER (OUTPATIENT)
Dept: GENERAL RADIOLOGY | Facility: HOSPITAL | Age: 84
Discharge: HOME | End: 2019-08-21

## 2019-01-01 ENCOUNTER — OFFICE VISIT (OUTPATIENT)
Dept: CARDIOLOGY | Facility: CLINIC | Age: 84
End: 2019-01-01

## 2019-01-01 ENCOUNTER — APPOINTMENT (OUTPATIENT)
Dept: CARDIOLOGY | Facility: HOSPITAL | Age: 84
End: 2019-01-01

## 2019-01-01 ENCOUNTER — APPOINTMENT (OUTPATIENT)
Dept: GENERAL RADIOLOGY | Facility: HOSPITAL | Age: 84
End: 2019-01-01

## 2019-01-01 ENCOUNTER — PATIENT OUTREACH (OUTPATIENT)
Dept: CASE MANAGEMENT | Facility: OTHER | Age: 84
End: 2019-01-01

## 2019-01-01 ENCOUNTER — HOSPITAL ENCOUNTER (OUTPATIENT)
Dept: GENERAL RADIOLOGY | Facility: HOSPITAL | Age: 84
Discharge: HOME | End: 2019-08-21
Admitting: INTERNAL MEDICINE

## 2019-01-01 ENCOUNTER — CLINICAL SUPPORT NO REQUIREMENTS (OUTPATIENT)
Dept: CARDIOLOGY | Facility: CLINIC | Age: 84
End: 2019-01-01

## 2019-01-01 VITALS
SYSTOLIC BLOOD PRESSURE: 132 MMHG | TEMPERATURE: 98 F | HEIGHT: 61 IN | HEART RATE: 75 BPM | WEIGHT: 103 LBS | BODY MASS INDEX: 23.56 KG/M2 | OXYGEN SATURATION: 92 % | HEIGHT: 60 IN | HEART RATE: 88 BPM | DIASTOLIC BLOOD PRESSURE: 70 MMHG | RESPIRATION RATE: 18 BRPM | RESPIRATION RATE: 16 BRPM | DIASTOLIC BLOOD PRESSURE: 70 MMHG | BODY MASS INDEX: 19.45 KG/M2 | SYSTOLIC BLOOD PRESSURE: 120 MMHG | WEIGHT: 120 LBS

## 2019-01-01 VITALS
WEIGHT: 112.2 LBS | OXYGEN SATURATION: 94 % | HEIGHT: 61 IN | TEMPERATURE: 97.4 F | BODY MASS INDEX: 21.18 KG/M2 | HEART RATE: 79 BPM

## 2019-01-01 VITALS
BODY MASS INDEX: 21.95 KG/M2 | OXYGEN SATURATION: 97 % | RESPIRATION RATE: 18 BRPM | HEART RATE: 82 BPM | WEIGHT: 111.8 LBS | TEMPERATURE: 98.3 F | DIASTOLIC BLOOD PRESSURE: 58 MMHG | HEIGHT: 60 IN | SYSTOLIC BLOOD PRESSURE: 96 MMHG

## 2019-01-01 VITALS
RESPIRATION RATE: 16 BRPM | OXYGEN SATURATION: 90 % | BODY MASS INDEX: 22.58 KG/M2 | HEIGHT: 60 IN | SYSTOLIC BLOOD PRESSURE: 120 MMHG | HEART RATE: 77 BPM | WEIGHT: 115 LBS | TEMPERATURE: 98.8 F | DIASTOLIC BLOOD PRESSURE: 62 MMHG

## 2019-01-01 VITALS
HEIGHT: 61 IN | TEMPERATURE: 97.5 F | OXYGEN SATURATION: 96 % | DIASTOLIC BLOOD PRESSURE: 68 MMHG | RESPIRATION RATE: 18 BRPM | HEART RATE: 78 BPM | SYSTOLIC BLOOD PRESSURE: 120 MMHG | WEIGHT: 110.8 LBS | BODY MASS INDEX: 20.92 KG/M2

## 2019-01-01 VITALS
WEIGHT: 114 LBS | HEART RATE: 91 BPM | DIASTOLIC BLOOD PRESSURE: 60 MMHG | OXYGEN SATURATION: 96 % | SYSTOLIC BLOOD PRESSURE: 100 MMHG | HEIGHT: 61 IN | BODY MASS INDEX: 21.52 KG/M2

## 2019-01-01 VITALS
HEART RATE: 65 BPM | TEMPERATURE: 97.7 F | BODY MASS INDEX: 22.58 KG/M2 | DIASTOLIC BLOOD PRESSURE: 60 MMHG | RESPIRATION RATE: 16 BRPM | HEIGHT: 60 IN | OXYGEN SATURATION: 98 % | WEIGHT: 115 LBS | SYSTOLIC BLOOD PRESSURE: 110 MMHG

## 2019-01-01 VITALS
SYSTOLIC BLOOD PRESSURE: 122 MMHG | HEIGHT: 61 IN | WEIGHT: 110.4 LBS | TEMPERATURE: 98 F | BODY MASS INDEX: 20.84 KG/M2 | OXYGEN SATURATION: 97 % | HEART RATE: 71 BPM | RESPIRATION RATE: 16 BRPM | DIASTOLIC BLOOD PRESSURE: 68 MMHG

## 2019-01-01 VITALS
SYSTOLIC BLOOD PRESSURE: 120 MMHG | DIASTOLIC BLOOD PRESSURE: 68 MMHG | HEIGHT: 60 IN | WEIGHT: 113.8 LBS | DIASTOLIC BLOOD PRESSURE: 60 MMHG | WEIGHT: 116 LBS | BODY MASS INDEX: 22.78 KG/M2 | TEMPERATURE: 97.5 F | HEART RATE: 107 BPM | RESPIRATION RATE: 18 BRPM | HEIGHT: 61 IN | RESPIRATION RATE: 18 BRPM | BODY MASS INDEX: 21.49 KG/M2 | OXYGEN SATURATION: 95 % | OXYGEN SATURATION: 88 % | SYSTOLIC BLOOD PRESSURE: 106 MMHG | HEART RATE: 59 BPM

## 2019-01-01 VITALS
DIASTOLIC BLOOD PRESSURE: 70 MMHG | HEIGHT: 60 IN | RESPIRATION RATE: 18 BRPM | HEART RATE: 84 BPM | BODY MASS INDEX: 22.3 KG/M2 | WEIGHT: 113.6 LBS | OXYGEN SATURATION: 96 % | TEMPERATURE: 97.7 F | SYSTOLIC BLOOD PRESSURE: 110 MMHG

## 2019-01-01 VITALS
BODY MASS INDEX: 20.01 KG/M2 | DIASTOLIC BLOOD PRESSURE: 64 MMHG | SYSTOLIC BLOOD PRESSURE: 114 MMHG | RESPIRATION RATE: 18 BRPM | HEIGHT: 61 IN | WEIGHT: 106 LBS | TEMPERATURE: 97.5 F | HEART RATE: 70 BPM | OXYGEN SATURATION: 97 %

## 2019-01-01 VITALS
OXYGEN SATURATION: 90 % | WEIGHT: 116 LBS | TEMPERATURE: 97.2 F | RESPIRATION RATE: 16 BRPM | HEART RATE: 88 BPM | SYSTOLIC BLOOD PRESSURE: 100 MMHG | HEIGHT: 60 IN | DIASTOLIC BLOOD PRESSURE: 60 MMHG | BODY MASS INDEX: 22.78 KG/M2

## 2019-01-01 VITALS
SYSTOLIC BLOOD PRESSURE: 150 MMHG | HEART RATE: 111 BPM | DIASTOLIC BLOOD PRESSURE: 80 MMHG | WEIGHT: 119 LBS | BODY MASS INDEX: 23.36 KG/M2 | OXYGEN SATURATION: 94 % | HEIGHT: 60 IN | RESPIRATION RATE: 17 BRPM

## 2019-01-01 DIAGNOSIS — I48.91 ATRIAL FIBRILLATION, UNSPECIFIED TYPE (HCC): ICD-10-CM

## 2019-01-01 DIAGNOSIS — R06.2 WHEEZING: ICD-10-CM

## 2019-01-01 DIAGNOSIS — I27.20 MODERATE TO SEVERE PULMONARY HYPERTENSION (HCC): Primary | ICD-10-CM

## 2019-01-01 DIAGNOSIS — I44.30 AV BLOCK: ICD-10-CM

## 2019-01-01 DIAGNOSIS — R53.82 CHRONIC FATIGUE: ICD-10-CM
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
## 2019-01-01 DIAGNOSIS — Z79.01 LONG TERM CURRENT USE OF ANTICOAGULANT: Primary | ICD-10-CM
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
## 2019-01-01 DIAGNOSIS — D64.9 ANEMIA, UNSPECIFIED TYPE: ICD-10-CM

## 2019-01-01 DIAGNOSIS — M54.50 ACUTE MIDLINE LOW BACK PAIN WITHOUT SCIATICA: ICD-10-CM

## 2019-01-01 DIAGNOSIS — Z95.0 CARDIAC PACEMAKER IN SITU: ICD-10-CM

## 2019-01-01 DIAGNOSIS — E03.9 ACQUIRED HYPOTHYROIDISM: ICD-10-CM

## 2019-01-01 DIAGNOSIS — I48.91 ATRIAL FIBRILLATION, UNSPECIFIED TYPE (HCC): Primary | ICD-10-CM

## 2019-01-01 DIAGNOSIS — N76.0 ACUTE VAGINITIS: ICD-10-CM

## 2019-01-01 DIAGNOSIS — J96.11 CHRONIC RESPIRATORY FAILURE WITH HYPOXIA (HCC): ICD-10-CM

## 2019-01-01 DIAGNOSIS — N30.00 ACUTE CYSTITIS WITHOUT HEMATURIA: ICD-10-CM

## 2019-01-01 DIAGNOSIS — R42 VERTIGO: Primary | ICD-10-CM

## 2019-01-01 DIAGNOSIS — I10 ESSENTIAL HYPERTENSION: ICD-10-CM
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
## 2019-01-01 DIAGNOSIS — I10 ESSENTIAL HYPERTENSION: Primary | ICD-10-CM

## 2019-01-01 DIAGNOSIS — R53.1 WEAKNESS: ICD-10-CM

## 2019-01-01 DIAGNOSIS — E61.1 LOW IRON: ICD-10-CM

## 2019-01-01 DIAGNOSIS — Z79.01 LONG TERM CURRENT USE OF ANTICOAGULANT: ICD-10-CM

## 2019-01-01 DIAGNOSIS — I42.9 CARDIOMYOPATHY, UNSPECIFIED TYPE (HCC): ICD-10-CM

## 2019-01-01 DIAGNOSIS — Z95.0 PRESENCE OF CARDIAC PACEMAKER: ICD-10-CM

## 2019-01-01 DIAGNOSIS — J96.01 ACUTE RESPIRATORY FAILURE WITH HYPOXIA (HCC): ICD-10-CM

## 2019-01-01 DIAGNOSIS — R04.0 BLEEDING NOSE: ICD-10-CM

## 2019-01-01 DIAGNOSIS — E03.8 OTHER SPECIFIED HYPOTHYROIDISM: ICD-10-CM

## 2019-01-01 DIAGNOSIS — R06.2 WHEEZING: Primary | ICD-10-CM

## 2019-01-01 DIAGNOSIS — I50.43 HEART FAILURE, ACUTE ON CHRONIC, SYSTOLIC AND DIASTOLIC (HCC): ICD-10-CM

## 2019-01-01 DIAGNOSIS — Z12.31 SCREENING MAMMOGRAM, ENCOUNTER FOR: Primary | ICD-10-CM

## 2019-01-01 DIAGNOSIS — I27.20 MODERATE TO SEVERE PULMONARY HYPERTENSION (HCC): ICD-10-CM

## 2019-01-01 DIAGNOSIS — I27.20 PULMONARY HYPERTENSION (HCC): ICD-10-CM

## 2019-01-01 DIAGNOSIS — N30.91 CYSTITIS WITH HEMATURIA: ICD-10-CM
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
## 2019-01-01 DIAGNOSIS — R35.0 URINE FREQUENCY: Primary | ICD-10-CM

## 2019-01-01 DIAGNOSIS — Z23 NEED FOR IMMUNIZATION AGAINST INFLUENZA: ICD-10-CM

## 2019-01-01 DIAGNOSIS — D64.9 ANEMIA, UNSPECIFIED TYPE: Primary | ICD-10-CM

## 2019-01-01 DIAGNOSIS — T14.8XXA HEMATOMA: ICD-10-CM

## 2019-01-01 DIAGNOSIS — I44.2 COMPLETE HEART BLOCK (HCC): Primary | ICD-10-CM

## 2019-01-01 DIAGNOSIS — R73.9 BLOOD GLUCOSE ELEVATED: Primary | ICD-10-CM

## 2019-01-01 DIAGNOSIS — Z74.09 LIMITED MOBILITY: Primary | ICD-10-CM

## 2019-01-01 DIAGNOSIS — I50.43 HEART FAILURE, ACUTE ON CHRONIC, SYSTOLIC AND DIASTOLIC (HCC): Primary | ICD-10-CM

## 2019-01-01 DIAGNOSIS — I50.9 ACUTE ON CHRONIC CONGESTIVE HEART FAILURE, UNSPECIFIED HEART FAILURE TYPE (HCC): Primary | ICD-10-CM

## 2019-01-01 DIAGNOSIS — F32.1 CURRENT MODERATE EPISODE OF MAJOR DEPRESSIVE DISORDER, UNSPECIFIED WHETHER RECURRENT (HCC): ICD-10-CM

## 2019-01-01 LAB
ALBUMIN SERPL-MCNC: 3.9 G/DL (ref 3.5–5.2)
ALBUMIN SERPL-MCNC: 4.1 G/DL (ref 3.5–5.2)
ALBUMIN SERPL-MCNC: 4.4 G/DL (ref 3.5–5.2)
ALBUMIN/GLOB SERPL: 1.4 G/DL
ALBUMIN/GLOB SERPL: 1.6 G/DL
ALBUMIN/GLOB SERPL: 1.9 G/DL
ALP SERPL-CCNC: 60 U/L (ref 39–117)
ALP SERPL-CCNC: 74 U/L (ref 39–117)
ALP SERPL-CCNC: 81 U/L (ref 39–117)
ALT SERPL W P-5'-P-CCNC: 7 U/L (ref 1–33)
ALT SERPL-CCNC: 8 U/L (ref 1–33)
ALT SERPL-CCNC: 8 U/L (ref 1–33)
ANION GAP SERPL CALCULATED.3IONS-SCNC: 10.9 MMOL/L (ref 5–15)
ANION GAP SERPL CALCULATED.3IONS-SCNC: 11.7 MMOL/L (ref 5–15)
ANION GAP SERPL CALCULATED.3IONS-SCNC: 12.2 MMOL/L (ref 5–15)
ANION GAP SERPL CALCULATED.3IONS-SCNC: 12.7 MMOL/L (ref 5–15)
ANION GAP SERPL CALCULATED.3IONS-SCNC: 8 MMOL/L (ref 5–15)
ANISOCYTOSIS BLD QL: ABNORMAL
AORTIC DIMENSIONLESS INDEX: 0.4 (DI)
AST SERPL-CCNC: 19 U/L (ref 1–32)
AST SERPL-CCNC: 21 U/L (ref 1–32)
AST SERPL-CCNC: 24 U/L (ref 1–32)
B PARAPERT DNA SPEC QL NAA+PROBE: NOT DETECTED
B PERT DNA SPEC QL NAA+PROBE: NOT DETECTED
BASOPHILS # BLD AUTO: (no result) 10*3/UL
BASOPHILS # BLD MANUAL: 0 10*3/MM3 (ref 0–0.2)
BASOPHILS # BLD MANUAL: 0.08 10*3/MM3 (ref 0–0.2)
BASOPHILS # BLD MANUAL: 0.09 10*3/MM3 (ref 0–0.2)
BASOPHILS NFR BLD MANUAL: 0 % (ref 0–1.5)
BASOPHILS NFR BLD MANUAL: 1 % (ref 0–1.5)
BASOPHILS NFR BLD MANUAL: 1 % (ref 0–1.5)
BH CV ECHO MEAS - ACS: 1.8 CM
BH CV ECHO MEAS - AI DEC SLOPE: 115 CM/SEC^2
BH CV ECHO MEAS - AI MAX PG: 24.8 MMHG
BH CV ECHO MEAS - AI MAX VEL: 249 CM/SEC
BH CV ECHO MEAS - AI P1/2T: 634.2 MSEC
BH CV ECHO MEAS - AO MAX PG (FULL): 5.8 MMHG
BH CV ECHO MEAS - AO MAX PG: 7.1 MMHG
BH CV ECHO MEAS - AO MEAN PG (FULL): 2 MMHG
BH CV ECHO MEAS - AO MEAN PG: 3 MMHG
BH CV ECHO MEAS - AO ROOT AREA (BSA CORRECTED): 2
BH CV ECHO MEAS - AO ROOT AREA: 6.6 CM^2
BH CV ECHO MEAS - AO ROOT DIAM: 2.9 CM
BH CV ECHO MEAS - AO V2 MAX: 133 CM/SEC
BH CV ECHO MEAS - AO V2 MEAN: 81.4 CM/SEC
BH CV ECHO MEAS - AO V2 VTI: 19.8 CM
BH CV ECHO MEAS - AVA(I,A): 1.2 CM^2
BH CV ECHO MEAS - AVA(I,D): 1.2 CM^2
BH CV ECHO MEAS - AVA(V,A): 1.3 CM^2
BH CV ECHO MEAS - AVA(V,D): 1.3 CM^2
BH CV ECHO MEAS - BSA(HAYCOCK): 1.4 M^2
BH CV ECHO MEAS - BSA: 1.4 M^2
BH CV ECHO MEAS - BZI_BMI: 20 KILOGRAMS/M^2
BH CV ECHO MEAS - BZI_METRIC_HEIGHT: 154.9 CM
BH CV ECHO MEAS - BZI_METRIC_WEIGHT: 48.1 KG
BH CV ECHO MEAS - EDV(CUBED): 79.5 ML
BH CV ECHO MEAS - EDV(MOD-SP2): 52 ML
BH CV ECHO MEAS - EDV(MOD-SP4): 56 ML
BH CV ECHO MEAS - EDV(TEICH): 83.1 ML
BH CV ECHO MEAS - EF(CUBED): 66 %
BH CV ECHO MEAS - EF(MOD-BP): 48 %
BH CV ECHO MEAS - EF(MOD-SP2): 50 %
BH CV ECHO MEAS - EF(MOD-SP4): 51.8 %
BH CV ECHO MEAS - EF(TEICH): 57.9 %
BH CV ECHO MEAS - ESV(CUBED): 27 ML
BH CV ECHO MEAS - ESV(MOD-SP2): 26 ML
BH CV ECHO MEAS - ESV(MOD-SP4): 27 ML
BH CV ECHO MEAS - ESV(TEICH): 35 ML
BH CV ECHO MEAS - FS: 30.2 %
BH CV ECHO MEAS - IVS/LVPW: 1.1
BH CV ECHO MEAS - IVSD: 1.3 CM
BH CV ECHO MEAS - LAT PEAK E' VEL: 4.7 CM/SEC
BH CV ECHO MEAS - LV DIASTOLIC VOL/BSA (35-75): 38.8 ML/M^2
BH CV ECHO MEAS - LV MASS(C)D: 196.1 GRAMS
BH CV ECHO MEAS - LV MASS(C)DI: 135.9 GRAMS/M^2
BH CV ECHO MEAS - LV MAX PG: 1.2 MMHG
BH CV ECHO MEAS - LV MEAN PG: 1 MMHG
BH CV ECHO MEAS - LV SYSTOLIC VOL/BSA (12-30): 18.7 ML/M^2
BH CV ECHO MEAS - LV V1 MAX: 55.4 CM/SEC
BH CV ECHO MEAS - LV V1 MEAN: 35.1 CM/SEC
BH CV ECHO MEAS - LV V1 VTI: 7.8 CM
BH CV ECHO MEAS - LVIDD: 4.3 CM
BH CV ECHO MEAS - LVIDS: 3 CM
BH CV ECHO MEAS - LVLD AP2: 6.5 CM
BH CV ECHO MEAS - LVLD AP4: 6.3 CM
BH CV ECHO MEAS - LVLS AP2: 6.4 CM
BH CV ECHO MEAS - LVLS AP4: 5.7 CM
BH CV ECHO MEAS - LVOT AREA (M): 3.1 CM^2
BH CV ECHO MEAS - LVOT AREA: 3.1 CM^2
BH CV ECHO MEAS - LVOT DIAM: 2 CM
BH CV ECHO MEAS - LVPWD: 1.2 CM
BH CV ECHO MEAS - MED PEAK E' VEL: 4.6 CM/SEC
BH CV ECHO MEAS - MR MAX PG: 35 MMHG
BH CV ECHO MEAS - MR MAX VEL: 296 CM/SEC
BH CV ECHO MEAS - MV DEC SLOPE: 482.5 CM/SEC^2
BH CV ECHO MEAS - MV DEC TIME: 246 SEC
BH CV ECHO MEAS - MV E MAX VEL: 109 CM/SEC
BH CV ECHO MEAS - MV MAX PG: 6.7 MMHG
BH CV ECHO MEAS - MV MEAN PG: 2 MMHG
BH CV ECHO MEAS - MV P1/2T MAX VEL: 140 CM/SEC
BH CV ECHO MEAS - MV P1/2T: 85 MSEC
BH CV ECHO MEAS - MV V2 MAX: 129 CM/SEC
BH CV ECHO MEAS - MV V2 MEAN: 69.2 CM/SEC
BH CV ECHO MEAS - MV V2 VTI: 26.1 CM
BH CV ECHO MEAS - MVA P1/2T LCG: 1.6 CM^2
BH CV ECHO MEAS - MVA(P1/2T): 2.6 CM^2
BH CV ECHO MEAS - MVA(VTI): 0.94 CM^2
BH CV ECHO MEAS - PA MAX PG (FULL): 1.2 MMHG
BH CV ECHO MEAS - PA MAX PG: 2.2 MMHG
BH CV ECHO MEAS - PA V2 MAX: 73.7 CM/SEC
BH CV ECHO MEAS - PI END-D VEL: 86.9 CM/SEC
BH CV ECHO MEAS - PULM DIAS VEL: 38.8 CM/SEC
BH CV ECHO MEAS - PULM S/D: 0.95
BH CV ECHO MEAS - PULM SYS VEL: 36.9 CM/SEC
BH CV ECHO MEAS - RAP SYSTOLE: 3 MMHG
BH CV ECHO MEAS - RV MAX PG: 0.98 MMHG
BH CV ECHO MEAS - RV MEAN PG: 0 MMHG
BH CV ECHO MEAS - RV V1 MAX: 49.5 CM/SEC
BH CV ECHO MEAS - RV V1 MEAN: 32.7 CM/SEC
BH CV ECHO MEAS - RV V1 VTI: 8.7 CM
BH CV ECHO MEAS - RVSP: 28 MMHG
BH CV ECHO MEAS - SI(AO): 90.7 ML/M^2
BH CV ECHO MEAS - SI(CUBED): 36.4 ML/M^2
BH CV ECHO MEAS - SI(LVOT): 17.1 ML/M^2
BH CV ECHO MEAS - SI(MOD-SP2): 18 ML/M^2
BH CV ECHO MEAS - SI(MOD-SP4): 20.1 ML/M^2
BH CV ECHO MEAS - SI(TEICH): 33.3 ML/M^2
BH CV ECHO MEAS - SV(AO): 130.8 ML
BH CV ECHO MEAS - SV(CUBED): 52.5 ML
BH CV ECHO MEAS - SV(LVOT): 24.6 ML
BH CV ECHO MEAS - SV(MOD-SP2): 26 ML
BH CV ECHO MEAS - SV(MOD-SP4): 29 ML
BH CV ECHO MEAS - SV(TEICH): 48.1 ML
BH CV ECHO MEAS - TAPSE (>1.6): 2 CM2
BH CV ECHO MEAS - TR MAX PG: 25
BH CV ECHO MEAS - TR MAX VEL: 251 CM/SEC
BH CV ECHO MEASUREMENTS AVERAGE E/E' RATIO: 23.44
BH CV VAS BP RIGHT ARM: NORMAL MMHG
BH CV XLRA - RV BASE: 3.1 CM
BH CV XLRA - TDI S': 10.5 CM/SEC
BILIRUB BLD-MCNC: NEGATIVE MG/DL
BILIRUB SERPL-MCNC: 0.3 MG/DL (ref 0.2–1.2)
BILIRUB SERPL-MCNC: 0.3 MG/DL (ref 0.2–1.2)
BILIRUB SERPL-MCNC: 0.4 MG/DL (ref 0.2–1.2)
BNP SERPL-MCNC: 123.6 PG/ML (ref 0–100)
BUN BLD-MCNC: 13 MG/DL (ref 8–23)
BUN BLD-MCNC: 22 MG/DL (ref 8–23)
BUN BLD-MCNC: 31 MG/DL (ref 8–23)
BUN SERPL-MCNC: 18 MG/DL (ref 8–23)
BUN SERPL-MCNC: 22 MG/DL (ref 8–23)
BUN SERPL-MCNC: 23 MG/DL (ref 8–23)
BUN/CREAT SERPL: 15.7 (ref 7–25)
BUN/CREAT SERPL: 18.2 (ref 7–25)
BUN/CREAT SERPL: 22.7 (ref 7–25)
BUN/CREAT SERPL: 23 (ref 7–25)
BUN/CREAT SERPL: 23.7 (ref 7–25)
BUN/CREAT SERPL: 25 (ref 7–25)
BUN/CREAT SERPL: 26.1 (ref 7–25)
BUN/CREAT SERPL: 32 (ref 7–25)
C PNEUM DNA NPH QL NAA+NON-PROBE: NOT DETECTED
CALCIUM SERPL-MCNC: 8.6 MG/DL (ref 8.2–9.6)
CALCIUM SERPL-MCNC: 9 MG/DL (ref 8.2–9.6)
CALCIUM SERPL-MCNC: 9.7 MG/DL (ref 8.2–9.6)
CALCIUM SPEC-SCNC: 8.4 MG/DL (ref 8.2–9.6)
CALCIUM SPEC-SCNC: 8.5 MG/DL (ref 8.2–9.6)
CALCIUM SPEC-SCNC: 8.7 MG/DL (ref 8.2–9.6)
CALCIUM SPEC-SCNC: 8.9 MG/DL (ref 8.2–9.6)
CALCIUM SPEC-SCNC: 8.9 MG/DL (ref 8.2–9.6)
CHLORIDE SERPL-SCNC: 101 MMOL/L (ref 98–107)
CHLORIDE SERPL-SCNC: 102 MMOL/L (ref 98–107)
CHLORIDE SERPL-SCNC: 102 MMOL/L (ref 98–107)
CHLORIDE SERPL-SCNC: 93 MMOL/L (ref 98–107)
CHLORIDE SERPL-SCNC: 95 MMOL/L (ref 98–107)
CHLORIDE SERPL-SCNC: 98 MMOL/L (ref 98–107)
CHLORIDE SERPL-SCNC: 99 MMOL/L (ref 98–107)
CHLORIDE SERPL-SCNC: 99 MMOL/L (ref 98–107)
CLARITY, POC: ABNORMAL
CO2 SERPL-SCNC: 30.3 MMOL/L (ref 22–29)
CO2 SERPL-SCNC: 30.3 MMOL/L (ref 22–29)
CO2 SERPL-SCNC: 31.3 MMOL/L (ref 22–29)
CO2 SERPL-SCNC: 31.8 MMOL/L (ref 22–29)
CO2 SERPL-SCNC: 32 MMOL/L (ref 22–29)
CO2 SERPL-SCNC: 32.3 MMOL/L (ref 22–29)
CO2 SERPL-SCNC: 32.7 MMOL/L (ref 22–29)
CO2 SERPL-SCNC: 34.1 MMOL/L (ref 22–29)
COLOR UR: YELLOW
CREAT BLD-MCNC: 0.83 MG/DL (ref 0.57–1)
CREAT BLD-MCNC: 0.88 MG/DL (ref 0.57–1)
CREAT BLD-MCNC: 0.97 MG/DL (ref 0.57–1)
CREAT BLD-MCNC: 1.19 MG/DL (ref 0.57–1)
CREAT BLD-MCNC: 1.31 MG/DL (ref 0.57–1)
CREAT SERPL-MCNC: 0.97 MG/DL (ref 0.57–1)
CREAT SERPL-MCNC: 0.99 MG/DL (ref 0.57–1)
CREAT SERPL-MCNC: 1 MG/DL (ref 0.57–1)
DEPRECATED RDW RBC AUTO: 86.2 FL (ref 37–54)
DEPRECATED RDW RBC AUTO: 90 FL (ref 37–54)
DEPRECATED RDW RBC AUTO: 91.6 FL (ref 37–54)
DIFFERENTIAL COMMENT: ABNORMAL
EOSINOPHIL # BLD AUTO: (no result) 10*3/UL
EOSINOPHIL # BLD MANUAL: 0 10*3/MM3 (ref 0–0.4)
EOSINOPHIL # BLD MANUAL: 0.08 10*3/MM3 (ref 0–0.4)
EOSINOPHIL NFR BLD AUTO: (no result) %
EOSINOPHIL NFR BLD MANUAL: 0 % (ref 0.3–6.2)
EOSINOPHIL NFR BLD MANUAL: 1 % (ref 0.3–6.2)
ERYTHROCYTE [DISTWIDTH] IN BLOOD BY AUTOMATED COUNT: 16 % (ref 12.3–15.4)
ERYTHROCYTE [DISTWIDTH] IN BLOOD BY AUTOMATED COUNT: 16.1 % (ref 12.3–15.4)
ERYTHROCYTE [DISTWIDTH] IN BLOOD BY AUTOMATED COUNT: 16.4 % (ref 12.3–15.4)
ERYTHROCYTE [DISTWIDTH] IN BLOOD BY AUTOMATED COUNT: 16.9 % (ref 12.3–15.4)
ERYTHROCYTE [DISTWIDTH] IN BLOOD BY AUTOMATED COUNT: 19.5 % (ref 12.3–15.4)
ERYTHROCYTE [DISTWIDTH] IN BLOOD BY AUTOMATED COUNT: 19.7 % (ref 12.3–15.4)
ERYTHROCYTE [DISTWIDTH] IN BLOOD BY AUTOMATED COUNT: 20 % (ref 12.3–15.4)
FLUAV H1 2009 PAND RNA NPH QL NAA+PROBE: NOT DETECTED
FLUAV H1 HA GENE NPH QL NAA+PROBE: NOT DETECTED
FLUAV H3 RNA NPH QL NAA+PROBE: NOT DETECTED
FLUAV SUBTYP SPEC NAA+PROBE: NOT DETECTED
FLUBV RNA ISLT QL NAA+PROBE: NOT DETECTED
FOLATE SERPL-MCNC: 12 NG/ML (ref 4.78–24.2)
FOLATE SERPL-MCNC: 6.69 NG/ML (ref 4.78–24.2)
FOLATE SERPL-MCNC: 8.14 NG/ML (ref 4.78–24.2)
GFR SERPL CREATININE-BSD FRML MDRD: 38 ML/MIN/1.73
GFR SERPL CREATININE-BSD FRML MDRD: 42 ML/MIN/1.73
GFR SERPL CREATININE-BSD FRML MDRD: 54 ML/MIN/1.73
GFR SERPL CREATININE-BSD FRML MDRD: 60 ML/MIN/1.73
GFR SERPL CREATININE-BSD FRML MDRD: 64 ML/MIN/1.73
GLOBULIN SER CALC-MCNC: 2.3 GM/DL
GLOBULIN SER CALC-MCNC: 2.5 GM/DL
GLOBULIN UR ELPH-MCNC: 2.8 GM/DL
GLUCOSE BLD-MCNC: 116 MG/DL (ref 65–99)
GLUCOSE BLD-MCNC: 77 MG/DL (ref 65–99)
GLUCOSE BLD-MCNC: 87 MG/DL (ref 65–99)
GLUCOSE BLD-MCNC: 88 MG/DL (ref 65–99)
GLUCOSE BLD-MCNC: 91 MG/DL (ref 65–99)
GLUCOSE SERPL-MCNC: 102 MG/DL (ref 65–99)
GLUCOSE SERPL-MCNC: 115 MG/DL (ref 65–99)
GLUCOSE SERPL-MCNC: 85 MG/DL (ref 65–99)
GLUCOSE UR STRIP-MCNC: NEGATIVE MG/DL
HADV DNA SPEC NAA+PROBE: NOT DETECTED
HBA1C MFR BLD: 5 % (ref 4.8–5.6)
HCOV 229E RNA SPEC QL NAA+PROBE: NOT DETECTED
HCOV HKU1 RNA SPEC QL NAA+PROBE: NOT DETECTED
HCOV NL63 RNA SPEC QL NAA+PROBE: NOT DETECTED
HCOV OC43 RNA SPEC QL NAA+PROBE: NOT DETECTED
HCT VFR BLD AUTO: 31.2 % (ref 34–46.6)
HCT VFR BLD AUTO: 32 % (ref 34–46.6)
HCT VFR BLD AUTO: 33.7 % (ref 34–46.6)
HCT VFR BLD AUTO: 36 % (ref 34–46.6)
HCT VFR BLD AUTO: 39.7 % (ref 34–46.6)
HCT VFR BLD AUTO: 39.8 % (ref 34–46.6)
HCT VFR BLD AUTO: 41.1 % (ref 34–46.6)
HGB BLD-MCNC: 10 G/DL (ref 12–15.9)
HGB BLD-MCNC: 10.4 G/DL (ref 12–15.9)
HGB BLD-MCNC: 10.8 G/DL (ref 12–15.9)
HGB BLD-MCNC: 11.8 G/DL (ref 12–15.9)
HGB BLD-MCNC: 11.8 G/DL (ref 12–15.9)
HGB BLD-MCNC: 12 G/DL (ref 12–15.9)
HGB BLD-MCNC: 9.8 G/DL (ref 12–15.9)
HMPV RNA NPH QL NAA+NON-PROBE: NOT DETECTED
HOLD SPECIMEN: NORMAL
HOLD SPECIMEN: NORMAL
HPIV1 RNA SPEC QL NAA+PROBE: NOT DETECTED
HPIV2 RNA SPEC QL NAA+PROBE: NOT DETECTED
HPIV3 RNA NPH QL NAA+PROBE: NOT DETECTED
HPIV4 P GENE NPH QL NAA+PROBE: NOT DETECTED
INR PPP: 1.62 (ref 0.9–1.1)
INR PPP: 1.7 (ref 0.9–1.1)
INR PPP: 1.7 (ref 2–3)
INR PPP: 1.9 (ref 2–3)
INR PPP: 1.9 (ref 2–3)
INR PPP: 2.1 (ref 2–3)
INR PPP: 4.7 (ref 2–3)
INR PPP: 4.8 (ref 0.9–1.1)
INR PPP: 6.25 (ref 0.9–1.1)
INR PPP: 6.4 (ref 0.9–1.1)
IRON SATN MFR SERPL: 17 % (ref 20–50)
IRON SATN MFR SERPL: 24 % (ref 20–50)
IRON SATN MFR SERPL: 25 % (ref 20–50)
IRON SATN MFR SERPL: 39 % (ref 20–50)
IRON SERPL-MCNC: 61 MCG/DL (ref 37–145)
IRON SERPL-MCNC: 73 MCG/DL (ref 37–145)
IRON SERPL-MCNC: 74 MCG/DL (ref 37–145)
IRON SERPL-MCNC: 99 MCG/DL (ref 37–145)
KETONES UR QL: NEGATIVE
LEFT ATRIUM VOLUME INDEX: 43.7 ML/M2
LEUKOCYTE EST, POC: ABNORMAL
LYMPHOCYTES # BLD AUTO: (no result) 10*3/UL
LYMPHOCYTES # BLD MANUAL: 0.34 10*3/MM3 (ref 0.7–3.1)
LYMPHOCYTES # BLD MANUAL: 0.58 10*3/MM3 (ref 0.7–3.1)
LYMPHOCYTES # BLD MANUAL: 0.75 10*3/MM3 (ref 0.7–3.1)
LYMPHOCYTES # BLD MANUAL: 0.9 10*3/MM3 (ref 0.7–3.1)
LYMPHOCYTES # BLD MANUAL: 0.98 10*3/MM3 (ref 0.7–3.1)
LYMPHOCYTES NFR BLD AUTO: (no result) %
LYMPHOCYTES NFR BLD MANUAL: 10 % (ref 19.6–45.3)
LYMPHOCYTES NFR BLD MANUAL: 12.1 % (ref 19.6–45.3)
LYMPHOCYTES NFR BLD MANUAL: 13.3 % (ref 19.6–45.3)
LYMPHOCYTES NFR BLD MANUAL: 2 % (ref 5–12)
LYMPHOCYTES NFR BLD MANUAL: 5 % (ref 19.6–45.3)
LYMPHOCYTES NFR BLD MANUAL: 6.2 % (ref 19.6–45.3)
Lab: NORMAL
M PNEUMO IGG SER IA-ACNC: NOT DETECTED
MACROCYTES BLD QL SMEAR: ABNORMAL
MAXIMAL PREDICTED HEART RATE: 127 BPM
MCH RBC QN AUTO: 34.7 PG (ref 26.6–33)
MCH RBC QN AUTO: 35.8 PG (ref 26.6–33)
MCH RBC QN AUTO: 36.5 PG (ref 26.6–33)
MCH RBC QN AUTO: 36.9 PG (ref 26.6–33)
MCH RBC QN AUTO: 37 PG (ref 26.6–33)
MCH RBC QN AUTO: 37.4 PG (ref 26.6–33)
MCH RBC QN AUTO: 37.5 PG (ref 26.6–33)
MCHC RBC AUTO-ENTMCNC: 29.2 G/DL (ref 31.5–35.7)
MCHC RBC AUTO-ENTMCNC: 29.6 G/DL (ref 31.5–35.7)
MCHC RBC AUTO-ENTMCNC: 29.7 G/DL (ref 31.5–35.7)
MCHC RBC AUTO-ENTMCNC: 29.7 G/DL (ref 31.5–35.7)
MCHC RBC AUTO-ENTMCNC: 30 G/DL (ref 31.5–35.7)
MCHC RBC AUTO-ENTMCNC: 30.6 G/DL (ref 31.5–35.7)
MCHC RBC AUTO-ENTMCNC: 33.3 G/DL (ref 31.5–35.7)
MCV RBC AUTO: 112.6 FL (ref 79–97)
MCV RBC AUTO: 118.8 FL (ref 79–97)
MCV RBC AUTO: 120.6 FL (ref 79–97)
MCV RBC AUTO: 122.1 FL (ref 79–97)
MCV RBC AUTO: 122.9 FL (ref 79–97)
MCV RBC AUTO: 122.9 FL (ref 79–97)
MCV RBC AUTO: 124.8 FL (ref 79–97)
MONOCYTES # BLD AUTO: 0.13 10*3/MM3 (ref 0.1–0.9)
MONOCYTES # BLD MANUAL: 0.08 10*3/MM3 (ref 0.1–0.9)
MONOCYTES # BLD MANUAL: 0.29 10*3/MM3 (ref 0.1–0.9)
MONOCYTES # BLD MANUAL: 0.35 10*3/MM3 (ref 0.1–0.9)
MONOCYTES # BLD MANUAL: 0.52 10*3/MM3 (ref 0.1–0.9)
MONOCYTES NFR BLD AUTO: (no result) %
MONOCYTES NFR BLD MANUAL: 1 % (ref 5–12)
MONOCYTES NFR BLD MANUAL: 3.1 % (ref 5–12)
MONOCYTES NFR BLD MANUAL: 5.1 % (ref 5–12)
MONOCYTES NFR BLD MANUAL: 7 % (ref 5–12)
NEUTROPHILS # BLD AUTO: 6.27 10*3/MM3 (ref 1.7–7)
NEUTROPHILS # BLD MANUAL: 5.52 10*3/MM3 (ref 1.7–7)
NEUTROPHILS # BLD MANUAL: 6.22 10*3/MM3 (ref 1.7–7)
NEUTROPHILS # BLD MANUAL: 6.87 10*3/MM3 (ref 1.4–7)
NEUTROPHILS # BLD MANUAL: 8.46 10*3/MM3 (ref 1.7–7)
NEUTROPHILS NFR BLD AUTO: (no result) %
NEUTROPHILS NFR BLD MANUAL: 81.6 % (ref 42.7–76)
NEUTROPHILS NFR BLD MANUAL: 83 % (ref 42.7–76)
NEUTROPHILS NFR BLD MANUAL: 84.8 % (ref 42.7–76)
NEUTROPHILS NFR BLD MANUAL: 89.7 % (ref 42.7–76)
NEUTROPHILS NFR BLD MANUAL: 93 % (ref 42.7–76)
NITRITE UR-MCNC: NEGATIVE MG/ML
NRBC BLD AUTO-RTO: 0 /100 WBC (ref 0–0.2)
NT-PROBNP SERPL-MCNC: 2653 PG/ML (ref 5–1800)
NT-PROBNP SERPL-MCNC: 3238 PG/ML (ref 5–1800)
NT-PROBNP SERPL-MCNC: 4318 PG/ML (ref 5–1800)
PH UR: 6.5 [PH] (ref 5–8)
PLAT MORPH BLD: NORMAL
PLATELET # BLD AUTO: 230 10*3/MM3 (ref 140–450)
PLATELET # BLD AUTO: 238 10*3/MM3 (ref 140–450)
PLATELET # BLD AUTO: 251 10*3/MM3 (ref 140–450)
PLATELET # BLD AUTO: 282 10*3/MM3 (ref 140–450)
PLATELET # BLD AUTO: 288 10*3/MM3 (ref 140–450)
PLATELET # BLD AUTO: 300 10*3/MM3 (ref 140–450)
PLATELET # BLD AUTO: 410 10*3/MM3 (ref 140–450)
PLATELET BLD QL SMEAR: ABNORMAL
PMV BLD AUTO: 11.1 FL (ref 6–12)
PMV BLD AUTO: 11.3 FL (ref 6–12)
PMV BLD AUTO: 11.4 FL (ref 6–12)
POTASSIUM BLD-SCNC: 4.1 MMOL/L (ref 3.5–5.2)
POTASSIUM BLD-SCNC: 4.4 MMOL/L (ref 3.5–5.2)
POTASSIUM BLD-SCNC: 4.7 MMOL/L (ref 3.5–5.2)
POTASSIUM BLD-SCNC: 4.9 MMOL/L (ref 3.5–5.2)
POTASSIUM BLD-SCNC: 4.9 MMOL/L (ref 3.5–5.2)
POTASSIUM SERPL-SCNC: 4.4 MMOL/L (ref 3.5–5.2)
POTASSIUM SERPL-SCNC: 4.8 MMOL/L (ref 3.5–5.2)
POTASSIUM SERPL-SCNC: 5.6 MMOL/L (ref 3.5–5.2)
PROT SERPL-MCNC: 6.6 G/DL (ref 6–8.5)
PROT SERPL-MCNC: 6.7 G/DL (ref 6–8.5)
PROT SERPL-MCNC: 6.7 G/DL (ref 6–8.5)
PROT UR STRIP-MCNC: NEGATIVE MG/DL
PROTHROMBIN TIME: 18.9 SECONDS (ref 11.7–14.2)
PROTHROMBIN TIME: 55.3 SECONDS (ref 11.7–14.2)
RBC # BLD AUTO: 2.62 10*6/MM3 (ref 3.77–5.28)
RBC # BLD AUTO: 2.7 10*6/MM3 (ref 3.77–5.28)
RBC # BLD AUTO: 2.77 10*6/MM3 (ref 3.77–5.28)
RBC # BLD AUTO: 2.93 10*6/MM3 (ref 3.77–5.28)
RBC # BLD AUTO: 3.23 10*6/MM3 (ref 3.77–5.28)
RBC # BLD AUTO: 3.3 10*6/MM3 (ref 3.77–5.28)
RBC # BLD AUTO: 3.46 10*6/MM3 (ref 3.77–5.28)
RBC # UR STRIP: ABNORMAL /UL
RBC MORPH BLD: ABNORMAL
RHINOVIRUS RNA SPEC NAA+PROBE: NOT DETECTED
RSV RNA NPH QL NAA+NON-PROBE: NOT DETECTED
SODIUM BLD-SCNC: 138 MMOL/L (ref 136–145)
SODIUM BLD-SCNC: 140 MMOL/L (ref 136–145)
SODIUM BLD-SCNC: 140 MMOL/L (ref 136–145)
SODIUM BLD-SCNC: 141 MMOL/L (ref 136–145)
SODIUM BLD-SCNC: 143 MMOL/L (ref 136–145)
SODIUM SERPL-SCNC: 138 MMOL/L (ref 136–145)
SODIUM SERPL-SCNC: 142 MMOL/L (ref 136–145)
SODIUM SERPL-SCNC: 145 MMOL/L (ref 136–145)
SP GR UR: 1.01 (ref 1–1.03)
SPHEROCYTES BLD QL SMEAR: ABNORMAL
STRESS TARGET HR: 108 BPM
T4 FREE SERPL-MCNC: 0.82 NG/DL (ref 0.93–1.7)
T4 FREE SERPL-MCNC: 1.03 NG/DL (ref 0.93–1.7)
T4 FREE SERPL-MCNC: 1.21 NG/DL (ref 0.93–1.7)
TIBC SERPL-MCNC: 254 MCG/DL
TIBC SERPL-MCNC: 299 MCG/DL
TIBC SERPL-MCNC: 304 MCG/DL
TIBC SERPL-MCNC: 367 MCG/DL
TROPONIN T SERPL-MCNC: <0.01 NG/ML (ref 0–0.03)
TSH SERPL DL<=0.005 MIU/L-ACNC: 0.94 MIU/ML (ref 0.27–4.2)
TSH SERPL DL<=0.005 MIU/L-ACNC: 11 MIU/ML (ref 0.27–4.2)
TSH SERPL DL<=0.005 MIU/L-ACNC: 7.66 MIU/ML (ref 0.27–4.2)
UIBC SERPL-MCNC: 155 MCG/DL (ref 112–346)
UIBC SERPL-MCNC: 225 MCG/DL (ref 112–346)
UIBC SERPL-MCNC: 231 MCG/DL (ref 112–346)
UIBC SERPL-MCNC: 306 MCG/DL
UROBILINOGEN UR QL: NORMAL
VIT B12 SERPL-MCNC: 1997 PG/ML (ref 211–946)
VIT B12 SERPL-MCNC: 462 PG/ML (ref 211–946)
VIT B12 SERPL-MCNC: 765 PG/ML (ref 211–946)
WBC # BLD AUTO: 6.77 10*3/MM3 (ref 3.4–10.8)
WBC # BLD AUTO: 7.49 10*3/MM3 (ref 3.4–10.8)
WBC # BLD AUTO: 8.1 10*3/MM3 (ref 3.4–10.8)
WBC # BLD AUTO: 9.43 10*3/MM3 (ref 3.4–10.8)
WBC MORPH BLD: NORMAL
WBC NRBC COR # BLD: 6.74 10*3/MM3 (ref 3.4–10.8)
WBC NRBC COR # BLD: 7.29 10*3/MM3 (ref 3.4–10.8)
WBC NRBC COR # BLD: 7.9 10*3/MM3 (ref 3.4–10.8)
WHOLE BLOOD HOLD SPECIMEN: NORMAL
WHOLE BLOOD HOLD SPECIMEN: NORMAL
WRITTEN AUTHORIZATION: NORMAL

## 2019-01-01 PROCEDURE — 85610 PROTHROMBIN TIME: CPT | Performed by: INTERNAL MEDICINE

## 2019-01-01 PROCEDURE — 36416 COLLJ CAPILLARY BLOOD SPEC: CPT | Performed by: INTERNAL MEDICINE

## 2019-01-01 PROCEDURE — 99213 OFFICE O/P EST LOW 20 MIN: CPT | Performed by: INTERNAL MEDICINE

## 2019-01-01 PROCEDURE — 80048 BASIC METABOLIC PNL TOTAL CA: CPT | Performed by: INTERNAL MEDICINE

## 2019-01-01 PROCEDURE — 93005 ELECTROCARDIOGRAM TRACING: CPT | Performed by: EMERGENCY MEDICINE

## 2019-01-01 PROCEDURE — 25010000002 ONDANSETRON PER 1 MG: Performed by: HOSPITALIST

## 2019-01-01 PROCEDURE — 97110 THERAPEUTIC EXERCISES: CPT

## 2019-01-01 PROCEDURE — 94799 UNLISTED PULMONARY SVC/PX: CPT

## 2019-01-01 PROCEDURE — 63710000001 DIPHENHYDRAMINE PER 50 MG: Performed by: INTERNAL MEDICINE

## 2019-01-01 PROCEDURE — 99214 OFFICE O/P EST MOD 30 MIN: CPT | Performed by: INTERNAL MEDICINE

## 2019-01-01 PROCEDURE — 93279 PRGRMG DEV EVAL PM/LDLS PM: CPT | Performed by: INTERNAL MEDICINE

## 2019-01-01 PROCEDURE — 71046 X-RAY EXAM CHEST 2 VIEWS: CPT

## 2019-01-01 PROCEDURE — 83880 ASSAY OF NATRIURETIC PEPTIDE: CPT | Performed by: EMERGENCY MEDICINE

## 2019-01-01 PROCEDURE — 85027 COMPLETE CBC AUTOMATED: CPT | Performed by: INTERNAL MEDICINE

## 2019-01-01 PROCEDURE — 99204 OFFICE O/P NEW MOD 45 MIN: CPT | Performed by: INTERNAL MEDICINE

## 2019-01-01 PROCEDURE — 93793 ANTICOAG MGMT PT WARFARIN: CPT | Performed by: INTERNAL MEDICINE

## 2019-01-01 PROCEDURE — 25010000002 ENOXAPARIN PER 10 MG: Performed by: HOSPITALIST

## 2019-01-01 PROCEDURE — 80053 COMPREHEN METABOLIC PANEL: CPT | Performed by: EMERGENCY MEDICINE

## 2019-01-01 PROCEDURE — 72072 X-RAY EXAM THORAC SPINE 3VWS: CPT

## 2019-01-01 PROCEDURE — 81003 URINALYSIS AUTO W/O SCOPE: CPT | Performed by: NURSE PRACTITIONER

## 2019-01-01 PROCEDURE — 94618 PULMONARY STRESS TESTING: CPT

## 2019-01-01 PROCEDURE — 93000 ELECTROCARDIOGRAM COMPLETE: CPT | Performed by: INTERNAL MEDICINE

## 2019-01-01 PROCEDURE — 85025 COMPLETE CBC W/AUTO DIFF WBC: CPT | Performed by: EMERGENCY MEDICINE

## 2019-01-01 PROCEDURE — 93010 ELECTROCARDIOGRAM REPORT: CPT | Performed by: INTERNAL MEDICINE

## 2019-01-01 PROCEDURE — 83880 ASSAY OF NATRIURETIC PEPTIDE: CPT | Performed by: INTERNAL MEDICINE

## 2019-01-01 PROCEDURE — 99284 EMERGENCY DEPT VISIT MOD MDM: CPT

## 2019-01-01 PROCEDURE — 72100 X-RAY EXAM L-S SPINE 2/3 VWS: CPT

## 2019-01-01 PROCEDURE — 90653 IIV ADJUVANT VACCINE IM: CPT | Performed by: INTERNAL MEDICINE

## 2019-01-01 PROCEDURE — 80048 BASIC METABOLIC PNL TOTAL CA: CPT | Performed by: HOSPITALIST

## 2019-01-01 PROCEDURE — 0100U HC BIOFIRE FILMARRAY RESP PANEL 2: CPT | Performed by: INTERNAL MEDICINE

## 2019-01-01 PROCEDURE — 99213 OFFICE O/P EST LOW 20 MIN: CPT | Performed by: NURSE PRACTITIONER

## 2019-01-01 PROCEDURE — 83880 ASSAY OF NATRIURETIC PEPTIDE: CPT | Performed by: HOSPITALIST

## 2019-01-01 PROCEDURE — G0008 ADMIN INFLUENZA VIRUS VAC: HCPCS | Performed by: INTERNAL MEDICINE

## 2019-01-01 PROCEDURE — 25010000002 FUROSEMIDE PER 20 MG: Performed by: INTERNAL MEDICINE

## 2019-01-01 PROCEDURE — 25010000002 FUROSEMIDE PER 20 MG: Performed by: EMERGENCY MEDICINE

## 2019-01-01 PROCEDURE — 25010000002 METHYLPREDNISOLONE PER 125 MG: Performed by: EMERGENCY MEDICINE

## 2019-01-01 PROCEDURE — 93306 TTE W/DOPPLER COMPLETE: CPT | Performed by: INTERNAL MEDICINE

## 2019-01-01 PROCEDURE — 97161 PT EVAL LOW COMPLEX 20 MIN: CPT

## 2019-01-01 PROCEDURE — 84484 ASSAY OF TROPONIN QUANT: CPT | Performed by: EMERGENCY MEDICINE

## 2019-01-01 PROCEDURE — 85007 BL SMEAR W/DIFF WBC COUNT: CPT | Performed by: EMERGENCY MEDICINE

## 2019-01-01 PROCEDURE — 85027 COMPLETE CBC AUTOMATED: CPT | Performed by: HOSPITALIST

## 2019-01-01 PROCEDURE — 93306 TTE W/DOPPLER COMPLETE: CPT

## 2019-01-01 PROCEDURE — 93005 ELECTROCARDIOGRAM TRACING: CPT

## 2019-01-01 PROCEDURE — 94640 AIRWAY INHALATION TREATMENT: CPT

## 2019-01-01 RX ORDER — BUMETANIDE 0.5 MG/1
0.5 TABLET ORAL DAILY
Status: DISCONTINUED | OUTPATIENT
Start: 2019-01-01 | End: 2019-01-01

## 2019-01-01 RX ORDER — ACETAMINOPHEN 325 MG/1
650 TABLET ORAL EVERY 4 HOURS PRN
Status: DISCONTINUED | OUTPATIENT
Start: 2019-01-01 | End: 2019-01-01 | Stop reason: HOSPADM

## 2019-01-01 RX ORDER — ONDANSETRON 4 MG/1
4 TABLET, FILM COATED ORAL EVERY 6 HOURS PRN
Status: DISCONTINUED | OUTPATIENT
Start: 2019-01-01 | End: 2019-01-01 | Stop reason: HOSPADM

## 2019-01-01 RX ORDER — WARFARIN SODIUM 2 MG/1
TABLET ORAL
Qty: 30 TABLET | Refills: 5 | Status: SHIPPED | OUTPATIENT
Start: 2019-01-01 | End: 2019-01-01

## 2019-01-01 RX ORDER — WARFARIN SODIUM 2.5 MG/1
TABLET ORAL
Qty: 30 TABLET | Refills: 5 | Status: SHIPPED | OUTPATIENT
Start: 2019-01-01 | End: 2019-01-01

## 2019-01-01 RX ORDER — IPRATROPIUM BROMIDE AND ALBUTEROL SULFATE 2.5; .5 MG/3ML; MG/3ML
3 SOLUTION RESPIRATORY (INHALATION) EVERY 4 HOURS PRN
Status: DISCONTINUED | OUTPATIENT
Start: 2019-01-01 | End: 2019-01-01 | Stop reason: HOSPADM

## 2019-01-01 RX ORDER — FLUCONAZOLE 150 MG/1
150 TABLET ORAL ONCE
Qty: 1 TABLET | Refills: 0 | Status: SHIPPED | OUTPATIENT
Start: 2019-01-01 | End: 2019-01-01

## 2019-01-01 RX ORDER — LISINOPRIL 5 MG/1
5 TABLET ORAL DAILY
Qty: 90 TABLET | Refills: 1 | Status: SHIPPED | OUTPATIENT
Start: 2019-01-01 | End: 2019-01-01 | Stop reason: SDUPTHER

## 2019-01-01 RX ORDER — ANTIOX #8/OM3/DHA/EPA/LUT/ZEAX 250-2.5 MG
1 CAPSULE ORAL 2 TIMES DAILY
Status: ON HOLD | COMMUNITY
End: 2020-01-01

## 2019-01-01 RX ORDER — IPRATROPIUM BROMIDE AND ALBUTEROL SULFATE 2.5; .5 MG/3ML; MG/3ML
3 SOLUTION RESPIRATORY (INHALATION) ONCE
Status: COMPLETED | OUTPATIENT
Start: 2019-01-01 | End: 2019-01-01

## 2019-01-01 RX ORDER — PAROXETINE HYDROCHLORIDE 20 MG/1
20 TABLET, FILM COATED ORAL DAILY
Qty: 90 TABLET | Refills: 1 | Status: SHIPPED | OUTPATIENT
Start: 2019-01-01 | End: 2019-01-01 | Stop reason: SDUPTHER

## 2019-01-01 RX ORDER — LORAZEPAM 0.5 MG/1
0.25 TABLET ORAL ONCE
Status: COMPLETED | OUTPATIENT
Start: 2019-01-01 | End: 2019-01-01

## 2019-01-01 RX ORDER — CARVEDILOL 6.25 MG/1
6.25 TABLET ORAL 2 TIMES DAILY WITH MEALS
COMMUNITY
End: 2019-01-01 | Stop reason: SDUPTHER

## 2019-01-01 RX ORDER — BUMETANIDE 0.5 MG/1
1 TABLET ORAL DAILY
Qty: 180 TABLET | Refills: 1 | OUTPATIENT
Start: 2019-01-01

## 2019-01-01 RX ORDER — SULFAMETHOXAZOLE AND TRIMETHOPRIM 800; 160 MG/1; MG/1
1 TABLET ORAL 2 TIMES DAILY
Qty: 14 TABLET | Refills: 0 | Status: SHIPPED | OUTPATIENT
Start: 2019-01-01 | End: 2019-01-01

## 2019-01-01 RX ORDER — NITROGLYCERIN 0.4 MG/1
0.4 TABLET SUBLINGUAL
Status: DISCONTINUED | OUTPATIENT
Start: 2019-01-01 | End: 2019-01-01 | Stop reason: HOSPADM

## 2019-01-01 RX ORDER — DIPHENHYDRAMINE HCL 25 MG
25 CAPSULE ORAL NIGHTLY PRN
Status: DISCONTINUED | OUTPATIENT
Start: 2019-01-01 | End: 2019-01-01 | Stop reason: HOSPADM

## 2019-01-01 RX ORDER — WARFARIN SODIUM 3 MG/1
4.5 TABLET ORAL
Qty: 90 TABLET | Refills: 3 | Status: SHIPPED | OUTPATIENT
Start: 2019-01-01 | End: 2019-01-01 | Stop reason: DRUGHIGH

## 2019-01-01 RX ORDER — BUMETANIDE 0.5 MG/1
0.5 TABLET ORAL DAILY
Qty: 30 TABLET | Refills: 0 | Status: SHIPPED | OUTPATIENT
Start: 2019-01-01 | End: 2019-01-01 | Stop reason: SDUPTHER

## 2019-01-01 RX ORDER — BUMETANIDE 0.5 MG/1
0.5 TABLET ORAL ONCE
Status: COMPLETED | OUTPATIENT
Start: 2019-01-01 | End: 2019-01-01

## 2019-01-01 RX ORDER — WARFARIN SODIUM 5 MG/1
TABLET ORAL
Qty: 30 TABLET | Refills: 6 | Status: SHIPPED | OUTPATIENT
Start: 2019-01-01 | End: 2019-01-01

## 2019-01-01 RX ORDER — ALBUTEROL SULFATE 2.5 MG/3ML
2.5 SOLUTION RESPIRATORY (INHALATION)
Status: COMPLETED | OUTPATIENT
Start: 2019-01-01 | End: 2019-01-01

## 2019-01-01 RX ORDER — LEVOTHYROXINE SODIUM 0.07 MG/1
75 TABLET ORAL DAILY
Qty: 90 TABLET | Refills: 1 | Status: SHIPPED | OUTPATIENT
Start: 2019-01-01 | End: 2019-01-01 | Stop reason: DRUGHIGH

## 2019-01-01 RX ORDER — BUMETANIDE 0.5 MG/1
0.5 TABLET ORAL DAILY
Qty: 30 TABLET | Refills: 2 | Status: SHIPPED | OUTPATIENT
Start: 2019-01-01 | End: 2019-01-01

## 2019-01-01 RX ORDER — LEVOTHYROXINE SODIUM 0.07 MG/1
TABLET ORAL
Qty: 90 TABLET | Refills: 1 | Status: SHIPPED | OUTPATIENT
Start: 2019-01-01 | End: 2020-01-01 | Stop reason: SDUPTHER

## 2019-01-01 RX ORDER — FUROSEMIDE 10 MG/ML
20 INJECTION INTRAMUSCULAR; INTRAVENOUS ONCE
Status: COMPLETED | OUTPATIENT
Start: 2019-01-01 | End: 2019-01-01

## 2019-01-01 RX ORDER — CARVEDILOL 6.25 MG/1
TABLET ORAL
Qty: 180 TABLET | Refills: 1 | Status: SHIPPED | OUTPATIENT
Start: 2019-01-01

## 2019-01-01 RX ORDER — POTASSIUM CHLORIDE 750 MG/1
10 CAPSULE, EXTENDED RELEASE ORAL DAILY
Status: DISCONTINUED | OUTPATIENT
Start: 2019-01-01 | End: 2019-01-01 | Stop reason: HOSPADM

## 2019-01-01 RX ORDER — SULFAMETHOXAZOLE AND TRIMETHOPRIM 800; 160 MG/1; MG/1
1 TABLET ORAL 2 TIMES DAILY
Qty: 10 TABLET | Refills: 0 | Status: SHIPPED | OUTPATIENT
Start: 2019-01-01 | End: 2019-01-01

## 2019-01-01 RX ORDER — PAROXETINE HYDROCHLORIDE 20 MG/1
20 TABLET, FILM COATED ORAL DAILY
Status: DISCONTINUED | OUTPATIENT
Start: 2019-01-01 | End: 2019-01-01 | Stop reason: HOSPADM

## 2019-01-01 RX ORDER — WARFARIN SODIUM 5 MG/1
5 TABLET ORAL NIGHTLY
Qty: 30 TABLET | Refills: 6 | Status: SHIPPED | OUTPATIENT
Start: 2019-01-01 | End: 2019-01-01

## 2019-01-01 RX ORDER — BUMETANIDE 0.5 MG/1
1 TABLET ORAL DAILY
Qty: 180 TABLET | Refills: 1 | Status: SHIPPED | OUTPATIENT
Start: 2019-01-01 | End: 2019-01-01 | Stop reason: ALTCHOICE

## 2019-01-01 RX ORDER — SODIUM CHLORIDE 0.9 % (FLUSH) 0.9 %
10 SYRINGE (ML) INJECTION AS NEEDED
Status: DISCONTINUED | OUTPATIENT
Start: 2019-01-01 | End: 2019-01-01

## 2019-01-01 RX ORDER — POTASSIUM CHLORIDE 750 MG/1
10 CAPSULE, EXTENDED RELEASE ORAL DAILY
Qty: 30 CAPSULE | Refills: 0 | Status: SHIPPED | OUTPATIENT
Start: 2019-01-01 | End: 2019-01-01 | Stop reason: SDUPTHER

## 2019-01-01 RX ORDER — ACETAMINOPHEN 160 MG/5ML
650 SOLUTION ORAL EVERY 4 HOURS PRN
Status: DISCONTINUED | OUTPATIENT
Start: 2019-01-01 | End: 2019-01-01 | Stop reason: HOSPADM

## 2019-01-01 RX ORDER — TRAZODONE HYDROCHLORIDE 50 MG/1
25 TABLET ORAL NIGHTLY
Qty: 30 TABLET | Refills: 3 | Status: SHIPPED | OUTPATIENT
Start: 2019-01-01 | End: 2019-01-01

## 2019-01-01 RX ORDER — IPRATROPIUM BROMIDE AND ALBUTEROL SULFATE 2.5; .5 MG/3ML; MG/3ML
3 SOLUTION RESPIRATORY (INHALATION)
Status: DISCONTINUED | OUTPATIENT
Start: 2019-01-01 | End: 2019-01-01 | Stop reason: HOSPADM

## 2019-01-01 RX ORDER — UBIDECARENONE 75 MG
50 CAPSULE ORAL DAILY
COMMUNITY
End: 2019-01-01 | Stop reason: DRUGHIGH

## 2019-01-01 RX ORDER — ACETAMINOPHEN 500 MG
1000 TABLET ORAL 2 TIMES DAILY PRN
Status: ON HOLD | COMMUNITY
End: 2020-01-01

## 2019-01-01 RX ORDER — AMOXICILLIN AND CLAVULANATE POTASSIUM 500; 125 MG/1; MG/1
1 TABLET, FILM COATED ORAL 2 TIMES DAILY
Qty: 14 TABLET | Refills: 0 | Status: SHIPPED | OUTPATIENT
Start: 2019-01-01 | End: 2019-01-01

## 2019-01-01 RX ORDER — LEVOTHYROXINE SODIUM 0.07 MG/1
75 TABLET ORAL DAILY
Status: DISCONTINUED | OUTPATIENT
Start: 2019-01-01 | End: 2019-01-01 | Stop reason: HOSPADM

## 2019-01-01 RX ORDER — CARVEDILOL 6.25 MG/1
6.25 TABLET ORAL 2 TIMES DAILY WITH MEALS
Status: DISCONTINUED | OUTPATIENT
Start: 2019-01-01 | End: 2019-01-01 | Stop reason: HOSPADM

## 2019-01-01 RX ORDER — LISINOPRIL 5 MG/1
5 TABLET ORAL DAILY
Status: DISCONTINUED | OUTPATIENT
Start: 2019-01-01 | End: 2019-01-01 | Stop reason: HOSPADM

## 2019-01-01 RX ORDER — METHYLPREDNISOLONE SODIUM SUCCINATE 125 MG/2ML
125 INJECTION, POWDER, LYOPHILIZED, FOR SOLUTION INTRAMUSCULAR; INTRAVENOUS ONCE
Status: COMPLETED | OUTPATIENT
Start: 2019-01-01 | End: 2019-01-01

## 2019-01-01 RX ORDER — PAROXETINE HYDROCHLORIDE 20 MG/1
TABLET, FILM COATED ORAL
Qty: 90 TABLET | Refills: 1 | Status: SHIPPED | OUTPATIENT
Start: 2019-01-01

## 2019-01-01 RX ORDER — LEVOTHYROXINE SODIUM 0.07 MG/1
75 TABLET ORAL DAILY
COMMUNITY
End: 2019-01-01 | Stop reason: SDUPTHER

## 2019-01-01 RX ORDER — BUMETANIDE 1 MG/1
1 TABLET ORAL DAILY
Status: DISCONTINUED | OUTPATIENT
Start: 2019-01-01 | End: 2019-01-01 | Stop reason: HOSPADM

## 2019-01-01 RX ORDER — CHOLECALCIFEROL (VITAMIN D3) 125 MCG
5 CAPSULE ORAL NIGHTLY
Status: DISCONTINUED | OUTPATIENT
Start: 2019-01-01 | End: 2019-01-01 | Stop reason: HOSPADM

## 2019-01-01 RX ORDER — LISINOPRIL 5 MG/1
5 TABLET ORAL DAILY
Qty: 90 TABLET | Refills: 1 | Status: SHIPPED | OUTPATIENT
Start: 2019-01-01

## 2019-01-01 RX ORDER — ACETAMINOPHEN 650 MG/1
650 SUPPOSITORY RECTAL EVERY 4 HOURS PRN
Status: DISCONTINUED | OUTPATIENT
Start: 2019-01-01 | End: 2019-01-01 | Stop reason: HOSPADM

## 2019-01-01 RX ORDER — LISINOPRIL 5 MG/1
TABLET ORAL
Qty: 90 TABLET | Refills: 1 | OUTPATIENT
Start: 2019-01-01

## 2019-01-01 RX ORDER — PAROXETINE HYDROCHLORIDE 20 MG/1
TABLET, FILM COATED ORAL
Qty: 90 TABLET | Refills: 1 | OUTPATIENT
Start: 2019-01-01

## 2019-01-01 RX ORDER — LEVOTHYROXINE SODIUM 0.1 MG/1
100 TABLET ORAL DAILY
Qty: 30 TABLET | Refills: 1 | Status: SHIPPED | OUTPATIENT
Start: 2019-01-01 | End: 2019-01-01 | Stop reason: DRUGHIGH

## 2019-01-01 RX ORDER — FUROSEMIDE 10 MG/ML
40 INJECTION INTRAMUSCULAR; INTRAVENOUS EVERY 8 HOURS
Status: COMPLETED | OUTPATIENT
Start: 2019-01-01 | End: 2019-01-01

## 2019-01-01 RX ORDER — ONDANSETRON 2 MG/ML
4 INJECTION INTRAMUSCULAR; INTRAVENOUS EVERY 6 HOURS PRN
Status: DISCONTINUED | OUTPATIENT
Start: 2019-01-01 | End: 2019-01-01 | Stop reason: HOSPADM

## 2019-01-01 RX ORDER — LEVOTHYROXINE SODIUM 0.07 MG/1
TABLET ORAL
Qty: 90 TABLET | Refills: 1 | OUTPATIENT
Start: 2019-01-01

## 2019-01-01 RX ORDER — ALBUTEROL SULFATE 1.25 MG/3ML
1 SOLUTION RESPIRATORY (INHALATION) EVERY 4 HOURS PRN
Qty: 90 VIAL | Refills: 2 | Status: SHIPPED | OUTPATIENT
Start: 2019-01-01 | End: 2019-01-01 | Stop reason: SDUPTHER

## 2019-01-01 RX ORDER — POTASSIUM CHLORIDE 750 MG/1
10 CAPSULE, EXTENDED RELEASE ORAL DAILY
Qty: 30 CAPSULE | Refills: 2 | Status: SHIPPED | OUTPATIENT
Start: 2019-01-01 | End: 2019-01-01

## 2019-01-01 RX ORDER — BUMETANIDE 1 MG/1
1 TABLET ORAL DAILY
Status: DISCONTINUED | OUTPATIENT
Start: 2019-01-01 | End: 2019-01-01

## 2019-01-01 RX ORDER — ALBUTEROL SULFATE 1.25 MG/3ML
1 SOLUTION RESPIRATORY (INHALATION) EVERY 4 HOURS PRN
Qty: 90 VIAL | Refills: 2 | Status: SHIPPED | OUTPATIENT
Start: 2019-01-01 | End: 2020-01-01 | Stop reason: SDUPTHER

## 2019-01-01 RX ADMIN — CARVEDILOL 6.25 MG: 6.25 TABLET, FILM COATED ORAL at 18:04

## 2019-01-01 RX ADMIN — DIPHENHYDRAMINE HYDROCHLORIDE 25 MG: 25 CAPSULE ORAL at 19:14

## 2019-01-01 RX ADMIN — IPRATROPIUM BROMIDE AND ALBUTEROL SULFATE 3 ML: 2.5; .5 SOLUTION RESPIRATORY (INHALATION) at 15:30

## 2019-01-01 RX ADMIN — IPRATROPIUM BROMIDE AND ALBUTEROL SULFATE 3 ML: 2.5; .5 SOLUTION RESPIRATORY (INHALATION) at 20:51

## 2019-01-01 RX ADMIN — PAROXETINE 20 MG: 20 TABLET, FILM COATED ORAL at 09:57

## 2019-01-01 RX ADMIN — DIPHENHYDRAMINE HYDROCHLORIDE 25 MG: 25 CAPSULE ORAL at 19:50

## 2019-01-01 RX ADMIN — CARVEDILOL 6.25 MG: 6.25 TABLET, FILM COATED ORAL at 08:14

## 2019-01-01 RX ADMIN — BUMETANIDE 0.5 MG: 0.5 TABLET ORAL at 09:57

## 2019-01-01 RX ADMIN — LISINOPRIL 5 MG: 5 TABLET ORAL at 09:40

## 2019-01-01 RX ADMIN — LISINOPRIL 5 MG: 5 TABLET ORAL at 08:14

## 2019-01-01 RX ADMIN — PAROXETINE 20 MG: 20 TABLET, FILM COATED ORAL at 08:13

## 2019-01-01 RX ADMIN — PAROXETINE 20 MG: 20 TABLET, FILM COATED ORAL at 09:29

## 2019-01-01 RX ADMIN — ENOXAPARIN SODIUM 40 MG: 40 INJECTION SUBCUTANEOUS at 09:57

## 2019-01-01 RX ADMIN — IPRATROPIUM BROMIDE AND ALBUTEROL SULFATE 3 ML: 2.5; .5 SOLUTION RESPIRATORY (INHALATION) at 22:57

## 2019-01-01 RX ADMIN — LEVOTHYROXINE SODIUM 75 MCG: 75 TABLET ORAL at 08:14

## 2019-01-01 RX ADMIN — LEVOTHYROXINE SODIUM 75 MCG: 75 TABLET ORAL at 09:29

## 2019-01-01 RX ADMIN — BUMETANIDE 1 MG: 1 TABLET ORAL at 09:29

## 2019-01-01 RX ADMIN — CARVEDILOL 6.25 MG: 6.25 TABLET, FILM COATED ORAL at 09:57

## 2019-01-01 RX ADMIN — ENOXAPARIN SODIUM 40 MG: 40 INJECTION SUBCUTANEOUS at 09:40

## 2019-01-01 RX ADMIN — LEVOTHYROXINE SODIUM 75 MCG: 75 TABLET ORAL at 09:57

## 2019-01-01 RX ADMIN — IPRATROPIUM BROMIDE AND ALBUTEROL SULFATE 3 ML: 2.5; .5 SOLUTION RESPIRATORY (INHALATION) at 21:00

## 2019-01-01 RX ADMIN — FUROSEMIDE 20 MG: 10 INJECTION, SOLUTION INTRAMUSCULAR; INTRAVENOUS at 14:55

## 2019-01-01 RX ADMIN — ACETAMINOPHEN 650 MG: 325 TABLET, FILM COATED ORAL at 09:40

## 2019-01-01 RX ADMIN — CARVEDILOL 6.25 MG: 6.25 TABLET, FILM COATED ORAL at 18:16

## 2019-01-01 RX ADMIN — IPRATROPIUM BROMIDE AND ALBUTEROL SULFATE 3 ML: 2.5; .5 SOLUTION RESPIRATORY (INHALATION) at 15:20

## 2019-01-01 RX ADMIN — FUROSEMIDE 40 MG: 10 INJECTION, SOLUTION INTRAMUSCULAR; INTRAVENOUS at 22:36

## 2019-01-01 RX ADMIN — ENOXAPARIN SODIUM 40 MG: 40 INJECTION SUBCUTANEOUS at 11:19

## 2019-01-01 RX ADMIN — POTASSIUM CHLORIDE 10 MEQ: 750 CAPSULE, EXTENDED RELEASE ORAL at 09:29

## 2019-01-01 RX ADMIN — POTASSIUM CHLORIDE 10 MEQ: 750 CAPSULE, EXTENDED RELEASE ORAL at 09:57

## 2019-01-01 RX ADMIN — BUMETANIDE 1 MG: 1 TABLET ORAL at 08:13

## 2019-01-01 RX ADMIN — ALBUTEROL SULFATE 2.5 MG: 2.5 SOLUTION RESPIRATORY (INHALATION) at 14:57

## 2019-01-01 RX ADMIN — IPRATROPIUM BROMIDE AND ALBUTEROL SULFATE 3 ML: 2.5; .5 SOLUTION RESPIRATORY (INHALATION) at 08:27

## 2019-01-01 RX ADMIN — CARVEDILOL 6.25 MG: 6.25 TABLET, FILM COATED ORAL at 23:06

## 2019-01-01 RX ADMIN — METHYLPREDNISOLONE SODIUM SUCCINATE 125 MG: 125 INJECTION, POWDER, FOR SOLUTION INTRAMUSCULAR; INTRAVENOUS at 14:55

## 2019-01-01 RX ADMIN — CARVEDILOL 6.25 MG: 6.25 TABLET, FILM COATED ORAL at 09:29

## 2019-01-01 RX ADMIN — IPRATROPIUM BROMIDE AND ALBUTEROL SULFATE 3 ML: 2.5; .5 SOLUTION RESPIRATORY (INHALATION) at 07:02

## 2019-01-01 RX ADMIN — PAROXETINE 20 MG: 20 TABLET, FILM COATED ORAL at 09:40

## 2019-01-01 RX ADMIN — LEVOTHYROXINE SODIUM 75 MCG: 75 TABLET ORAL at 09:40

## 2019-01-01 RX ADMIN — FUROSEMIDE 40 MG: 10 INJECTION, SOLUTION INTRAMUSCULAR; INTRAVENOUS at 15:14

## 2019-01-01 RX ADMIN — CARVEDILOL 6.25 MG: 6.25 TABLET, FILM COATED ORAL at 09:40

## 2019-01-01 RX ADMIN — BUMETANIDE 0.5 MG: 0.5 TABLET ORAL at 17:29

## 2019-01-01 RX ADMIN — POTASSIUM CHLORIDE 10 MEQ: 750 CAPSULE, EXTENDED RELEASE ORAL at 09:40

## 2019-01-01 RX ADMIN — IPRATROPIUM BROMIDE AND ALBUTEROL SULFATE 3 ML: 2.5; .5 SOLUTION RESPIRATORY (INHALATION) at 14:46

## 2019-01-01 RX ADMIN — DIPHENHYDRAMINE HYDROCHLORIDE 25 MG: 25 CAPSULE ORAL at 22:24

## 2019-01-01 RX ADMIN — Medication 5 MG: at 19:14

## 2019-01-01 RX ADMIN — LISINOPRIL 5 MG: 5 TABLET ORAL at 09:57

## 2019-01-01 RX ADMIN — IPRATROPIUM BROMIDE AND ALBUTEROL SULFATE 3 ML: 2.5; .5 SOLUTION RESPIRATORY (INHALATION) at 11:55

## 2019-01-01 RX ADMIN — IPRATROPIUM BROMIDE AND ALBUTEROL SULFATE 3 ML: 2.5; .5 SOLUTION RESPIRATORY (INHALATION) at 15:16

## 2019-01-01 RX ADMIN — IPRATROPIUM BROMIDE AND ALBUTEROL SULFATE 3 ML: 2.5; .5 SOLUTION RESPIRATORY (INHALATION) at 07:25

## 2019-01-01 RX ADMIN — CARVEDILOL 6.25 MG: 6.25 TABLET, FILM COATED ORAL at 17:29

## 2019-01-01 RX ADMIN — ALBUTEROL SULFATE 2.5 MG: 2.5 SOLUTION RESPIRATORY (INHALATION) at 15:43

## 2019-01-01 RX ADMIN — LORAZEPAM 0.25 MG: 0.5 TABLET ORAL at 21:21

## 2019-01-01 RX ADMIN — BUMETANIDE 0.5 MG: 0.5 TABLET ORAL at 09:40

## 2019-01-01 RX ADMIN — ONDANSETRON 4 MG: 2 INJECTION INTRAMUSCULAR; INTRAVENOUS at 10:08

## 2019-01-01 RX ADMIN — POTASSIUM CHLORIDE 10 MEQ: 750 CAPSULE, EXTENDED RELEASE ORAL at 08:14

## 2019-01-01 RX ADMIN — LISINOPRIL 5 MG: 5 TABLET ORAL at 09:30

## 2019-01-01 RX ADMIN — IPRATROPIUM BROMIDE AND ALBUTEROL SULFATE 3 ML: 2.5; .5 SOLUTION RESPIRATORY (INHALATION) at 07:58

## 2019-01-01 RX ADMIN — ENOXAPARIN SODIUM 40 MG: 40 INJECTION SUBCUTANEOUS at 08:14

## 2019-01-01 RX ADMIN — IPRATROPIUM BROMIDE AND ALBUTEROL SULFATE 3 ML: 2.5; .5 SOLUTION RESPIRATORY (INHALATION) at 11:06

## 2019-01-03 ENCOUNTER — OFFICE VISIT (OUTPATIENT)
Dept: FAMILY MEDICINE CLINIC | Facility: CLINIC | Age: 84
End: 2019-01-03

## 2019-01-03 VITALS
SYSTOLIC BLOOD PRESSURE: 90 MMHG | WEIGHT: 115 LBS | TEMPERATURE: 97 F | DIASTOLIC BLOOD PRESSURE: 60 MMHG | RESPIRATION RATE: 16 BRPM | OXYGEN SATURATION: 95 % | HEIGHT: 60 IN | HEART RATE: 95 BPM | BODY MASS INDEX: 22.58 KG/M2

## 2019-01-03 DIAGNOSIS — Z79.01 LONG TERM CURRENT USE OF ANTICOAGULANT: ICD-10-CM

## 2019-01-03 DIAGNOSIS — I48.91 ATRIAL FIBRILLATION, UNSPECIFIED TYPE (HCC): ICD-10-CM

## 2019-01-03 DIAGNOSIS — Z23 NEED FOR VACCINATION: ICD-10-CM

## 2019-01-03 DIAGNOSIS — I10 ESSENTIAL HYPERTENSION: Primary | ICD-10-CM

## 2019-01-03 LAB — INR PPP: 2 (ref 2–3)

## 2019-01-03 PROCEDURE — 90732 PPSV23 VACC 2 YRS+ SUBQ/IM: CPT | Performed by: INTERNAL MEDICINE

## 2019-01-03 PROCEDURE — G0009 ADMIN PNEUMOCOCCAL VACCINE: HCPCS | Performed by: INTERNAL MEDICINE

## 2019-01-03 PROCEDURE — 85610 PROTHROMBIN TIME: CPT | Performed by: INTERNAL MEDICINE

## 2019-01-03 PROCEDURE — 36416 COLLJ CAPILLARY BLOOD SPEC: CPT | Performed by: INTERNAL MEDICINE

## 2019-01-03 PROCEDURE — 99213 OFFICE O/P EST LOW 20 MIN: CPT | Performed by: INTERNAL MEDICINE

## 2019-01-03 NOTE — PROGRESS NOTES
Subjective   Jazmine Brown is a 89 y.o. female. Patient is here today for follow-up on her atrial fibrillation, hypertension and Coumadin therapy.  At the last visit I increased her Coumadin and she's alternating 3 mg 1 day, for the next.  She's had no unusual bruising or bleeding and no respiratory problems.  Chief Complaint   Patient presents with   • Anticoagulation          Vitals:    01/03/19 1535   BP: 90/60   Pulse: 95   Resp: 16   Temp: 97 °F (36.1 °C)   SpO2: 95%     The following portions of the patient's history were reviewed and updated as appropriate: allergies, current medications, past family history, past medical history, past social history, past surgical history and problem list.    Past Medical History:   Diagnosis Date   • Polyp, sigmoid colon    • Thyroid cancer (CMS/HCC)       Allergies   Allergen Reactions   • Codeine Hallucinations   • Latex Rash      Social History     Socioeconomic History   • Marital status:      Spouse name: Not on file   • Number of children: 2   • Years of education: Not on file   • Highest education level: Not on file   Social Needs   • Financial resource strain: Not on file   • Food insecurity - worry: Not on file   • Food insecurity - inability: Not on file   • Transportation needs - medical: Not on file   • Transportation needs - non-medical: Not on file   Occupational History   • Occupation: Lunch Room Lady at [email or web address]     Employer: RETIRED   Tobacco Use   • Smoking status: Never Smoker   • Smokeless tobacco: Never Used   Substance and Sexual Activity   • Alcohol use: No   • Drug use: No   • Sexual activity: Defer   Other Topics Concern   • Not on file   Social History Narrative   • Not on file        Current Outpatient Medications:   •  albuterol (ACCUNEB) 1.25 MG/3ML nebulizer solution, Take 3 mL by nebulization Every 4 (Four) Hours As Needed for Wheezing., Disp: 90 mL, Rfl: 12  •  albuterol (PROAIR HFA) 108 (90 Base) MCG/ACT inhaler, Inhale 2  puffs Every 4 (Four) Hours As Needed for Wheezing or Shortness of Air., Disp: 18 g, Rfl: 5  •  bumetanide (BUMEX) 0.5 MG tablet, Take 2 tablets by mouth Daily., Disp: 180 tablet, Rfl: 1  •  carvedilol (COREG) 6.25 MG tablet, Take 1 tablet by mouth 2 (Two) Times a Day With Meals., Disp: 180 tablet, Rfl: 1  •  clindamycin (CLEOCIN) 150 MG capsule, , Disp: , Rfl:   •  HYDROcodone-acetaminophen (NORCO) 5-325 MG per tablet, , Disp: , Rfl:   •  levothyroxine (SYNTHROID, LEVOTHROID) 75 MCG tablet, Take 1 tablet by mouth Daily., Disp: 90 tablet, Rfl: 1  •  lisinopril (PRINIVIL,ZESTRIL) 5 MG tablet, TAKE 1 TABLET BY MOUTH ONCE DAILY, Disp: 90 tablet, Rfl: 1  •  neomycin-polymyxin-hydrocortisone (CORTISPORIN) 3.5-44428-0 otic solution, ADMINSTER 2 DROPS INTO BOTH EARS 3 TIMES DAILY, Disp: 10 mL, Rfl: 0  •  ondansetron ODT (ZOFRAN-ODT) 4 MG disintegrating tablet, Take 1 tablet by mouth 4 (Four) Times a Day As Needed for Nausea or Vomiting., Disp: 15 tablet, Rfl: 0  •  PARoxetine (PAXIL) 20 MG tablet, Take 1 tablet by mouth Daily., Disp: 90 tablet, Rfl: 1  •  traMADol (ULTRAM) 50 MG tablet, Take 1 tablet by mouth Every 12 (Twelve) Hours As Needed for Severe Pain  (try 1/2 1st and use very sparingly)., Disp: 12 tablet, Rfl: 0  •  triamcinolone (KENALOG) 0.1 % ointment, Apply  topically 2 (Two) Times a Day As Needed for Irritation., Disp: 30 g, Rfl: 0  •  umeclidinium-vilanterol (ANORO ELLIPTA) 62.5-25 MCG/INH aerosol powder  inhaler, Inhale 1 puff Daily., Disp: 1 each, Rfl: 6  •  vitamin D (ERGOCALCIFEROL) 32235 units capsule capsule, Take 1 capsule by mouth Every 7 (Seven) Days., Disp: 12 capsule, Rfl: 3  •  warfarin (COUMADIN) 1 MG tablet, Take 1 tab every other day, Disp: 30 tablet, Rfl: 5  •  warfarin (COUMADIN) 3 MG tablet, Take 1 tablet by mouth Daily., Disp: 30 tablet, Rfl: 5     Objective     History of Present Illness     Review of Systems   Constitutional: Negative.    HENT: Negative.    Eyes: Negative.    Respiratory:  Negative.    Cardiovascular: Negative.    Gastrointestinal: Negative.    Genitourinary: Negative.    Musculoskeletal: Negative.    Skin: Negative.    Neurological: Negative.    Psychiatric/Behavioral: Negative.        Physical Exam   Constitutional: She is oriented to person, place, and time. She appears well-developed and well-nourished.   Pleasant, cooperative no distress   HENT:   Head: Normocephalic and atraumatic.   Eyes: Conjunctivae are normal. Pupils are equal, round, and reactive to light. No scleral icterus.   Neck: Normal range of motion. Neck supple.   Cardiovascular: Normal rate and normal heart sounds.   Pulmonary/Chest: Effort normal and breath sounds normal. No respiratory distress. She has no wheezes. She has no rales.   Musculoskeletal: Normal range of motion. She exhibits no edema.   Neurological: She is alert and oriented to person, place, and time.   Skin: Skin is warm and dry.   Psychiatric: She has a normal mood and affect. Her behavior is normal.   Nursing note and vitals reviewed.      ASSESSMENT  INR was therapeutic at 2.0.  Patient's rate is controlled on her blood pressure was good and her lungs sound clear.  Overall she seems stable.     Problem List Items Addressed This Visit        Cardiovascular and Mediastinum    Atrial fibrillation (CMS/HCC)          PLAN  the patient received a Pneumovax 23 immunization today.  She will continue alternating Coumadin 3 mg 1 day, 4 mg the next and I will recheck her in one month with a pro time and INR    There are no Patient Instructions on file for this visit.  Return in about 1 month (around 2/3/2019) for with labs.

## 2019-02-06 ENCOUNTER — OFFICE VISIT (OUTPATIENT)
Dept: FAMILY MEDICINE CLINIC | Facility: CLINIC | Age: 84
End: 2019-02-06

## 2019-02-06 VITALS
BODY MASS INDEX: 22.97 KG/M2 | WEIGHT: 117 LBS | OXYGEN SATURATION: 94 % | RESPIRATION RATE: 16 BRPM | HEART RATE: 76 BPM | SYSTOLIC BLOOD PRESSURE: 110 MMHG | DIASTOLIC BLOOD PRESSURE: 70 MMHG | TEMPERATURE: 98 F | HEIGHT: 60 IN

## 2019-02-06 DIAGNOSIS — I48.91 ATRIAL FIBRILLATION, UNSPECIFIED TYPE (HCC): ICD-10-CM

## 2019-02-06 DIAGNOSIS — I27.20 MODERATE TO SEVERE PULMONARY HYPERTENSION (HCC): Primary | ICD-10-CM

## 2019-02-06 DIAGNOSIS — Z79.01 LONG TERM CURRENT USE OF ANTICOAGULANT: ICD-10-CM

## 2019-02-06 DIAGNOSIS — I10 ESSENTIAL HYPERTENSION: ICD-10-CM

## 2019-02-06 LAB — INR PPP: 2.3 (ref 2–3)

## 2019-02-06 PROCEDURE — 85610 PROTHROMBIN TIME: CPT | Performed by: INTERNAL MEDICINE

## 2019-02-06 PROCEDURE — 36416 COLLJ CAPILLARY BLOOD SPEC: CPT | Performed by: INTERNAL MEDICINE

## 2019-02-06 PROCEDURE — 99213 OFFICE O/P EST LOW 20 MIN: CPT | Performed by: INTERNAL MEDICINE

## 2019-02-06 RX ORDER — ALBUTEROL SULFATE 1.25 MG/3ML
1 SOLUTION RESPIRATORY (INHALATION) EVERY 4 HOURS PRN
Qty: 90 ML | Refills: 12 | Status: SHIPPED | OUTPATIENT
Start: 2019-02-06 | End: 2019-01-01

## 2019-02-06 NOTE — PROGRESS NOTES
Subjective   Jazmine Brown is a 89 y.o. female. Patient is here today for follow-up on her Coumadin therapy, chronic atrial fibrillation and respiratory problems.  She's been doing quite well and has no real complaints.  She's had no unusual bruising or bleeding.   Chief Complaint   Patient presents with   • Anticoagulation          Vitals:    02/06/19 1314   BP: 110/70   Pulse: 76   Resp: 16   Temp: 98 °F (36.7 °C)   SpO2: 94%     The following portions of the patient's history were reviewed and updated as appropriate: allergies, current medications, past family history, past medical history, past social history, past surgical history and problem list.    Past Medical History:   Diagnosis Date   • Polyp, sigmoid colon    • Thyroid cancer (CMS/HCC)       Allergies   Allergen Reactions   • Codeine Hallucinations   • Latex Rash      Social History     Socioeconomic History   • Marital status:      Spouse name: Not on file   • Number of children: 2   • Years of education: Not on file   • Highest education level: Not on file   Social Needs   • Financial resource strain: Not on file   • Food insecurity - worry: Not on file   • Food insecurity - inability: Not on file   • Transportation needs - medical: Not on file   • Transportation needs - non-medical: Not on file   Occupational History   • Occupation: Lunch Room Lady at [employer]     Employer: RETIRED   Tobacco Use   • Smoking status: Never Smoker   • Smokeless tobacco: Never Used   Substance and Sexual Activity   • Alcohol use: No   • Drug use: No   • Sexual activity: Defer   Other Topics Concern   • Not on file   Social History Narrative   • Not on file        Current Outpatient Medications:   •  albuterol (ACCUNEB) 1.25 MG/3ML nebulizer solution, Take 3 mL by nebulization Every 4 (Four) Hours As Needed for Wheezing., Disp: 90 mL, Rfl: 12  •  albuterol (PROAIR HFA) 108 (90 Base) MCG/ACT inhaler, Inhale 2 puffs Every 4 (Four) Hours As Needed for  Wheezing or Shortness of Air., Disp: 18 g, Rfl: 5  •  bumetanide (BUMEX) 0.5 MG tablet, Take 2 tablets by mouth Daily., Disp: 180 tablet, Rfl: 1  •  carvedilol (COREG) 6.25 MG tablet, Take 1 tablet by mouth 2 (Two) Times a Day With Meals., Disp: 180 tablet, Rfl: 1  •  clindamycin (CLEOCIN) 150 MG capsule, , Disp: , Rfl:   •  HYDROcodone-acetaminophen (NORCO) 5-325 MG per tablet, , Disp: , Rfl:   •  levothyroxine (SYNTHROID, LEVOTHROID) 75 MCG tablet, Take 1 tablet by mouth Daily., Disp: 90 tablet, Rfl: 1  •  lisinopril (PRINIVIL,ZESTRIL) 5 MG tablet, TAKE 1 TABLET BY MOUTH ONCE DAILY, Disp: 90 tablet, Rfl: 1  •  neomycin-polymyxin-hydrocortisone (CORTISPORIN) 3.5-13605-2 otic solution, ADMINSTER 2 DROPS INTO BOTH EARS 3 TIMES DAILY, Disp: 10 mL, Rfl: 0  •  ondansetron ODT (ZOFRAN-ODT) 4 MG disintegrating tablet, Take 1 tablet by mouth 4 (Four) Times a Day As Needed for Nausea or Vomiting., Disp: 15 tablet, Rfl: 0  •  PARoxetine (PAXIL) 20 MG tablet, Take 1 tablet by mouth Daily., Disp: 90 tablet, Rfl: 1  •  traMADol (ULTRAM) 50 MG tablet, Take 1 tablet by mouth Every 12 (Twelve) Hours As Needed for Severe Pain  (try 1/2 1st and use very sparingly)., Disp: 12 tablet, Rfl: 0  •  triamcinolone (KENALOG) 0.1 % ointment, Apply  topically 2 (Two) Times a Day As Needed for Irritation., Disp: 30 g, Rfl: 0  •  umeclidinium-vilanterol (ANORO ELLIPTA) 62.5-25 MCG/INH aerosol powder  inhaler, Inhale 1 puff Daily., Disp: 1 each, Rfl: 10  •  vitamin D (ERGOCALCIFEROL) 24182 units capsule capsule, Take 1 capsule by mouth Every 7 (Seven) Days., Disp: 12 capsule, Rfl: 3  •  warfarin (COUMADIN) 1 MG tablet, Take 1 tab every other day, Disp: 30 tablet, Rfl: 5  •  warfarin (COUMADIN) 3 MG tablet, Take 1 tablet by mouth Daily., Disp: 30 tablet, Rfl: 5     Objective     History of Present Illness     Review of Systems   Constitutional: Negative.    HENT: Negative.    Eyes: Negative.    Respiratory: Negative.    Cardiovascular: Negative.     Gastrointestinal: Negative.    Genitourinary: Negative.    Musculoskeletal: Negative.    Skin: Negative.    Neurological: Negative.    Psychiatric/Behavioral: Negative.        Physical Exam   Constitutional: She appears well-developed and well-nourished.   Pleasant, cooperative elderly alert female in no distress   HENT:   Head: Normocephalic and atraumatic.   Eyes: Conjunctivae are normal. Pupils are equal, round, and reactive to light.   Neck: Normal range of motion. Neck supple.   Cardiovascular: Normal rate and normal heart sounds.   Pulmonary/Chest: Effort normal. No respiratory distress. She has no wheezes.   Musculoskeletal: Normal range of motion. She exhibits no edema.   Neurological: She is alert.   Skin: Skin is warm and dry.   Psychiatric: She has a normal mood and affect. Her behavior is normal.   Nursing note and vitals reviewed.      ASSESSMENT  patient's INR is therapeutic at 2.3.  #1-chronic atrial fibrillation  #2-hypertension, reasonable control  #3-COPD, stable and pretty asymptomatic currently     Problem List Items Addressed This Visit        Cardiovascular and Mediastinum    Atrial fibrillation (CMS/HCC)    Essential hypertension    Moderate to severe pulmonary hypertension (CMS/HCC) - Primary       Other    Long term current use of anticoagulant          PLAN  the patient will continuing altering Coumadin 3 and 4 mg every other day and I'll recheck her in one month with a pro time and INR    There are no Patient Instructions on file for this visit.  Return in about 1 month (around 3/6/2019).

## 2019-02-27 ENCOUNTER — TELEPHONE (OUTPATIENT)
Dept: FAMILY MEDICINE CLINIC | Facility: CLINIC | Age: 84
End: 2019-02-27

## 2019-02-27 RX ORDER — ERGOCALCIFEROL 1.25 MG/1
50000 CAPSULE ORAL WEEKLY
Qty: 12 CAPSULE | Refills: 0 | Status: SHIPPED | OUTPATIENT
Start: 2019-02-27 | End: 2020-01-01

## 2019-02-27 NOTE — TELEPHONE ENCOUNTER
----spoke to son rx was sent to local pharmacy and we will recheck her vitamin d I=when she comes in in march    - Message from Gerard Jama MD sent at 2/27/2019 11:15 AM EST -----  Contact: BERNARD ( SON )   I would like her to continue taking the vitamin D 50,000 units once a week.  She is scheduled for an appointment in early March and in addition to the PT and INR, see if we can add in a vitamin D level to be drawn then.      ----- Message -----  From: Raisa Tan MA  Sent: 2/27/2019  10:17 AM  To: Gerard Jama MD    Please discuss this at next visit per son  ----- Message -----  From: Qing Mishra  Sent: 2/27/2019  10:11 AM  To: Raisa Tan MA    PLEASE CALL SON BERNARD     NEEDS TO DISCUSS ONE OF HER MEDICATIONS     WAS TAKING VIT D 2 BY ANOTHER DR .     SHE STOPPED TAKING IT AND SAYS HE THINKS SHE NEEDS TO ASTART THAT MED     SHE HAS APPT 3/6/19 AND OS WANTING DR BAXTER TO DISCUSS THIS WITH HER TO START THIS VIT D AGAIN     PLEASE CALL SON WITH ANY QUESTIONS     [phone]

## 2019-03-05 RX ORDER — CARVEDILOL 6.25 MG/1
TABLET ORAL
Qty: 180 TABLET | Refills: 1 | Status: SHIPPED | OUTPATIENT
Start: 2019-03-05 | End: 2019-01-01

## 2019-03-06 ENCOUNTER — OFFICE VISIT (OUTPATIENT)
Dept: FAMILY MEDICINE CLINIC | Facility: CLINIC | Age: 84
End: 2019-03-06

## 2019-03-06 VITALS
BODY MASS INDEX: 23.16 KG/M2 | DIASTOLIC BLOOD PRESSURE: 60 MMHG | OXYGEN SATURATION: 97 % | SYSTOLIC BLOOD PRESSURE: 110 MMHG | RESPIRATION RATE: 18 BRPM | HEIGHT: 60 IN | HEART RATE: 107 BPM | WEIGHT: 118 LBS

## 2019-03-06 DIAGNOSIS — I48.91 ATRIAL FIBRILLATION, UNSPECIFIED TYPE (HCC): ICD-10-CM

## 2019-03-06 DIAGNOSIS — I10 ESSENTIAL HYPERTENSION: ICD-10-CM

## 2019-03-06 DIAGNOSIS — T14.8XXA HEMATOMA: ICD-10-CM

## 2019-03-06 DIAGNOSIS — E03.9 ACQUIRED HYPOTHYROIDISM: ICD-10-CM

## 2019-03-06 DIAGNOSIS — Z79.01 LONG TERM CURRENT USE OF ANTICOAGULANT: Primary | ICD-10-CM

## 2019-03-06 LAB — INR PPP: 1.7 (ref 2–3)

## 2019-03-06 PROCEDURE — 99214 OFFICE O/P EST MOD 30 MIN: CPT | Performed by: INTERNAL MEDICINE

## 2019-03-06 PROCEDURE — 36416 COLLJ CAPILLARY BLOOD SPEC: CPT | Performed by: INTERNAL MEDICINE

## 2019-03-06 PROCEDURE — 85610 PROTHROMBIN TIME: CPT | Performed by: INTERNAL MEDICINE

## 2019-03-06 NOTE — PROGRESS NOTES
Subjective   Jazmine Brown is a 89 y.o. female. Patient is here today for follow-up on her hypertension, atrial fibrillation, hypothyroidism and Coumadin therapy.  Patient is generally been stable but apparently had a fall at home and has a bruise in her left hip.  She is alert and coherent and cheerful enough.  Chief Complaint   Patient presents with   • Anticoagulation   • Fall     today hurt left side bruised   • Fatigue   • Loss of Consciousness     today fell in bathroom          Vitals:    03/06/19 1321   BP: 110/60   Pulse: 107   Resp: 18   SpO2: 97%     The following portions of the patient's history were reviewed and updated as appropriate: allergies, current medications, past family history, past medical history, past social history, past surgical history and problem list.    Past Medical History:   Diagnosis Date   • Polyp, sigmoid colon    • Thyroid cancer (CMS/HCC)       Allergies   Allergen Reactions   • Codeine Hallucinations   • Latex Rash      Social History     Socioeconomic History   • Marital status:      Spouse name: Not on file   • Number of children: 2   • Years of education: Not on file   • Highest education level: Not on file   Social Needs   • Financial resource strain: Not on file   • Food insecurity - worry: Not on file   • Food insecurity - inability: Not on file   • Transportation needs - medical: Not on file   • Transportation needs - non-medical: Not on file   Occupational History   • Occupation: Lunch Room Lady at [employer]     Employer: RETIRED   Tobacco Use   • Smoking status: Never Smoker   • Smokeless tobacco: Never Used   Substance and Sexual Activity   • Alcohol use: No   • Drug use: No   • Sexual activity: Defer   Other Topics Concern   • Not on file   Social History Narrative   • Not on file        Current Outpatient Medications:   •  albuterol (ACCUNEB) 1.25 MG/3ML nebulizer solution, Take 3 mL by nebulization Every 4 (Four) Hours As Needed for Wheezing.,  Disp: 90 mL, Rfl: 12  •  albuterol (PROAIR HFA) 108 (90 Base) MCG/ACT inhaler, Inhale 2 puffs Every 4 (Four) Hours As Needed for Wheezing or Shortness of Air., Disp: 18 g, Rfl: 5  •  bumetanide (BUMEX) 0.5 MG tablet, Take 2 tablets by mouth Daily., Disp: 180 tablet, Rfl: 1  •  carvedilol (COREG) 6.25 MG tablet, TAKE 1 TABLET BY MOUTH TWICE DAILY WITH MEALS, Disp: 180 tablet, Rfl: 1  •  clindamycin (CLEOCIN) 150 MG capsule, , Disp: , Rfl:   •  levothyroxine (SYNTHROID, LEVOTHROID) 75 MCG tablet, Take 1 tablet by mouth Daily., Disp: 90 tablet, Rfl: 1  •  lisinopril (PRINIVIL,ZESTRIL) 5 MG tablet, TAKE 1 TABLET BY MOUTH ONCE DAILY, Disp: 90 tablet, Rfl: 1  •  neomycin-polymyxin-hydrocortisone (CORTISPORIN) 3.5-98264-3 otic solution, ADMINSTER 2 DROPS INTO BOTH EARS 3 TIMES DAILY, Disp: 10 mL, Rfl: 0  •  ondansetron ODT (ZOFRAN-ODT) 4 MG disintegrating tablet, Take 1 tablet by mouth 4 (Four) Times a Day As Needed for Nausea or Vomiting., Disp: 15 tablet, Rfl: 0  •  PARoxetine (PAXIL) 20 MG tablet, Take 1 tablet by mouth Daily., Disp: 90 tablet, Rfl: 1  •  triamcinolone (KENALOG) 0.1 % ointment, Apply  topically 2 (Two) Times a Day As Needed for Irritation., Disp: 30 g, Rfl: 0  •  umeclidinium-vilanterol (ANORO ELLIPTA) 62.5-25 MCG/INH aerosol powder  inhaler, Inhale 1 puff Daily., Disp: 1 each, Rfl: 10  •  vitamin D (ERGOCALCIFEROL) 05327 units capsule capsule, Take 1 capsule by mouth 1 (One) Time Per Week., Disp: 12 capsule, Rfl: 0  •  warfarin (COUMADIN) 1 MG tablet, Take 1 tab every other day, Disp: 30 tablet, Rfl: 5  •  warfarin (COUMADIN) 3 MG tablet, Take 1 tablet by mouth Daily., Disp: 30 tablet, Rfl: 5     Objective     History of Present Illness     Review of Systems   Constitutional: Negative.    HENT: Negative.    Eyes: Negative.    Respiratory: Negative.    Cardiovascular: Negative.    Gastrointestinal: Negative.    Genitourinary: Negative.    Musculoskeletal:        Swelling and discomfort in the left hip  from a fall earlier at home   Skin: Negative.    Neurological: Negative.    Psychiatric/Behavioral: Negative.        Physical Exam   Constitutional: She appears well-developed and well-nourished.   Pleasant, cooperative in no acute distress, blood pressure 130/80   HENT:   Head: Normocephalic and atraumatic.   Eyes: Conjunctivae are normal. Pupils are equal, round, and reactive to light. No scleral icterus.   Neck: Normal range of motion.   Cardiovascular: Normal rate, regular rhythm and normal heart sounds.   Pulmonary/Chest: Effort normal and breath sounds normal. No respiratory distress. She has no wheezes. She has no rales.   Musculoskeletal: Normal range of motion. She exhibits tenderness.   Patient has some tenderness in the left hip area and a hematoma with some bruising in that area.  There is no significant pain in moving the left leg around and I do not think she fractured her hip.   Neurological: She is alert.   Skin: Skin is warm and dry.   Psychiatric: She has a normal mood and affect. Her behavior is normal.   Nursing note and vitals reviewed.      ASSESSMENT INR was a bit low at 1.7.  The patient had a fall at home with a large hematoma overlying the left hip area but no apparent hip injury.  Because of this I do not want to increase her Coumadin now.  #2-hypertension, well controlled     Problem List Items Addressed This Visit        Cardiovascular and Mediastinum    Atrial fibrillation (CMS/HCC)    Relevant Orders    CBC & Differential    Essential hypertension    Relevant Orders    Comprehensive Metabolic Panel       Endocrine    Hypothyroidism    Relevant Orders    TSH    T4, Free       Other    Long term current use of anticoagulant - Primary    Relevant Orders    CBC & Differential    Hematoma     Left hip 3-2019         Relevant Orders    CBC & Differential          PLAN the patient will use some ice off and on for the next day and can use some Tylenol for pain as needed.  She will continue  current medicines as now.  I am going to draw a CBC, CMP, TSH and free T4 today and want to recheck her 1 week with a pro time and INR then    There are no Patient Instructions on file for this visit.  Return in about 1 week (around 3/13/2019).

## 2019-03-07 LAB
ALBUMIN SERPL-MCNC: 4.1 G/DL (ref 3.5–5.2)
ALBUMIN/GLOB SERPL: 1.6 G/DL
ALP SERPL-CCNC: 79 U/L (ref 39–117)
ALT SERPL-CCNC: 7 U/L (ref 1–33)
AST SERPL-CCNC: 19 U/L (ref 1–32)
BASOPHILS # BLD AUTO: 0.09 10*3/MM3 (ref 0–0.2)
BASOPHILS NFR BLD AUTO: 0.8 % (ref 0–1.5)
BILIRUB SERPL-MCNC: 0.3 MG/DL (ref 0.1–1.2)
BUN SERPL-MCNC: 21 MG/DL (ref 8–23)
BUN/CREAT SERPL: 19.8 (ref 7–25)
CALCIUM SERPL-MCNC: 9.4 MG/DL (ref 8.2–9.6)
CHLORIDE SERPL-SCNC: 103 MMOL/L (ref 98–107)
CO2 SERPL-SCNC: 28.2 MMOL/L (ref 22–29)
CREAT SERPL-MCNC: 1.06 MG/DL (ref 0.57–1)
DIFFERENTIAL COMMENT: NORMAL
EOSINOPHIL # BLD AUTO: 0.05 10*3/MM3 (ref 0–0.4)
EOSINOPHIL NFR BLD AUTO: 0.5 % (ref 0.3–6.2)
ERYTHROCYTE [DISTWIDTH] IN BLOOD BY AUTOMATED COUNT: 15.4 % (ref 12.3–15.4)
GLOBULIN SER CALC-MCNC: 2.6 GM/DL
GLUCOSE SERPL-MCNC: 108 MG/DL (ref 65–99)
HCT VFR BLD AUTO: 37.8 % (ref 34–46.6)
HGB BLD-MCNC: 11.4 G/DL (ref 12–15.9)
IMM GRANULOCYTES # BLD AUTO: 0.06 10*3/MM3 (ref 0–0.05)
IMM GRANULOCYTES NFR BLD AUTO: 0.6 % (ref 0–0.5)
LYMPHOCYTES # BLD AUTO: 1.1 10*3/MM3 (ref 0.7–3.1)
LYMPHOCYTES NFR BLD AUTO: 10.3 % (ref 19.6–45.3)
MCH RBC QN AUTO: 35.5 PG (ref 26.6–33)
MCHC RBC AUTO-ENTMCNC: 30.2 G/DL (ref 31.5–35.7)
MCV RBC AUTO: 117.8 FL (ref 79–97)
MONOCYTES # BLD AUTO: 0.65 10*3/MM3 (ref 0.1–0.9)
MONOCYTES NFR BLD AUTO: 6.1 % (ref 5–12)
NEUTROPHILS # BLD AUTO: 8.78 10*3/MM3 (ref 1.4–7)
NEUTROPHILS NFR BLD AUTO: 81.7 % (ref 42.7–76)
PLATELET # BLD AUTO: 425 10*3/MM3 (ref 140–450)
PLATELET BLD QL SMEAR: NORMAL
POTASSIUM SERPL-SCNC: 5.9 MMOL/L (ref 3.5–5.2)
PROT SERPL-MCNC: 6.7 G/DL (ref 6–8.5)
RBC # BLD AUTO: 3.21 10*6/MM3 (ref 3.77–5.28)
RBC MORPH BLD: NORMAL
SODIUM SERPL-SCNC: 141 MMOL/L (ref 136–145)
T4 FREE SERPL-MCNC: 1.07 NG/DL (ref 0.93–1.7)
TSH SERPL DL<=0.005 MIU/L-ACNC: 5.56 MIU/ML (ref 0.27–4.2)
WBC # BLD AUTO: 10.73 10*3/MM3 (ref 3.4–10.8)

## 2019-03-13 ENCOUNTER — OFFICE VISIT (OUTPATIENT)
Dept: FAMILY MEDICINE CLINIC | Facility: CLINIC | Age: 84
End: 2019-03-13

## 2019-03-13 VITALS
SYSTOLIC BLOOD PRESSURE: 120 MMHG | RESPIRATION RATE: 15 BRPM | HEART RATE: 95 BPM | BODY MASS INDEX: 23.16 KG/M2 | OXYGEN SATURATION: 98 % | DIASTOLIC BLOOD PRESSURE: 70 MMHG | HEIGHT: 60 IN | TEMPERATURE: 98 F | WEIGHT: 118 LBS

## 2019-03-13 DIAGNOSIS — I48.91 ATRIAL FIBRILLATION, UNSPECIFIED TYPE (HCC): ICD-10-CM

## 2019-03-13 DIAGNOSIS — T14.8XXA HEMATOMA: Primary | ICD-10-CM

## 2019-03-13 DIAGNOSIS — I10 ESSENTIAL HYPERTENSION: ICD-10-CM

## 2019-03-13 DIAGNOSIS — E03.9 ACQUIRED HYPOTHYROIDISM: ICD-10-CM

## 2019-03-13 DIAGNOSIS — Z79.01 LONG TERM CURRENT USE OF ANTICOAGULANT: ICD-10-CM

## 2019-03-13 DIAGNOSIS — R53.82 CHRONIC FATIGUE: ICD-10-CM

## 2019-03-13 LAB — INR PPP: 2.5 (ref 2–3)

## 2019-03-13 PROCEDURE — 99214 OFFICE O/P EST MOD 30 MIN: CPT | Performed by: INTERNAL MEDICINE

## 2019-03-13 PROCEDURE — 36416 COLLJ CAPILLARY BLOOD SPEC: CPT | Performed by: INTERNAL MEDICINE

## 2019-03-13 PROCEDURE — 85610 PROTHROMBIN TIME: CPT | Performed by: INTERNAL MEDICINE

## 2019-03-13 NOTE — PROGRESS NOTES
Subjective   Jazmine Brown is a 89 y.o. female. Patient is here today for follow-up on her hypertension, atrial fibrillation, Coumadin therapy and hypothyroidism.  She also complains of chronic fatigue.  At the last visit she had had a fall with a large hematoma in the left hip and buttock area.  That generally is feeling well but she has large bruising across both buttocks and a soft in the left hip.  That is nontender and noninfected.  Probable hematoma or loculated blood the bruises appear to be in early stages of breakdown.  Chief Complaint   Patient presents with   • Anticoagulation   • Shortness of Breath   • Hip Pain     left hip          Vitals:    03/13/19 1507   BP: 120/70   Pulse: 95   Resp: 15   Temp: 98 °F (36.7 °C)   SpO2: 98%     The following portions of the patient's history were reviewed and updated as appropriate: allergies, current medications, past family history, past medical history, past social history, past surgical history and problem list.    Past Medical History:   Diagnosis Date   • Polyp, sigmoid colon    • Thyroid cancer (CMS/HCC)       Allergies   Allergen Reactions   • Codeine Hallucinations   • Latex Rash      Social History     Socioeconomic History   • Marital status:      Spouse name: Not on file   • Number of children: 2   • Years of education: Not on file   • Highest education level: Not on file   Social Needs   • Financial resource strain: Not on file   • Food insecurity - worry: Not on file   • Food insecurity - inability: Not on file   • Transportation needs - medical: Not on file   • Transportation needs - non-medical: Not on file   Occupational History   • Occupation: Lunch Room Lady at [employer]     Employer: RETIRED   Tobacco Use   • Smoking status: Never Smoker   • Smokeless tobacco: Never Used   Substance and Sexual Activity   • Alcohol use: No   • Drug use: No   • Sexual activity: Defer   Other Topics Concern   • Not on file   Social History  Narrative   • Not on file        Current Outpatient Medications:   •  albuterol (ACCUNEB) 1.25 MG/3ML nebulizer solution, Take 3 mL by nebulization Every 4 (Four) Hours As Needed for Wheezing., Disp: 90 mL, Rfl: 12  •  albuterol (PROAIR HFA) 108 (90 Base) MCG/ACT inhaler, Inhale 2 puffs Every 4 (Four) Hours As Needed for Wheezing or Shortness of Air., Disp: 18 g, Rfl: 5  •  bumetanide (BUMEX) 0.5 MG tablet, Take 2 tablets by mouth Daily., Disp: 180 tablet, Rfl: 1  •  carvedilol (COREG) 6.25 MG tablet, TAKE 1 TABLET BY MOUTH TWICE DAILY WITH MEALS, Disp: 180 tablet, Rfl: 1  •  clindamycin (CLEOCIN) 150 MG capsule, , Disp: , Rfl:   •  levothyroxine (SYNTHROID, LEVOTHROID) 75 MCG tablet, Take 1 tablet by mouth Daily., Disp: 90 tablet, Rfl: 1  •  lisinopril (PRINIVIL,ZESTRIL) 5 MG tablet, TAKE 1 TABLET BY MOUTH ONCE DAILY, Disp: 90 tablet, Rfl: 1  •  neomycin-polymyxin-hydrocortisone (CORTISPORIN) 3.5-18074-2 otic solution, ADMINSTER 2 DROPS INTO BOTH EARS 3 TIMES DAILY, Disp: 10 mL, Rfl: 0  •  ondansetron ODT (ZOFRAN-ODT) 4 MG disintegrating tablet, Take 1 tablet by mouth 4 (Four) Times a Day As Needed for Nausea or Vomiting., Disp: 15 tablet, Rfl: 0  •  PARoxetine (PAXIL) 20 MG tablet, Take 1 tablet by mouth Daily., Disp: 90 tablet, Rfl: 1  •  triamcinolone (KENALOG) 0.1 % ointment, Apply  topically 2 (Two) Times a Day As Needed for Irritation., Disp: 30 g, Rfl: 0  •  umeclidinium-vilanterol (ANORO ELLIPTA) 62.5-25 MCG/INH aerosol powder  inhaler, Inhale 1 puff Daily., Disp: 1 each, Rfl: 10  •  vitamin D (ERGOCALCIFEROL) 83888 units capsule capsule, Take 1 capsule by mouth 1 (One) Time Per Week., Disp: 12 capsule, Rfl: 0  •  warfarin (COUMADIN) 1 MG tablet, Take 1 tab every other day, Disp: 30 tablet, Rfl: 5  •  warfarin (COUMADIN) 3 MG tablet, Take 1 tablet by mouth Daily., Disp: 30 tablet, Rfl: 5     Objective     History of Present Illness     Review of Systems   Constitutional: Negative.    HENT: Negative.     Eyes: Negative.    Respiratory: Negative.    Cardiovascular: Negative.    Gastrointestinal: Negative.    Endocrine: Negative.    Genitourinary: Negative.    Musculoskeletal: Positive for arthralgias.   Skin:        Large amount of bruising in the left hip and buttocks area that is resolving   Neurological: Negative.    Psychiatric/Behavioral: Negative.        Physical Exam   Constitutional: She appears well-developed and well-nourished.   Pleasant, cooperative no distress, blood pressure 120/80   HENT:   Head: Normocephalic and atraumatic.   Eyes: Conjunctivae are normal. Pupils are equal, round, and reactive to light. No scleral icterus.   Neck: Normal range of motion. Neck supple.   Cardiovascular: Normal rate, regular rhythm and normal heart sounds.   Pulmonary/Chest: Effort normal and breath sounds normal. No respiratory distress. She has no wheezes. She has no rales.   Musculoskeletal: Normal range of motion. She exhibits no edema.   Neurological: She is alert.   Skin: Skin is warm and dry.   Psychiatric: She has a normal mood and affect. Her behavior is normal.   Nursing note and vitals reviewed.      ASSESSMENT patient's INR is therapeutic at 2.5.  The hematoma and bruising from her fall several weeks ago is stable and starting to resolved without signs of infection.  Most recent CBC has a white cell count of 10,000, some low RBC count of 3.2, hemoglobin 11.4 and normal hematocrit 37.8 with macrocytic indices.  CMP had a sugar of 108 and a creatinine of 1.06 and potassium 5.9.  TSH was slightly high at 5.56 and free T4 was low normal at 1.07  #1-chronic atrial fibrillation  #2-hypertension, reasonable control  #3-hypothyroidism with slightly elevated TSH  #4-resolving hematoma  #5-borderline anemia with macrocytosis  #6-complaints of fatigue  #7-Coumadin therapy     Problem List Items Addressed This Visit        Cardiovascular and Mediastinum    Atrial fibrillation (CMS/HCC)    Essential hypertension        Endocrine    Hypothyroidism       Other    Fatigue    Long term current use of anticoagulant    Hematoma - Primary          PLAN the patient will continue current medicines as now.  Want to recheck her in 1 month with a pro time and INR as well as nonfasting CBC, serum iron and TIBC, vitamin B12 and folic acid levels and TSH and free T4    There are no Patient Instructions on file for this visit.  Return in about 1 month (around 4/13/2019) for with labs.

## 2019-04-18 PROBLEM — F32.9 MAJOR DEPRESSIVE DISORDER: Status: ACTIVE | Noted: 2019-01-01

## 2019-04-18 NOTE — PATIENT INSTRUCTIONS
Will resume citalopram and levothyroxine at current dosages.  INR today is subtherapeutic.  Will increase warfarin to 4.5 mg daily and plan on rechecking an INR in 1 week.

## 2019-04-18 NOTE — PROGRESS NOTES
Subjective   Jazmine Brown is a 89 y.o. female. Patient is here today for   Chief Complaint   Patient presents with   • Fussy     x 2 days    • Depression     PHQ9 Score:7 (5-9 = Mild depression)          Vitals:    04/18/19 1254   BP: 106/60   Pulse: 59   Resp: 18   SpO2: (!) 88%     The following portions of the patient's history were reviewed and updated as appropriate: allergies, current medications, past family history, past medical history, past social history, past surgical history and problem list.    Past Medical History:   Diagnosis Date   • Polyp, sigmoid colon    • Thyroid cancer (CMS/HCC)       Allergies   Allergen Reactions   • Codeine Hallucinations   • Latex Rash      Social History     Socioeconomic History   • Marital status:      Spouse name: Not on file   • Number of children: 2   • Years of education: Not on file   • Highest education level: Not on file   Occupational History   • Occupation: Lunch Room Lady at [employer]     Employer: RETIRED   Tobacco Use   • Smoking status: Never Smoker   • Smokeless tobacco: Never Used   Substance and Sexual Activity   • Alcohol use: No   • Drug use: No   • Sexual activity: Defer        Current Outpatient Medications:   •  albuterol (ACCUNEB) 1.25 MG/3ML nebulizer solution, Take 3 mL by nebulization Every 4 (Four) Hours As Needed for Wheezing., Disp: 90 mL, Rfl: 12  •  albuterol (ACCUNEB) 1.25 MG/3ML nebulizer solution, Take 3 mL by nebulization Every 4 (Four) Hours As Needed for Wheezing., Disp: 90 vial, Rfl: 2  •  albuterol (PROAIR HFA) 108 (90 Base) MCG/ACT inhaler, Inhale 2 puffs Every 4 (Four) Hours As Needed for Wheezing or Shortness of Air., Disp: 18 g, Rfl: 5  •  bumetanide (BUMEX) 0.5 MG tablet, Take 2 tablets by mouth Daily., Disp: 180 tablet, Rfl: 1  •  carvedilol (COREG) 6.25 MG tablet, TAKE 1 TABLET BY MOUTH TWICE DAILY WITH MEALS, Disp: 180 tablet, Rfl: 1  •  clindamycin (CLEOCIN) 150 MG capsule, , Disp: , Rfl:   •   levothyroxine (SYNTHROID, LEVOTHROID) 75 MCG tablet, Take 1 tablet by mouth Daily., Disp: 90 tablet, Rfl: 1  •  lisinopril (PRINIVIL,ZESTRIL) 5 MG tablet, Take 1 tablet by mouth Daily., Disp: 90 tablet, Rfl: 1  •  neomycin-polymyxin-hydrocortisone (CORTISPORIN) 3.5-34069-7 otic solution, ADMINSTER 2 DROPS INTO BOTH EARS 3 TIMES DAILY, Disp: 10 mL, Rfl: 0  •  ondansetron ODT (ZOFRAN-ODT) 4 MG disintegrating tablet, Take 1 tablet by mouth 4 (Four) Times a Day As Needed for Nausea or Vomiting., Disp: 15 tablet, Rfl: 0  •  PARoxetine (PAXIL) 20 MG tablet, Take 1 tablet by mouth Daily., Disp: 90 tablet, Rfl: 1  •  triamcinolone (KENALOG) 0.1 % ointment, Apply  topically 2 (Two) Times a Day As Needed for Irritation., Disp: 30 g, Rfl: 0  •  umeclidinium-vilanterol (ANORO ELLIPTA) 62.5-25 MCG/INH aerosol powder  inhaler, Inhale 1 puff Daily., Disp: 1 each, Rfl: 10  •  vitamin D (ERGOCALCIFEROL) 31236 units capsule capsule, Take 1 capsule by mouth 1 (One) Time Per Week., Disp: 12 capsule, Rfl: 0  •  warfarin (COUMADIN) 1 MG tablet, Take 1 tab every other day, Disp: 30 tablet, Rfl: 5  •  warfarin (COUMADIN) 3 MG tablet, Take 1 tablet by mouth Daily., Disp: 30 tablet, Rfl: 5     Objective     History of Present Illness Maco complains of crying over the last few days.  She does have depression, her caretaker noted that she had been off of citalopram over the last 2 weeks.  She also has hypothyroidism and has been off of levothyroxine as well.  She had recent lab work and her TSH was elevated and free T4 was low.  She also has atrial fibrillation and is on warfarin.  Her INR was subtherapeutic.  She currently alternates 3 mg with 4-1/2 mg every other day.      Review of Systems   Constitutional: Negative for unexpected weight change.   Respiratory: Positive for shortness of breath.    Musculoskeletal: Positive for arthralgias.   Psychiatric/Behavioral: Positive for dysphoric mood and sleep disturbance. Negative for behavioral  problems.       Physical Exam   Constitutional:   Elderly female who is alert and cooperative.  During the visit she did have an episode of tearfulness   Cardiovascular: S1 normal and S2 normal. An irregularly irregular rhythm present.   Pulmonary/Chest: Effort normal.   Basilar crackles   Musculoskeletal: She exhibits no edema.   Neurological: She is alert.   Psychiatric:   tearful   Vitals reviewed.      ASSESSMENT     Problem List Items Addressed This Visit        Cardiovascular and Mediastinum    Atrial fibrillation (CMS/HCC) - Primary    Relevant Orders    POC INR (Completed)       Endocrine    Hypothyroidism    Relevant Orders    POC INR (Completed)       Other    Long term current use of anticoagulant    Relevant Orders    POC INR (Completed)    Major depressive disorder          PLAN  Patient Instructions   Will resume citalopram and levothyroxine at current dosages.  INR today is subtherapeutic.  Will increase warfarin to 4.5 mg daily and plan on rechecking an INR in 1 week.    Return in about 1 week (around 4/25/2019), or with INR.

## 2019-05-15 NOTE — PROGRESS NOTES
Subjective   Jazmine Brown is a 89 y.o. female. Patient is here today for follow-up on her Coumadin therapy.  She is feeling fine.  She did have a fall had a large hematoma on the left hip area and now has a mass there she also wants me to check.  She has had no chest pain, shortness of breath, edema or myalgias  Chief Complaint   Patient presents with   • Anticoagulation   • Asthma     new rx for nebulizer   • Eye Problem     eye medication question   • Hip Pain     left          Vitals:    05/15/19 1453   BP: 150/80   Pulse: 111   Resp: 17   SpO2: 94%     The following portions of the patient's history were reviewed and updated as appropriate: allergies, current medications, past family history, past medical history, past social history, past surgical history and problem list.    Past Medical History:   Diagnosis Date   • Polyp, sigmoid colon    • Thyroid cancer (CMS/HCC)       Allergies   Allergen Reactions   • Codeine Hallucinations   • Latex Rash      Social History     Socioeconomic History   • Marital status:      Spouse name: Not on file   • Number of children: 2   • Years of education: Not on file   • Highest education level: Not on file   Occupational History   • Occupation: Lunch Room Lady at [employer]     Employer: RETIRED   Tobacco Use   • Smoking status: Never Smoker   • Smokeless tobacco: Never Used   Substance and Sexual Activity   • Alcohol use: No   • Drug use: No   • Sexual activity: Defer        Current Outpatient Medications:   •  albuterol (ACCUNEB) 1.25 MG/3ML nebulizer solution, Take 3 mL by nebulization Every 4 (Four) Hours As Needed for Wheezing., Disp: 90 mL, Rfl: 12  •  albuterol (ACCUNEB) 1.25 MG/3ML nebulizer solution, Take 3 mL by nebulization Every 4 (Four) Hours As Needed for Wheezing., Disp: 90 vial, Rfl: 2  •  albuterol (PROAIR HFA) 108 (90 Base) MCG/ACT inhaler, Inhale 2 puffs Every 4 (Four) Hours As Needed for Wheezing or Shortness of Air., Disp: 18 g, Rfl:  5  •  bumetanide (BUMEX) 0.5 MG tablet, Take 2 tablets by mouth Daily., Disp: 180 tablet, Rfl: 1  •  carvedilol (COREG) 6.25 MG tablet, TAKE 1 TABLET BY MOUTH TWICE DAILY WITH MEALS, Disp: 180 tablet, Rfl: 1  •  clindamycin (CLEOCIN) 150 MG capsule, , Disp: , Rfl:   •  levothyroxine (SYNTHROID, LEVOTHROID) 75 MCG tablet, Take 1 tablet by mouth Daily., Disp: 90 tablet, Rfl: 1  •  lisinopril (PRINIVIL,ZESTRIL) 5 MG tablet, Take 1 tablet by mouth Daily., Disp: 90 tablet, Rfl: 1  •  neomycin-polymyxin-hydrocortisone (CORTISPORIN) 3.5-68612-6 otic solution, ADMINSTER 2 DROPS INTO BOTH EARS 3 TIMES DAILY, Disp: 10 mL, Rfl: 0  •  ondansetron ODT (ZOFRAN-ODT) 4 MG disintegrating tablet, Take 1 tablet by mouth 4 (Four) Times a Day As Needed for Nausea or Vomiting., Disp: 15 tablet, Rfl: 0  •  PARoxetine (PAXIL) 20 MG tablet, Take 1 tablet by mouth Daily., Disp: 90 tablet, Rfl: 1  •  triamcinolone (KENALOG) 0.1 % ointment, Apply  topically 2 (Two) Times a Day As Needed for Irritation., Disp: 30 g, Rfl: 0  •  umeclidinium-vilanterol (ANORO ELLIPTA) 62.5-25 MCG/INH aerosol powder  inhaler, Inhale 1 puff Daily., Disp: 1 each, Rfl: 10  •  vitamin D (ERGOCALCIFEROL) 34703 units capsule capsule, Take 1 capsule by mouth 1 (One) Time Per Week., Disp: 12 capsule, Rfl: 0  •  warfarin (COUMADIN) 3 MG tablet, Take 1.5 tablets by mouth Daily., Disp: 90 tablet, Rfl: 3     Objective     History of Present Illness     Review of Systems   Constitutional: Negative.    HENT: Negative.    Eyes: Negative.    Respiratory: Negative.    Cardiovascular: Negative.    Gastrointestinal: Negative.    Genitourinary: Negative.    Musculoskeletal: Negative.    Skin: Negative.    Neurological: Negative.    Psychiatric/Behavioral: Negative.        Physical Exam   Constitutional: She appears well-developed and well-nourished.   Pleasant, cooperative no distress blood pressure 120/80   HENT:   Head: Normocephalic and atraumatic.   Eyes: Conjunctivae are normal.  Pupils are equal, round, and reactive to light. No scleral icterus.   Neck: Normal range of motion. Neck supple.   Cardiovascular: Normal rate, regular rhythm and normal heart sounds.   Pulmonary/Chest: Effort normal and breath sounds normal. No respiratory distress. She has no wheezes. She has no rales.   Musculoskeletal: Normal range of motion. She exhibits no edema.   Neurological: She is alert.   Skin: Skin is warm and dry.   There is a movable mass in the left hip measuring about 1-1/2 x 3 its probably a fibroma from her previous hematoma there.   Psychiatric: She has a normal mood and affect. Her behavior is normal.   Nursing note and vitals reviewed.      ASSESSMENT INR remains low at 1.9.  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  She has 3 mg tablets and will take 1-1/2 tablets every day, a total of 4.5 mg daily.  I want to recheck her in 2 weeks with a pro time and INR    There are no Patient Instructions on file for this visit.  Return in about 2 weeks (around 5/29/2019).

## 2019-06-05 NOTE — PROGRESS NOTES
Subjective   Jazmine Brown is a 89 y.o. female. Patient is here today for follow-up on her hypertension, history of pulmonary hypertension and cardiomyopathy, hypothyroidism and Coumadin therapy.  She also complains of chronic fatigue.  She has no chest pain, significant shortness of breath or edema.  Patient has been taking 4.5 mg of Coumadin daily  Chief Complaint   Patient presents with   • Anticoagulation          Vitals:    06/05/19 1521   BP: 120/70   Pulse: 88   Resp: 18   Temp: 98 °F (36.7 °C)   SpO2: 92%     The following portions of the patient's history were reviewed and updated as appropriate: allergies, current medications, past family history, past medical history, past social history, past surgical history and problem list.    Past Medical History:   Diagnosis Date   • Polyp, sigmoid colon    • Thyroid cancer (CMS/HCC)       Allergies   Allergen Reactions   • Codeine Hallucinations   • Latex Rash      Social History     Socioeconomic History   • Marital status:      Spouse name: Not on file   • Number of children: 2   • Years of education: Not on file   • Highest education level: Not on file   Occupational History   • Occupation: Lunch Room Lady at [employer]     Employer: RETIRED   Tobacco Use   • Smoking status: Never Smoker   • Smokeless tobacco: Never Used   Substance and Sexual Activity   • Alcohol use: No   • Drug use: No   • Sexual activity: Defer        Current Outpatient Medications:   •  albuterol (ACCUNEB) 1.25 MG/3ML nebulizer solution, Take 3 mL by nebulization Every 4 (Four) Hours As Needed for Wheezing., Disp: 90 mL, Rfl: 12  •  albuterol (ACCUNEB) 1.25 MG/3ML nebulizer solution, Take 3 mL by nebulization Every 4 (Four) Hours As Needed for Wheezing., Disp: 90 vial, Rfl: 2  •  albuterol (PROAIR HFA) 108 (90 Base) MCG/ACT inhaler, Inhale 2 puffs Every 4 (Four) Hours As Needed for Wheezing or Shortness of Air., Disp: 18 g, Rfl: 5  •  bumetanide (BUMEX) 0.5 MG tablet, Take  2 tablets by mouth Daily., Disp: 180 tablet, Rfl: 1  •  carvedilol (COREG) 6.25 MG tablet, TAKE 1 TABLET BY MOUTH TWICE DAILY WITH MEALS, Disp: 180 tablet, Rfl: 1  •  clindamycin (CLEOCIN) 150 MG capsule, , Disp: , Rfl:   •  levothyroxine (SYNTHROID, LEVOTHROID) 75 MCG tablet, Take 1 tablet by mouth Daily., Disp: 90 tablet, Rfl: 1  •  lisinopril (PRINIVIL,ZESTRIL) 5 MG tablet, Take 1 tablet by mouth Daily., Disp: 90 tablet, Rfl: 1  •  neomycin-polymyxin-hydrocortisone (CORTISPORIN) 3.5-38478-2 otic solution, ADMINSTER 2 DROPS INTO BOTH EARS 3 TIMES DAILY, Disp: 10 mL, Rfl: 0  •  ondansetron ODT (ZOFRAN-ODT) 4 MG disintegrating tablet, Take 1 tablet by mouth 4 (Four) Times a Day As Needed for Nausea or Vomiting., Disp: 15 tablet, Rfl: 0  •  PARoxetine (PAXIL) 20 MG tablet, Take 1 tablet by mouth Daily., Disp: 90 tablet, Rfl: 1  •  triamcinolone (KENALOG) 0.1 % ointment, Apply  topically 2 (Two) Times a Day As Needed for Irritation., Disp: 30 g, Rfl: 0  •  umeclidinium-vilanterol (ANORO ELLIPTA) 62.5-25 MCG/INH aerosol powder  inhaler, Inhale 1 puff Daily., Disp: 1 each, Rfl: 10  •  vitamin D (ERGOCALCIFEROL) 10828 units capsule capsule, Take 1 capsule by mouth 1 (One) Time Per Week., Disp: 12 capsule, Rfl: 0  •  warfarin (COUMADIN) 5 MG tablet, Take 1 tablet by mouth Every Night., Disp: 30 tablet, Rfl: 6     Objective     History of Present Illness     Review of Systems   Constitutional: Positive for fatigue.   HENT: Negative.    Eyes: Negative.    Respiratory: Negative.    Cardiovascular: Negative for leg swelling.   Gastrointestinal: Negative.    Genitourinary: Negative.    Musculoskeletal: Negative.    Neurological: Negative.    Psychiatric/Behavioral: Negative.        Physical Exam   Constitutional: She appears well-developed and well-nourished.   Pleasant, cooperative no distress   HENT:   Head: Normocephalic and atraumatic.   Neck: Normal range of motion.   Cardiovascular: Normal rate and normal heart sounds.    Pulmonary/Chest: Effort normal and breath sounds normal. No respiratory distress. She has no wheezes. She has no rales.   Musculoskeletal: Normal range of motion. She exhibits no edema.   Neurological: She is alert.   Skin: Skin is warm and dry.   Psychiatric: She has a normal mood and affect. Her behavior is normal.   Nursing note and vitals reviewed.      ASSESSMENT INR was still low at 1.9.  Patient is having some increased fatigue today.  Her blood pressure is well controlled on lungs sound clear.  Rate is controlled.     Problem List Items Addressed This Visit        Cardiovascular and Mediastinum    Atrial fibrillation (CMS/HCC)    Essential hypertension - Primary    Relevant Orders    CBC & Differential    Basic Metabolic Panel       Endocrine    Hypothyroidism    Relevant Orders    TSH    T4, Free       Other    Chronic fatigue    Relevant Orders    CBC & Differential    Long term current use of anticoagulant    Relevant Orders    CBC & Differential          PLAN checking his CBC, BMP and TSH and free T4 today because of her complaints of malaise and fatigue.  I am increasing her Coumadin to 5 mg daily and need to recheck her in 2 weeks with a pro time and INR    There are no Patient Instructions on file for this visit.  Return in about 2 weeks (around 6/19/2019).

## 2019-06-07 NOTE — TELEPHONE ENCOUNTER
CALLED AND S/W PT'S SON PRISCILLA AND ADVISED WHAT DR. LAKE SAID.  HE VOICED UNDERSTANDING. RX HAS BEEN SENT TO THE PHARMACY FOR PATIENT. KMJ.     ----- Message from Gerard Lake MD sent at 6/6/2019 10:47 AM EDT -----  Her thyroid was better but still low.  I would like to increase her to levothyroxine 100 mcg daily and we will need to recheck her thyroid status in 1 month with a TSH and free T4      ----- Message -----  From: Interface, Reflab Results In  Sent: 6/6/2019   3:07 AM  To: Gerard Lake MD

## 2019-07-03 NOTE — PROGRESS NOTES
Subjective   Jazmine Brown is a 89 y.o. female. Patient is here today for follow-up on her Coumadin therapy and INR.  She also has hypertension and atrial fibrillation.  She generally feels okay.  She is sleeping better using just a half of a 50 mg trazodone.  She has had no chest pain or other acute symptoms.  Her weight overall is pretty stable.  Chief Complaint   Patient presents with   • Anticoagulation   • Rectal Bleeding   • Vaginal Bleeding          Vitals:    07/03/19 1526   BP: 110/60   Pulse: 65   Resp: 16   Temp: 97.7 °F (36.5 °C)   SpO2: 98%     The following portions of the patient's history were reviewed and updated as appropriate: allergies, current medications, past family history, past medical history, past social history, past surgical history and problem list.    Past Medical History:   Diagnosis Date   • Polyp, sigmoid colon    • Thyroid cancer (CMS/HCC)       Allergies   Allergen Reactions   • Codeine Hallucinations   • Latex Rash      Social History     Socioeconomic History   • Marital status:      Spouse name: Not on file   • Number of children: 2   • Years of education: Not on file   • Highest education level: Not on file   Occupational History   • Occupation: Lunch Room Lady at [employer]     Employer: RETIRED   Tobacco Use   • Smoking status: Never Smoker   • Smokeless tobacco: Never Used   Substance and Sexual Activity   • Alcohol use: No   • Drug use: No   • Sexual activity: Defer        Current Outpatient Medications:   •  albuterol (ACCUNEB) 1.25 MG/3ML nebulizer solution, Take 3 mL by nebulization Every 4 (Four) Hours As Needed for Wheezing., Disp: 90 vial, Rfl: 2  •  albuterol (PROAIR HFA) 108 (90 Base) MCG/ACT inhaler, Inhale 2 puffs Every 4 (Four) Hours As Needed for Wheezing or Shortness of Air., Disp: 18 g, Rfl: 5  •  bumetanide (BUMEX) 0.5 MG tablet, Take 2 tablets by mouth Daily., Disp: 180 tablet, Rfl: 1  •  carvedilol (COREG) 6.25 MG tablet, TAKE 1 TABLET BY  MOUTH TWICE DAILY WITH MEALS, Disp: 180 tablet, Rfl: 1  •  clindamycin (CLEOCIN) 150 MG capsule, , Disp: , Rfl:   •  levothyroxine (SYNTHROID) 100 MCG tablet, Take 1 tablet by mouth Daily., Disp: 30 tablet, Rfl: 1  •  lisinopril (PRINIVIL,ZESTRIL) 5 MG tablet, Take 1 tablet by mouth Daily., Disp: 90 tablet, Rfl: 1  •  neomycin-polymyxin-hydrocortisone (CORTISPORIN) 3.5-78575-6 otic solution, ADMINSTER 2 DROPS INTO BOTH EARS 3 TIMES DAILY, Disp: 10 mL, Rfl: 0  •  ondansetron ODT (ZOFRAN-ODT) 4 MG disintegrating tablet, Take 1 tablet by mouth 4 (Four) Times a Day As Needed for Nausea or Vomiting., Disp: 15 tablet, Rfl: 0  •  PARoxetine (PAXIL) 20 MG tablet, Take 1 tablet by mouth Daily., Disp: 90 tablet, Rfl: 1  •  traZODone (DESYREL) 50 MG tablet, Take 0.5 tablets by mouth Every Night., Disp: 30 tablet, Rfl: 3  •  triamcinolone (KENALOG) 0.1 % ointment, Apply  topically 2 (Two) Times a Day As Needed for Irritation., Disp: 30 g, Rfl: 0  •  umeclidinium-vilanterol (ANORO ELLIPTA) 62.5-25 MCG/INH aerosol powder  inhaler, Inhale 1 puff Daily., Disp: 1 each, Rfl: 10  •  vitamin D (ERGOCALCIFEROL) 69569 units capsule capsule, Take 1 capsule by mouth 1 (One) Time Per Week., Disp: 12 capsule, Rfl: 0  •  warfarin (COUMADIN) 2 MG tablet, Take 1 tab every other day along with 2.5 mg warfarin, Disp: 30 tablet, Rfl: 5  •  warfarin (COUMADIN) 2.5 MG tablet, Take every other day along with 2 mg warfarin, Disp: 30 tablet, Rfl: 5  •  warfarin (COUMADIN) 5 MG tablet, Take every other day, Disp: 30 tablet, Rfl: 6     Objective     History of Present Illness     Review of Systems   Constitutional: Negative.    HENT: Negative.    Eyes: Negative.    Respiratory: Negative.    Cardiovascular: Negative.    Gastrointestinal: Negative.    Genitourinary: Negative.    Musculoskeletal: Negative.    Skin: Negative.    Neurological: Negative.    Psychiatric/Behavioral: Negative.        Physical Exam   Constitutional: She is oriented to person,  place, and time. She appears well-developed and well-nourished.   Pleasant, cooperative no distress, blood pressure 120/80   HENT:   Head: Normocephalic and atraumatic.   Eyes: Conjunctivae are normal. Pupils are equal, round, and reactive to light. No scleral icterus.   Cardiovascular: Normal rate and normal heart sounds.   Rate controlled at about 70-80   Pulmonary/Chest: Effort normal and breath sounds normal. No respiratory distress. She has no wheezes. She has no rales.   Musculoskeletal: Normal range of motion.   Neurological: She is alert and oriented to person, place, and time.   Skin: Skin is warm and dry.   Psychiatric: She has a normal mood and affect. Her behavior is normal.   Nursing note and vitals reviewed.      ASSESSMENT patient's INR was therapeutic at 2.1.  Blood pressure is well controlled and her atrial fibrillation is rate controlled.  1-Coumadin therapy  #2-hypertension controlled  #3-atrial fibrillation, rate controlled     Problem List Items Addressed This Visit        Cardiovascular and Mediastinum    Atrial fibrillation (CMS/HCC)    Essential hypertension - Primary       Other    Long term current use of anticoagulant          PLAN the patient will continue her Coumadin alternating 4.5 with 5 mg every other day.  She will continue other medicines as now.  I want to recheck her in 1 month with a CBC, CMP, TSH and free T4, serum iron and TIBC and pro time and INR and hemoglobin A1c    There are no Patient Instructions on file for this visit.  Return in about 1 month (around 8/3/2019) for with labs.

## 2019-08-08 NOTE — TELEPHONE ENCOUNTER
----- Message from Gerard Jama MD sent at 8/8/2019 12:03 PM EDT -----  Contact: PATIENT  I sent her in a prescription for Septra double strength yesterday  ----- Message -----  From: Loli Huston MA  Sent: 8/7/2019  11:45 AM  To: MD DR. SAHIL Spence PT CAME IN FOR HER BLOODWORK TODAY, AND WHILE HERE SHE TOLD STEVE SHE THOUGHT SHE HAD A UTI. SHE LEFT A URINE SAMPLE AND IS WONDERING IF YOU CAN CALL SOMETHING IN IF SHE HAS A UTI? PLEASE ADVISE. THANK YOU. RESULTS OF URINE ARE ON YOUR DESK AND STEVE SAID THERE IS NOT ENOUGH FOR A CULTURE. SORRY.

## 2019-08-09 NOTE — PROGRESS NOTES
CALLED PATIENT, SPOKE TO DAUGHTER, PER DR BURNING HER INR IS TOO HIGH, SHE NEEDS TO STOP WARFARIN AND COME IN FOR PT INR ON Monday.   SHE WILL CALL BACK AND SET UP A TIME.
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
## 2019-08-13 PROBLEM — Z79.01 LONG TERM CURRENT USE OF ANTICOAGULANT: Status: RESOLVED | Noted: 2018-11-28 | Resolved: 2019-01-01

## 2019-08-13 NOTE — PROGRESS NOTES
Subjective   Jazmine Brown is a 89 y.o. female. Patient is here today for a family conference.  Patient has problems of hypertension, chronic atrial fibrillation, hypothyroidism.  She has been on Coumadin.  Her pro time got very elevated and that has been discontinued.  Family accompanies the patient.  They report that she is been falling frequently at home, 3 times in the past week and has obvious bruising on her legs and apparently on both hips.  There have been no fractures but she is a severe fall risk.  She has not been eating as well and is generally weak.  They also report some occasional hallucinations at home although the patient seems lucid currently  Chief Complaint   Patient presents with   • Fall     HALLUCINATIONS- PT HERE FOR FOLLOW UP LABS, GO OVER PT/INR, AND CHECK URINE          Vitals:    08/13/19 1013   BP: 120/62   Pulse: 77   Resp: 16   Temp: 98.8 °F (37.1 °C)   SpO2: 90%     The following portions of the patient's history were reviewed and updated as appropriate: allergies, current medications, past family history, past medical history, past social history, past surgical history and problem list.    Past Medical History:   Diagnosis Date   • Polyp, sigmoid colon    • Thyroid cancer (CMS/HCC)       Allergies   Allergen Reactions   • Codeine Hallucinations   • Latex Rash      Social History     Socioeconomic History   • Marital status:      Spouse name: Not on file   • Number of children: 2   • Years of education: Not on file   • Highest education level: Not on file   Occupational History   • Occupation: Lunch Room Lady at [employer]     Employer: RETIRED   Tobacco Use   • Smoking status: Never Smoker   • Smokeless tobacco: Never Used   Substance and Sexual Activity   • Alcohol use: No   • Drug use: No   • Sexual activity: Defer        Current Outpatient Medications:   •  albuterol (ACCUNEB) 1.25 MG/3ML nebulizer solution, Take 3 mL by nebulization Every 4 (Four) Hours As  Needed for Wheezing., Disp: 90 vial, Rfl: 2  •  albuterol (PROAIR HFA) 108 (90 Base) MCG/ACT inhaler, Inhale 2 puffs Every 4 (Four) Hours As Needed for Wheezing or Shortness of Air., Disp: 18 g, Rfl: 5  •  bumetanide (BUMEX) 0.5 MG tablet, Take 2 tablets by mouth Daily., Disp: 180 tablet, Rfl: 1  •  carvedilol (COREG) 6.25 MG tablet, TAKE 1 TABLET BY MOUTH TWICE DAILY WITH MEALS, Disp: 180 tablet, Rfl: 1  •  levothyroxine (SYNTHROID) 100 MCG tablet, Take 1 tablet by mouth Daily., Disp: 30 tablet, Rfl: 1  •  lisinopril (PRINIVIL,ZESTRIL) 5 MG tablet, Take 1 tablet by mouth Daily., Disp: 90 tablet, Rfl: 1  •  neomycin-polymyxin-hydrocortisone (CORTISPORIN) 3.5-83460-4 otic solution, ADMINSTER 2 DROPS INTO BOTH EARS 3 TIMES DAILY, Disp: 10 mL, Rfl: 0  •  ondansetron ODT (ZOFRAN-ODT) 4 MG disintegrating tablet, Take 1 tablet by mouth 4 (Four) Times a Day As Needed for Nausea or Vomiting., Disp: 15 tablet, Rfl: 0  •  PARoxetine (PAXIL) 20 MG tablet, Take 1 tablet by mouth Daily., Disp: 90 tablet, Rfl: 1  •  sulfamethoxazole-trimethoprim (BACTRIM DS) 800-160 MG per tablet, Take 1 tablet by mouth 2 (Two) Times a Day., Disp: 14 tablet, Rfl: 0  •  traZODone (DESYREL) 50 MG tablet, Take 0.5 tablets by mouth Every Night., Disp: 30 tablet, Rfl: 3  •  triamcinolone (KENALOG) 0.1 % ointment, Apply  topically 2 (Two) Times a Day As Needed for Irritation., Disp: 30 g, Rfl: 0  •  umeclidinium-vilanterol (ANORO ELLIPTA) 62.5-25 MCG/INH aerosol powder  inhaler, Inhale 1 puff Daily., Disp: 1 each, Rfl: 10  •  vitamin D (ERGOCALCIFEROL) 09915 units capsule capsule, Take 1 capsule by mouth 1 (One) Time Per Week., Disp: 12 capsule, Rfl: 0  •  warfarin (COUMADIN) 2 MG tablet, Take 1 tab every other day along with 2.5 mg warfarin, Disp: 30 tablet, Rfl: 5  •  warfarin (COUMADIN) 2.5 MG tablet, Take every other day along with 2 mg warfarin, Disp: 30 tablet, Rfl: 5  •  warfarin (COUMADIN) 5 MG tablet, Take every other day, Disp: 30 tablet,  Rfl: 6     Objective     History of Present Illness     Review of Systems   Constitutional: Negative.    HENT: Negative.    Eyes: Negative.    Respiratory: Negative.    Cardiovascular: Negative.    Gastrointestinal: Negative.    Genitourinary: Negative.    Musculoskeletal:        Bruising on the legs and hips   Skin: Negative.    Neurological: Negative.    Psychiatric/Behavioral: Negative.        Physical Exam   Constitutional: She appears well-developed and well-nourished.   Pleasant, cooperative in a wheelchair.   HENT:   Head: Normocephalic and atraumatic.   Eyes: Pupils are equal, round, and reactive to light. No scleral icterus.   There is a conjunctival hemorrhage in the right eye   Neck: Normal range of motion. Neck supple.   Cardiovascular: Normal rate.   Pulmonary/Chest: Effort normal and breath sounds normal. No respiratory distress. She has no wheezes. She has no rales.   Abdominal: Soft. Bowel sounds are normal.   Musculoskeletal: Normal range of motion.   Marked bruising on the legs from the knee down   Neurological: She is alert.   Skin: Skin is warm and dry.   Psychiatric: She has a normal mood and affect. Her behavior is normal.   Nursing note and vitals reviewed.      ASSESSMENT CBC was essentially normal aside from some macrocytosis.  Serum iron and TIBC were normal.  TSH and free T4 are normal.  CMP had a sugar of 102 and was otherwise essentially normal.  INR yesterday was quite high at 6.4.  Today it is decreased to 4.8  #1-atrial fibrillation, chronic  #2-hypertension, controlled  #3-hypothyroidism, euthyroid on replacement  #4-occasional hallucinations according to the family  #5-frequent falling at home  After discussion with the family concerning the pros and cons, because of the fall risk, the widespread bruising and frequent falls were going to stop the Coumadin.  Family is well aware that this puts the patient at a higher stroke risk.     Problem List Items Addressed This Visit         Cardiovascular and Mediastinum    Atrial fibrillation (CMS/HCC) - Primary    Relevant Orders    POC INR (Completed)          PLAN I am going to try and get home health to evaluate the patient for physical therapy because of her fall risk.  The Coumadin has been discontinued as well as the trazodone.  Patient will use the Bumex as needed.  I would like to recheck her in a month with a CBC, CMP, TSH vitamin B12 and folic acid levels    There are no Patient Instructions on file for this visit.  No Follow-up on file.

## 2019-08-13 NOTE — TELEPHONE ENCOUNTER
-----  Left message to return call     Message from Katerina Stoll sent at 8/12/2019  9:43 AM EDT -----  Contact: SON BERNARD MOLINA- HE IS ON  RELEASE INFO  [phone]   SHE HAS APT 8/14 / 19 TO FU ON LABS- BUT THERE HAS BEEN SOME DRAMATIC CHANGES IN HER IN LAST 3 DAY HAS HAD A FALL AND IS HALLINCINATING AND HE WOULD LIKE TO EITHER HAVE DR LAKE CALL HIM - JUST HAS A COUPLE OF QUESTIONS- OR SEE IF HE CAN COME IN THE 1ST 5 MIN OF HER APPT  ON WED TO TALK TO DR LAKE- WANTS TO TALK WITHOUT HER IN THE ROOM    PLEASE LET HIM KNOW HOW THIS CAN BEST  BE HANDLED- I TOLD HIM DR LAKE IS NOT HERE TODAY SO WILL BE AT LEAST TOMORROW BEFORE HE HEARS ANYTHING

## 2019-08-14 NOTE — TELEPHONE ENCOUNTER
SENT RX'S TO PHARMACY FOR PATIENT.     ----- Message from Callie Paredes sent at 8/14/2019 12:52 PM EDT -----  REFILL ON:    umeclidinium-vilanterol (ANORO ELLIPTA) 62.5-25 MCG/INH aerosol powder  inhaler   Dose: 1 puff Dispense Quantity: 1 each           Sig: Inhale 1 puff Daily.    albuterol (ACCUNEB) 1.25 MG/3ML nebulizer solution   Dose: 1 ampule Dispense Quantity: 90 vial          Sig: Take 3 mL by nebulization Every 4 (Four) Hours As Needed for Wheezing.          [facility] [place]    THANK YOU

## 2019-08-21 PROBLEM — D64.9 ANEMIA: Status: ACTIVE | Noted: 2019-01-01

## 2019-08-21 PROBLEM — J96.01 ACUTE RESPIRATORY FAILURE WITH HYPOXIA (HCC): Status: RESOLVED | Noted: 2017-09-14 | Resolved: 2019-01-01

## 2019-08-21 NOTE — PROGRESS NOTES
Subjective   Jazmine Brown is a 89 y.o. female. Patient is here today for a checkup following at least 2 falls at home out of bed.  Patient is complaining of back pain in the thoracic and lumbar areas with movements and also has pain around the lower ribs on both sides.  She is breathing okay and has had minimal abdominal complaints.  She is off Coumadin unfortunately.  Chief Complaint   Patient presents with   • Fall     PT HAS FALLEN OUT OF BED TWICE SINCE LAST APPT- SORE AND HURTING IN STOMACH AND BACK           Vitals:    08/21/19 0915   BP: 96/58   Pulse: 82   Resp: 18   Temp: 98.3 °F (36.8 °C)   SpO2: 97%     The following portions of the patient's history were reviewed and updated as appropriate: allergies, current medications, past family history, past medical history, past social history, past surgical history and problem list.    Past Medical History:   Diagnosis Date   • Polyp, sigmoid colon    • Thyroid cancer (CMS/HCC)       Allergies   Allergen Reactions   • Codeine Hallucinations   • Latex Rash      Social History     Socioeconomic History   • Marital status:      Spouse name: Not on file   • Number of children: 2   • Years of education: Not on file   • Highest education level: Not on file   Occupational History   • Occupation: Lunch Room Lady at [employer]     Employer: RETIRED   Tobacco Use   • Smoking status: Never Smoker   • Smokeless tobacco: Never Used   Substance and Sexual Activity   • Alcohol use: No   • Drug use: No   • Sexual activity: Defer        Current Outpatient Medications:   •  albuterol (ACCUNEB) 1.25 MG/3ML nebulizer solution, Take 3 mL by nebulization Every 4 (Four) Hours As Needed for Wheezing., Disp: 90 vial, Rfl: 2  •  albuterol (PROAIR HFA) 108 (90 Base) MCG/ACT inhaler, Inhale 2 puffs Every 4 (Four) Hours As Needed for Wheezing or Shortness of Air., Disp: 18 g, Rfl: 5  •  bumetanide (BUMEX) 0.5 MG tablet, Take 2 tablets by mouth Daily., Disp: 180 tablet,  Rfl: 1  •  carvedilol (COREG) 6.25 MG tablet, TAKE 1 TABLET BY MOUTH TWICE DAILY WITH MEALS, Disp: 180 tablet, Rfl: 1  •  levothyroxine (SYNTHROID) 100 MCG tablet, Take 1 tablet by mouth Daily., Disp: 30 tablet, Rfl: 1  •  lisinopril (PRINIVIL,ZESTRIL) 5 MG tablet, Take 1 tablet by mouth Daily., Disp: 90 tablet, Rfl: 1  •  neomycin-polymyxin-hydrocortisone (CORTISPORIN) 3.5-39237-4 otic solution, ADMINSTER 2 DROPS INTO BOTH EARS 3 TIMES DAILY, Disp: 10 mL, Rfl: 0  •  ondansetron ODT (ZOFRAN-ODT) 4 MG disintegrating tablet, Take 1 tablet by mouth 4 (Four) Times a Day As Needed for Nausea or Vomiting., Disp: 15 tablet, Rfl: 0  •  PARoxetine (PAXIL) 20 MG tablet, Take 1 tablet by mouth Daily., Disp: 90 tablet, Rfl: 1  •  triamcinolone (KENALOG) 0.1 % ointment, Apply  topically 2 (Two) Times a Day As Needed for Irritation., Disp: 30 g, Rfl: 0  •  umeclidinium-vilanterol (ANORO ELLIPTA) 62.5-25 MCG/INH aerosol powder  inhaler, Inhale 1 puff Daily., Disp: 1 each, Rfl: 10  •  vitamin D (ERGOCALCIFEROL) 64600 units capsule capsule, Take 1 capsule by mouth 1 (One) Time Per Week., Disp: 12 capsule, Rfl: 0     Objective     History of Present Illness     Review of Systems   Constitutional: Negative.    HENT: Negative.    Eyes: Negative.    Respiratory: Negative.    Cardiovascular: Positive for chest pain.   Gastrointestinal: Negative.  Negative for abdominal distention, abdominal pain, blood in stool and nausea.   Genitourinary: Negative.    Musculoskeletal: Positive for back pain.   Skin: Negative.    Neurological: Negative.    Psychiatric/Behavioral: Negative.        Physical Exam   Constitutional: She appears well-developed.   Pleasant but in obvious pain when sitting or moving   HENT:   Head: Normocephalic and atraumatic.   Eyes: Conjunctivae are normal. Pupils are equal, round, and reactive to light. No scleral icterus.   Neck: Normal range of motion.   Cardiovascular: Normal rate, regular rhythm and normal heart sounds.    Pulmonary/Chest: Effort normal and breath sounds normal. No respiratory distress. She has no wheezes. She has no rales.   Abdominal: Soft. Bowel sounds are normal. She exhibits no distension and no mass. There is no tenderness. There is no guarding.   Musculoskeletal: She exhibits tenderness.   Tenderness in the thoracic and upper lumbar areas of the spine with increased pain with movements.  Also some tenderness along the lower ribs bilaterally   Neurological: She is alert.   Skin: Skin is warm and dry.   Psychiatric: She has a normal mood and affect. Her behavior is normal.   Nursing note and vitals reviewed.      ASSESSMENT CBC has a very mild anemia that somewhat new with marked macrocytosis.  Her abdominal exam is not impressive and certainly does not indicate any kind of internal bleeding.  She does have pain with movements and certainly could have some fractures.  #1-fall x2 at home with thoracic and lumbar back pain and bilateral rib pain  #2-macrocytic anemia, mild     Problem List Items Addressed This Visit        Cardiovascular and Mediastinum    Heart failure, acute on chronic, systolic and diastolic (CMS/HCC)       Respiratory    RESOLVED: Acute respiratory failure with hypoxia (CMS/HCC)       Hematopoietic and Hemostatic    Anemia    Relevant Orders    Iron Profile    Vitamin B12    Folate       Other    Fall at home - Primary    Relevant Orders    XR Spine Lumbar AP & Lateral    XR Chest PA & Lateral    XR spine thoracic 3 vw      Other Visit Diagnoses     Acute midline low back pain without sciatica        Relevant Orders    XR Spine Lumbar AP & Lateral    XR Chest PA & Lateral    XR spine thoracic 3 vw          PLAN I have ordered vitamin B12 and folic acid and serum iron and TIBC to be drawn.  Also ordered x-rays of the thoracic and lumbar spines and the chest x-ray.  Assuming they are okay the patient can continue Tylenol and I would like to recheck her in about 2 weeks with a repeat CBC  then    There are no Patient Instructions on file for this visit.  Return in about 2 weeks (around 9/4/2019) for with labs.

## 2019-08-29 NOTE — TELEPHONE ENCOUNTER
Pt is on 3 liters of o2 per [employer] called and left vm to call me back.  Jory called back and I spoke to her and let her know it is 3 liters     ----- Message from Qing Mishra sent at 8/29/2019  1:49 PM EDT -----      ----- Message -----  From: Qing Mishra  Sent: 8/29/2019   1:47 PM  To: VALENTINE Hummel FROM  WITH [facility] CALLING ABOUT     TRACEY MOLINA       NEEDS TO CLARIFY HER OXYGEN  DOSAGE       PLEASE CALL JORY   [phone]  FYLUIS       SHE IS HAVING PT 2 TIMES A WEEK FOR 3 WEEKS

## 2019-08-29 NOTE — TELEPHONE ENCOUNTER
Advised with Dr Jama and he said the pt's son could call the oxygen company. Called son and let him know. Pt's son called [employer] to find out and pt is on 3 liters of o2     ----- Message from Rachel Chicas sent at 8/29/2019 12:57 PM EDT -----  PT IS ON OXYGEN CONCENTATOR WHAT SHOULD IT BE SET ON?    BERNARD MOLINA HER SON PLEASE CALL [phone] AND LET HIM KNOW.  THANK YOU

## 2019-09-04 NOTE — PROGRESS NOTES
Subjective   Jazmine Brown is a 89 y.o. female. Patient is here today for follow-up on her low back pain and mild anemia.  Patient also has a history of atrial fibrillation and since her last visit has improved dramatically.  She is looking much better and her back pain has generally resolved.  Chief Complaint   Patient presents with   • Anemia     FALL AT HOME- PT HERE FOR FOLLOW UP           Vitals:    09/04/19 1017   BP: 110/70   Pulse: 84   Resp: 18   Temp: 97.7 °F (36.5 °C)   SpO2: 96%     The following portions of the patient's history were reviewed and updated as appropriate: allergies, current medications, past family history, past medical history, past social history, past surgical history and problem list.    Past Medical History:   Diagnosis Date   • Polyp, sigmoid colon    • Thyroid cancer (CMS/HCC)       Allergies   Allergen Reactions   • Codeine Hallucinations   • Latex Rash      Social History     Socioeconomic History   • Marital status:      Spouse name: Not on file   • Number of children: 2   • Years of education: Not on file   • Highest education level: Not on file   Occupational History   • Occupation: Lunch Room Lady at [employer]     Employer: RETIRED   Tobacco Use   • Smoking status: Never Smoker   • Smokeless tobacco: Never Used   Substance and Sexual Activity   • Alcohol use: No   • Drug use: No   • Sexual activity: Defer        Current Outpatient Medications:   •  albuterol (ACCUNEB) 1.25 MG/3ML nebulizer solution, Take 3 mL by nebulization Every 4 (Four) Hours As Needed for Wheezing., Disp: 90 vial, Rfl: 2  •  albuterol (PROAIR HFA) 108 (90 Base) MCG/ACT inhaler, Inhale 2 puffs Every 4 (Four) Hours As Needed for Wheezing or Shortness of Air., Disp: 18 g, Rfl: 5  •  bumetanide (BUMEX) 0.5 MG tablet, Take 2 tablets by mouth Daily., Disp: 180 tablet, Rfl: 1  •  carvedilol (COREG) 6.25 MG tablet, TAKE 1 TABLET BY MOUTH TWICE DAILY WITH MEALS, Disp: 180 tablet, Rfl: 1  •   levothyroxine (SYNTHROID) 100 MCG tablet, Take 1 tablet by mouth Daily., Disp: 30 tablet, Rfl: 1  •  lisinopril (PRINIVIL,ZESTRIL) 5 MG tablet, Take 1 tablet by mouth Daily., Disp: 90 tablet, Rfl: 1  •  neomycin-polymyxin-hydrocortisone (CORTISPORIN) 3.5-85649-0 otic solution, ADMINSTER 2 DROPS INTO BOTH EARS 3 TIMES DAILY, Disp: 10 mL, Rfl: 0  •  ondansetron ODT (ZOFRAN-ODT) 4 MG disintegrating tablet, Take 1 tablet by mouth 4 (Four) Times a Day As Needed for Nausea or Vomiting., Disp: 15 tablet, Rfl: 0  •  PARoxetine (PAXIL) 20 MG tablet, Take 1 tablet by mouth Daily., Disp: 90 tablet, Rfl: 1  •  triamcinolone (KENALOG) 0.1 % ointment, Apply  topically 2 (Two) Times a Day As Needed for Irritation., Disp: 30 g, Rfl: 0  •  umeclidinium-vilanterol (ANORO ELLIPTA) 62.5-25 MCG/INH aerosol powder  inhaler, Inhale 1 puff Daily., Disp: 1 each, Rfl: 10  •  vitamin B-12 (CYANOCOBALAMIN) 100 MCG tablet, Take 50 mcg by mouth Daily., Disp: , Rfl:   •  vitamin D (ERGOCALCIFEROL) 00805 units capsule capsule, Take 1 capsule by mouth 1 (One) Time Per Week., Disp: 12 capsule, Rfl: 0     Objective     History of Present Illness     Review of Systems   Constitutional: Negative.    HENT: Negative.    Eyes: Negative.    Respiratory: Negative.    Cardiovascular: Negative.    Gastrointestinal: Negative.    Genitourinary: Negative.    Musculoskeletal: Negative.    Skin: Negative.    Neurological: Negative.    Psychiatric/Behavioral: Negative.        Physical Exam   Constitutional: She appears well-developed and well-nourished.   Pleasant, cooperative no distress and alert with blood pressure 120/80   HENT:   Head: Normocephalic and atraumatic.   Eyes: Conjunctivae are normal. Pupils are equal, round, and reactive to light. No scleral icterus.   Neck: Normal range of motion. Neck supple.   Cardiovascular: Normal rate, regular rhythm and normal heart sounds.   Pulmonary/Chest: Effort normal and breath sounds normal. No respiratory distress.  She has no wheezes. She has no rales.   Musculoskeletal: Normal range of motion.   Neurological: She is alert.   Skin: Skin is warm and dry.   Psychiatric: She has a normal mood and affect. Her behavior is normal.   Nursing note and vitals reviewed.      ASSESSMENT serum iron and TIBC, vitamin B12 and folic acid levels were all normal.  CBC showed a mild anemia and today has a hemoglobin of 9.6 and hematocrit 30.5 and is pretty stable.  Chest x-ray and x-rays of the thoracic and lumbar spine showed marked degenerative changes and scoliosis but no acute fractures and clinically the patient's back pain is resolved.  #1-chronic atrial fibrillation  #2-hypertension  #3-recurrent falls at home  #4-mild anemia, asymptomatic     Problem List Items Addressed This Visit        Cardiovascular and Mediastinum    Atrial fibrillation (CMS/HCC)    Essential hypertension       Hematopoietic and Hemostatic    Anemia - Primary    Relevant Medications    vitamin B-12 (CYANOCOBALAMIN) 100 MCG tablet    Other Relevant Orders    POC CBC (Completed)          PLAN after discussion with the patient and family, we continue to elect not to use anticoagulation because of the severe fall risk.  Patient family are agreeable to this and are well aware of possible stroke risk.  Patient will continue current medicines as now and will take vitamin B12 once a day.  I will recheck her in 4 months with a CBC, CMP, hemoglobin A1c, TSH and free T4    There are no Patient Instructions on file for this visit.  Return in about 4 months (around 1/4/2020) for with labs.

## 2019-09-11 PROBLEM — I50.9 CHF (CONGESTIVE HEART FAILURE) (HCC): Status: ACTIVE | Noted: 2019-01-01

## 2019-09-11 PROBLEM — J96.11 CHRONIC RESPIRATORY FAILURE WITH HYPOXIA (HCC): Status: ACTIVE | Noted: 2019-01-01

## 2019-09-11 NOTE — ED TRIAGE NOTES
[facility] of [facility] and [facility] starting x3 days ago, worsening. Pt was seen at [facility], sent to ED for further eval.

## 2019-09-11 NOTE — ED PROVIDER NOTES
EMERGENCY DEPARTMENT ENCOUNTER    CHIEF COMPLAINT  Chief Complaint: SOA  History given by: Patient and granddaughter  History limited by: Pt is a poor historian   Room Number: S622/1  PMD: Gerard Jama MD      HPI:  Pt is a 89 y.o. female who presents complaining of worsening SOA with associated orthopnea that began two weeks ago. Pt is also c/o non-productive cough, bowel incontinence, diarrhea, and R side pain (from a fall 3 days ago.) Pt denies fever, change in PO intake, vomiting, BLE swelling and abdominal pain. Pt reports SOA is worse with exertion. Pt denies missing any medication doses recently. Pt granddaughter reports pt was seen seen by UC today who advised her to come to the ED. Pt states she is normally on oxygen and albuterol nebulizer at home but she is unsure why and does not see a pulmonologist.     Duration:  2 weeks  Onset: Gradual  Timing: Constant  Location: Respiratory   Radiation: N/a  Quality: SOA  Intensity/Severity: Moderate  Progression: Unchanged   Associated Symptoms: Orthopnea, non-productive cough, bowel incontinence, diarrhea, and R side pain (from fall 3 days ago)  Aggravating Factors: Laying supine, exertion  Alleviating Factors: None  Previous Episodes: Pt reports using oxygen and albuterol nebulizer for SOA chronically   Treatment before arrival: Oxygen, albuterol nebulizer     PAST MEDICAL HISTORY  Active Ambulatory Problems     Diagnosis Date Noted   • Fatigue 04/01/2016   • Hypothyroidism 04/01/2016   • Atrial fibrillation (CMS/HCC) 04/01/2016   • Acute cystitis without hematuria 08/24/2016   • Shortness of breath 07/26/2017   • Heart failure, acute on chronic, systolic and diastolic (CMS/HCC) 10/18/2017   • Acute contact otitis externa of left ear 01/17/2018   • Essential hypertension 08/15/2018   • Chronic fatigue 08/15/2018   • Cardiac pacemaker in situ 04/29/2014   • Cardiomyopathy (CMS/HCC) 03/17/2016   • Moderate to severe pulmonary hypertension (CMS/HCC)  09/14/2017   • Hematoma 03/06/2019   • Major depressive disorder 04/18/2019   • Fall at home 08/13/2019   • Anemia 08/21/2019     Resolved Ambulatory Problems     Diagnosis Date Noted   • Acute respiratory failure with hypoxia (CMS/HCC) 09/14/2017   • Long term current use of anticoagulant 11/28/2018     Past Medical History:   Diagnosis Date   • A-fib (CMS/HCC)    • CHF (congestive heart failure) (CMS/HCC)    • Depression    • Hypertension    • Polyp, sigmoid colon    • Pulmonary hypertension (CMS/HCC)    • Thyroid cancer (CMS/HCC)        PAST SURGICAL HISTORY  Past Surgical History:   Procedure Laterality Date   • CARDIAC PACEMAKER PLACEMENT     • HYSTERECTOMY     • PACEMAKER IMPLANTATION     • TONSILLECTOMY AND ADENOIDECTOMY     • TOTAL THYROIDECTOMY         FAMILY HISTORY  Family History   Problem Relation Age of Onset   • Heart disease Mother    • Hypertension Father         benign essential hypertension       SOCIAL HISTORY  Social History     Socioeconomic History   • Marital status:      Spouse name: Not on file   • Number of children: 2   • Years of education: Not on file   • Highest education level: Not on file   Occupational History   • Occupation: Lunch Room Lady at [employer]     Employer: RETIRED   Tobacco Use   • Smoking status: Never Smoker   • Smokeless tobacco: Never Used   Substance and Sexual Activity   • Alcohol use: No   • Drug use: No   • Sexual activity: Defer       ALLERGIES  Codeine and Latex    REVIEW OF SYSTEMS  Review of Systems   Constitutional: Negative for appetite change and fever.   Respiratory: Positive for shortness of breath (with assoicated orthopnea).    Cardiovascular: Negative for leg swelling.   Gastrointestinal: Positive for diarrhea. Negative for abdominal pain and vomiting.        Bowel incontinence   Musculoskeletal:        R side pain   All other systems reviewed and are negative.      PHYSICAL EXAM  ED Triage Vitals   Temp Heart Rate Resp BP SpO2    09/11/19 1223 09/11/19 1223 09/11/19 1223 09/11/19 1248 09/11/19 1223   96.5 °F (35.8 °C) 79 20 139/73 92 %      Temp src Heart Rate Source Patient Position BP Location FiO2 (%)   09/11/19 1223 09/11/19 1223 -- -- --   Tympanic Monitor          Physical Exam   Constitutional: She is oriented to person, place, and time. No distress.   Elderly, frail, chronically ill appearing, weak   HENT:   Head: Normocephalic and atraumatic.   Mouth/Throat: Oropharynx is clear and moist.   Eyes: Conjunctivae and EOM are normal. Pupils are equal, round, and reactive to light.   Neck: Normal range of motion. Neck supple.   Cardiovascular: Normal rate, regular rhythm and normal heart sounds.   No murmur heard.  Pulmonary/Chest: Tachypnea noted. She is in respiratory distress (mild). She has wheezes (expiratory). She has no rhonchi. She has rales (crackles in bases).   O2 on 3L is 100%, adjusted to 2L, decreased air movement    Abdominal: Soft. There is no tenderness. There is no rebound and no guarding.   Musculoskeletal: Normal range of motion. She exhibits no edema.   1+ edema to BLE, good distal pulses   Neurological: She is alert and oriented to person, place, and time. She has normal sensation and normal strength.   Skin: Skin is warm and dry. No rash noted.   Psychiatric: Mood and affect normal.   Nursing note and vitals reviewed.      LAB RESULTS  Lab Results (last 24 hours)     Procedure Component Value Units Date/Time    CBC & Differential [[id number]] Collected:  09/11/19 1257    Specimen:  Blood Updated:  09/11/19 1405    Narrative:       The following orders were created for panel order CBC & Differential.  Procedure                               Abnormality         Status                     ---------                               -----------         ------                     CBC Auto Differential[[id number]]        Abnormal            Final result                 Please view results for these tests on the individual  orders.    Comprehensive Metabolic Panel [[id number]]  (Abnormal) Collected:  09/11/19 1257    Specimen:  Blood Updated:  09/11/19 1333     Glucose 87 mg/dL      BUN 13 mg/dL      Creatinine 0.83 mg/dL      Sodium 141 mmol/L      Potassium 4.9 mmol/L      Chloride 101 mmol/L      CO2 32.0 mmol/L      Calcium 8.9 mg/dL      Total Protein 6.7 g/dL      Albumin 3.90 g/dL      ALT (SGPT) 7 U/L      AST (SGOT) 24 U/L      Alkaline Phosphatase 81 U/L      Total Bilirubin 0.4 mg/dL      eGFR Non African Amer 64 mL/min/1.73      Globulin 2.8 gm/dL      A/G Ratio 1.4 g/dL      BUN/Creatinine Ratio 15.7     Anion Gap 8.0 mmol/L     Narrative:       GFR Normal >60  Chronic Kidney Disease <60  Kidney Failure <15    BNP [[id number]]  (Abnormal) Collected:  09/11/19 1257    Specimen:  Blood Updated:  09/11/19 1329     proBNP 3,238.0 pg/mL     Narrative:       Among patients with dyspnea, NT-proBNP is highly sensitive for the detection of acute congestive heart failure. In addition NT-proBNP of <300 pg/ml effectively rules out acute congestive heart failure with 99% negative predictive value.    Troponin [[id number]]  (Normal) Collected:  09/11/19 1257    Specimen:  Blood Updated:  09/11/19 1333     Troponin T <0.010 ng/mL     Narrative:       Troponin T Reference Range:  <= 0.03 ng/mL-   Negative for AMI  >0.03 ng/mL-     Abnormal for myocardial necrosis.  Clinicians would have to utilize clinical acumen, EKG, Troponin and serial changes to determine if it is an Acute Myocardial Infarction or myocardial injury due to an underlying chronic condition.     CBC Auto Differential [[id number]]  (Abnormal) Collected:  09/11/19 1257    Specimen:  Blood Updated:  09/11/19 1405     WBC 6.74 10*3/mm3      RBC 2.70 10*6/mm3      Hemoglobin 10.0 g/dL      Hematocrit 33.7 %      .8 fL      MCH 37.0 pg      MCHC 29.7 g/dL      RDW 19.7 %      RDW-SD 91.6 fl      MPV 11.4 fL      Platelets 251 10*3/mm3      nRBC 0.0 /100 WBC     Manual  Differential [[id number]]  (Abnormal) Collected:  09/11/19 1257    Specimen:  Blood Updated:  09/11/19 1405     Neutrophil % 93.0 %      Lymphocyte % 5.0 %      Monocyte % 2.0 %      Neutrophils Absolute 6.27 10*3/mm3      Lymphocytes Absolute 0.34 10*3/mm3      Monocytes Absolute 0.13 10*3/mm3      Anisocytosis Slight/1+     Macrocytes Mod/2+     Spherocytes Slight/1+     WBC Morphology Normal     Platelet Morphology Normal          I ordered the above labs and reviewed the results    RADIOLOGY  XR Chest 2 View   Final Result   Mild likely atelectasis in the right lower lung.   Cardiomegaly. Tortuous aorta. Follow-up as clinically indicated.       This report was finalized on 9/11/2019 1:19 PM by Dr. Jaret Watson M.D.               I ordered the above noted radiological studies. Interpreted by radiologist. Reviewed by me in PACS.       PROCEDURES  Procedures     EKG                                 EKG time: 1248  Rhythm/Rate: Rate 75, paced rhythm  Underlying Afib     Interpreted Contemporaneously by me, independently viewed and un changed compared to prior on 10/22/19        PROGRESS AND CONSULTS  ED Course as of Sep 11 2232   Wed Sep 11, 2019   1436 14 months ago patient's BNP was 1400 proBNP: (!) 3,238.0 [MM]   1437 1 month ago patient's hemoglobin was 12 area she does not report any black stool or blood in her stool. Hemoglobin: (!) 10.0 [MM]      ED Course User Index  [MM] Christos Vegas MD     1714: Spoke with  ([facility]) who agreed to admit    1727: Rechecked pt who is resting comfortably and in NAD. On re-exam, pt is breathing better than initial exam. Discussed plan to admit. Pt understands and agrees with the plan, all questions answered.      MEDICAL DECISION MAKING  Results were reviewed/discussed with the patient and they were also made aware of online access. Pt also made aware that some labs, such as cultures, will not be resulted during ER visit and follow up with PMD is necessary.      MDM  Number of Diagnoses or Management Options     Amount and/or Complexity of Data Reviewed  Clinical lab tests: ordered and reviewed (HGB 10, proBNP 3,283)  Tests in the radiology section of CPT®: ordered and reviewed (Chest XR see final read)  Tests in the medicine section of CPT®: ordered and reviewed (See procedure notes)  Decide to obtain previous medical records or to obtain history from someone other than the patient: yes (Epic)  Review and summarize past medical records: yes (Pt has a hx of CHF and pulmonary hypertension.)  Discuss the patient with other providers: yes ( ([facility]))  Independent visualization of images, tracings, or specimens: yes           DIAGNOSIS  Final diagnoses:   Acute on chronic congestive heart failure, unspecified heart failure type (CMS/HCC)   Pulmonary hypertension (CMS/HCC)       DISPOSITION  ADMISSION    Discussed treatment plan and reason for admission with pt/family and admitting physician.  Pt/family voiced understanding of the plan for admission for further testing/treatment as needed.         Latest Documented Vital Signs:  As of 10:32 PM  BP- 162/81 HR- 74 Temp- 97.3 °F (36.3 °C) (Oral) O2 sat- 96%    --  Documentation assistance provided by balaji Edmond for .  Information recorded by the scrradhika was done at my direction and has been verified and validated by me.         Tess Edmodn  09/11/19 6625       Christos Vegas MD  09/11/19 0140

## 2019-09-11 NOTE — PROGRESS NOTES
Clinical Pharmacy Services: Medication History    Jazmine Brown is a 89 y.o. female presenting to [facility] for   Chief Complaint   Patient presents with   • Shortness of Breath       She  has a past medical history of A-fib (CMS/[facility]), CHF (congestive heart failure) (CMS/[facility]), Depression, Hypertension, Polyp, sigmoid colon, Pulmonary hypertension (CMS/HCC), and Thyroid cancer (CMS/[facility]).    Allergies as of 09/11/2019 - Reviewed 09/11/2019   Allergen Reaction Noted   • Codeine Hallucinations 03/01/2017   • Latex Rash 10/14/2018       Medication information was obtained from: Patient, family, medication bottles  Pharmacy and Phone Number: [facility] [phone]    Prior to Admission Medications     Prescriptions Last Dose Informant Patient Reported? Taking?    acetaminophen (TYLENOL) 500 MG tablet  Self Yes Yes    Take 1,000 mg by mouth 2 (Two) Times a Day As Needed for Mild Pain .    albuterol (ACCUNEB) 1.25 MG/3ML nebulizer solution  Family Member No Yes    Take 3 mL by nebulization Every 4 (Four) Hours As Needed for Wheezing.    albuterol (PROAIR HFA) 108 (90 Base) MCG/ACT inhaler  Medication Bottle No Yes    Inhale 2 puffs Every 4 (Four) Hours As Needed for Wheezing or Shortness of Air.    carvedilol (COREG) 6.25 MG tablet 9/11/2019 Medication Bottle Yes Yes    Take 6.25 mg by mouth 2 (Two) Times a Day With Meals.    Cyanocobalamin (VITAMIN B-12) 3000 MCG sublingual tablet 9/11/2019 Medication Bottle Yes Yes    Place 1 tablet under the tongue Daily.    levothyroxine (SYNTHROID, LEVOTHROID) 75 MCG tablet 9/11/2019 Medication Bottle Yes Yes    Take 75 mcg by mouth Daily.    lisinopril (PRINIVIL,ZESTRIL) 5 MG tablet 9/11/2019 Medication Bottle No Yes    Take 1 tablet by mouth Daily.    multivitamins-minerals (PRESERVISION AREDS 2) capsule capsule 9/11/2019 Other Yes Yes    Take 1 capsule by mouth 2 (Two) Times a Day.    PARoxetine (PAXIL) 20 MG tablet 9/11/2019 Medication Bottle No Yes    Take 1 tablet  by mouth Daily.    umeclidinium-vilanterol (ANORO ELLIPTA) 62.5-25 MCG/INH aerosol powder  inhaler 9/11/2019 Other No Yes    Inhale 1 puff Daily.    vitamin D (ERGOCALCIFEROL) 48102 units capsule capsule 9/11/2019 Other No Yes    Take 1 capsule by mouth 1 (One) Time Per Week.    Patient taking differently:  Take 50,000 Units by mouth 1 (One) Time Per Week. Wednesday            Medication notes: Removed per family, therapy complete: Bumex, Cortisporin, Zofran, Triamcinolone    Preservision and Tylenol added    This medication list is complete to the best of my knowledge as of 9/11/2019    Please call if questions.    Rosmery Jaffe, Medication History Technician  9/11/2019 5:51 PM

## 2019-09-11 NOTE — H&P
"    Patient Name:  Jazmine Brown  YOB: 1926  MRN:  [id number]  Admit Date:  9/11/2019  Patient Care Team:  Gerard Jama MD as PCP - General (Internal Medicine)  Gerard Jama MD as PCP - Claims Attributed      Subjective   History Present Illness     Chief Complaint   Patient presents with   • Shortness of Breath       Ms. Brown is a 89 y.o. non-smoker with a history of atrial fibrillation, CHF, HTN and pulmonary hypertension that presents to [facility] complaining of shortness of breath.  Symptoms have been present for approximately 2 weeks.  She has a history of severe pulmonary hypertension but does not have a pulmonologist.  She states relatively speaking she is always short of breath but has been more so the last 2 weeks.  Came into the ER today due to acute worsening of her dyspnea.  She denies any weight gain or swelling in her lower extremities.  She actually complains of weight loss and reduction in her breast size.  She has been taking her medications as prescribed including her Bumex.  She received Lasix in the ED with moderate improvement of her symptoms.  She also feels she improved after a breathing treatment.  She was recently taken off of Coumadin due to falling and according to her family \"hallucinations.\"     Shortness of Breath   This is a new problem. The current episode started 1 to 4 weeks ago (2 weeks). The problem occurs constantly. The problem has been gradually worsening. Associated symptoms include orthopnea. Pertinent negatives include no abdominal pain, chest pain, fever or leg swelling. The symptoms are aggravated by any activity and lying flat. She has tried beta agonist inhalers for the symptoms. The treatment provided no relief. Her past medical history is significant for chronic lung disease and a heart failure.     Review of Systems   Constitutional: Negative.  Negative for fever.   HENT: Negative.    Respiratory: Positive for cough and " shortness of breath.    Cardiovascular: Positive for orthopnea. Negative for chest pain and leg swelling.   Gastrointestinal: Positive for diarrhea. Negative for abdominal pain.   Endocrine: Negative.    Genitourinary: Negative.    Musculoskeletal: Positive for gait problem (Fall 3 days ago).   Skin: Negative.    Hematological: Negative.    Psychiatric/Behavioral: Negative.         Personal History     Past Medical History:   Diagnosis Date   • A-fib (CMS/HCC)    • CHF (congestive heart failure) (CMS/HCC)    • Depression    • Hypertension    • Polyp, sigmoid colon    • Pulmonary hypertension (CMS/HCC)    • Thyroid cancer (CMS/HCC)      Past Surgical History:   Procedure Laterality Date   • CARDIAC PACEMAKER PLACEMENT     • HYSTERECTOMY     • PACEMAKER IMPLANTATION     • TONSILLECTOMY AND ADENOIDECTOMY     • TOTAL THYROIDECTOMY       Family History   Problem Relation Age of Onset   • Heart disease Mother    • Hypertension Father         benign essential hypertension     Social History     Tobacco Use   • Smoking status: Never Smoker   • Smokeless tobacco: Never Used   Substance Use Topics   • Alcohol use: No   • Drug use: No     No current facility-administered medications on file prior to encounter.      Current Outpatient Medications on File Prior to Encounter   Medication Sig Dispense Refill   • acetaminophen (TYLENOL) 500 MG tablet Take 1,000 mg by mouth 2 (Two) Times a Day As Needed for Mild Pain .     • albuterol (ACCUNEB) 1.25 MG/3ML nebulizer solution Take 3 mL by nebulization Every 4 (Four) Hours As Needed for Wheezing. 90 vial 2   • albuterol (PROAIR HFA) 108 (90 Base) MCG/ACT inhaler Inhale 2 puffs Every 4 (Four) Hours As Needed for Wheezing or Shortness of Air. 18 g 5   • carvedilol (COREG) 6.25 MG tablet Take 6.25 mg by mouth 2 (Two) Times a Day With Meals.     • Cyanocobalamin (VITAMIN B-12) 3000 MCG sublingual tablet Place 1 tablet under the tongue Daily.     • levothyroxine (SYNTHROID, LEVOTHROID) 75  MCG tablet Take 75 mcg by mouth Daily.     • lisinopril (PRINIVIL,ZESTRIL) 5 MG tablet Take 1 tablet by mouth Daily. 90 tablet 1   • multivitamins-minerals (PRESERVISION AREDS 2) capsule capsule Take 1 capsule by mouth 2 (Two) Times a Day.     • PARoxetine (PAXIL) 20 MG tablet Take 1 tablet by mouth Daily. 90 tablet 1   • umeclidinium-vilanterol (ANORO ELLIPTA) 62.5-25 MCG/INH aerosol powder  inhaler Inhale 1 puff Daily. 1 each 10   • vitamin D (ERGOCALCIFEROL) 74756 units capsule capsule Take 1 capsule by mouth 1 (One) Time Per Week. (Patient taking differently: Take 50,000 Units by mouth 1 (One) Time Per Week. Wednesday) 12 capsule 0   • [DISCONTINUED] bumetanide (BUMEX) 0.5 MG tablet Take 2 tablets by mouth Daily. 180 tablet 1   • [DISCONTINUED] carvedilol (COREG) 6.25 MG tablet TAKE 1 TABLET BY MOUTH TWICE DAILY WITH MEALS 180 tablet 1   • [DISCONTINUED] levothyroxine (SYNTHROID) 100 MCG tablet Take 1 tablet by mouth Daily. 30 tablet 1   • [DISCONTINUED] neomycin-polymyxin-hydrocortisone (CORTISPORIN) 3.5-33091-6 otic solution ADMINSTER 2 DROPS INTO BOTH EARS 3 TIMES DAILY 10 mL 0   • [DISCONTINUED] ondansetron ODT (ZOFRAN-ODT) 4 MG disintegrating tablet Take 1 tablet by mouth 4 (Four) Times a Day As Needed for Nausea or Vomiting. 15 tablet 0   • [DISCONTINUED] triamcinolone (KENALOG) 0.1 % ointment Apply  topically 2 (Two) Times a Day As Needed for Irritation. 30 g 0   • [DISCONTINUED] vitamin B-12 (CYANOCOBALAMIN) 100 MCG tablet Take 50 mcg by mouth Daily.       Allergies   Allergen Reactions   • Codeine Hallucinations   • Latex Rash       Objective    Objective     Vital Signs  Temp:  [96.4 °F (35.8 °C)-96.5 °F (35.8 °C)] 96.5 °F (35.8 °C)  Heart Rate:  [74-84] 74  Resp:  [16-20] 16  BP: (136-160)/(69-84) 149/69  SpO2:  [92 %-100 %] 96 %  on  Flow (L/min):  [2] 2;   Device (Oxygen Therapy): nasal cannula  Body mass index is 25.51 kg/m².    Physical Exam   Constitutional: She is oriented to person, place, and  time. She appears well-developed and well-nourished. No distress.   HENT:   Head: Normocephalic and atraumatic.   Eyes: Conjunctivae and EOM are normal. No scleral icterus.   Neck: Normal range of motion. No JVD present.   Cardiovascular: Normal rate and regular rhythm.   paced   Pulmonary/Chest: Effort normal. She has rales in the right lower field.   Abdominal: Soft. Bowel sounds are normal. She exhibits no distension. There is no tenderness.   Musculoskeletal: Normal range of motion. She exhibits no edema.   Neurological: She is alert and oriented to person, place, and time.   Skin: Skin is warm and dry.   Psychiatric: She has a normal mood and affect. Her behavior is normal.   Nursing note and vitals reviewed.      Results Review:  I reviewed the patient's new clinical results.  I reviewed the patient's new imaging results and agree with the interpretation.  I reviewed the patient's other test results and agree with the interpretation  I personally viewed and interpreted the patient's EKG/Telemetry data  Discussed with ED provider.    Lab Results (last 24 hours)     Procedure Component Value Units Date/Time    CBC & Differential [[id number]] Collected:  09/11/19 1257    Specimen:  Blood Updated:  09/11/19 1405    Narrative:       The following orders were created for panel order CBC & Differential.  Procedure                               Abnormality         Status                     ---------                               -----------         ------                     CBC Auto Differential[[id number]]        Abnormal            Final result                 Please view results for these tests on the individual orders.    Comprehensive Metabolic Panel [[id number]]  (Abnormal) Collected:  09/11/19 1257    Specimen:  Blood Updated:  09/11/19 1333     Glucose 87 mg/dL      BUN 13 mg/dL      Creatinine 0.83 mg/dL      Sodium 141 mmol/L      Potassium 4.9 mmol/L      Chloride 101 mmol/L      CO2 32.0 mmol/L      Calcium  8.9 mg/dL      Total Protein 6.7 g/dL      Albumin 3.90 g/dL      ALT (SGPT) 7 U/L      AST (SGOT) 24 U/L      Alkaline Phosphatase 81 U/L      Total Bilirubin 0.4 mg/dL      eGFR Non African Amer 64 mL/min/1.73      Globulin 2.8 gm/dL      A/G Ratio 1.4 g/dL      BUN/Creatinine Ratio 15.7     Anion Gap 8.0 mmol/L     Narrative:       GFR Normal >60  Chronic Kidney Disease <60  Kidney Failure <15    BNP [[id number]]  (Abnormal) Collected:  09/11/19 1257    Specimen:  Blood Updated:  09/11/19 1329     proBNP 3,238.0 pg/mL     Narrative:       Among patients with dyspnea, NT-proBNP is highly sensitive for the detection of acute congestive heart failure. In addition NT-proBNP of <300 pg/ml effectively rules out acute congestive heart failure with 99% negative predictive value.    Troponin [[id number]]  (Normal) Collected:  09/11/19 1257    Specimen:  Blood Updated:  09/11/19 1333     Troponin T <0.010 ng/mL     Narrative:       Troponin T Reference Range:  <= 0.03 ng/mL-   Negative for AMI  >0.03 ng/mL-     Abnormal for myocardial necrosis.  Clinicians would have to utilize clinical acumen, EKG, Troponin and serial changes to determine if it is an Acute Myocardial Infarction or myocardial injury due to an underlying chronic condition.     CBC Auto Differential [[id number]]  (Abnormal) Collected:  09/11/19 1257    Specimen:  Blood Updated:  09/11/19 1405     WBC 6.74 10*3/mm3      RBC 2.70 10*6/mm3      Hemoglobin 10.0 g/dL      Hematocrit 33.7 %      .8 fL      MCH 37.0 pg      MCHC 29.7 g/dL      RDW 19.7 %      RDW-SD 91.6 fl      MPV 11.4 fL      Platelets 251 10*3/mm3      nRBC 0.0 /100 WBC     Manual Differential [[id number]]  (Abnormal) Collected:  09/11/19 1257    Specimen:  Blood Updated:  09/11/19 1405     Neutrophil % 93.0 %      Lymphocyte % 5.0 %      Monocyte % 2.0 %      Neutrophils Absolute 6.27 10*3/mm3      Lymphocytes Absolute 0.34 10*3/mm3      Monocytes Absolute 0.13 10*3/mm3      Anisocytosis  Slight/1+     Macrocytes Mod/2+     Spherocytes Slight/1+     WBC Morphology Normal     Platelet Morphology Normal          Imaging Results (last 24 hours)     Procedure Component Value Units Date/Time    XR Chest 2 View [[id number]] Collected:  09/11/19 1315     Updated:  09/11/19 1322    Narrative:       XR CHEST 2 VW-     HISTORY: Female who is 89 years-old,  short of breath     TECHNIQUE: Frontal and lateral views of the chest     COMPARISON: 08/21/2019     FINDINGS: Heart is enlarged. Pulmonary vasculature is unremarkable.  Left-sided generator device, cardiac lead. Aorta is tortuous, calcified.  Mild likely atelectasis in the right lower lung. No pleural effusion, or  pneumothorax. Right hemidiaphragm remains elevated. No acute osseous  process.       Impression:       Mild likely atelectasis in the right lower lung.  Cardiomegaly. Tortuous aorta. Follow-up as clinically indicated.     This report was finalized on 9/11/2019 1:19 PM by Dr. Jaret Watson M.D.             ECG 12 Lead   Preliminary Result   HEART RATE= 75  bpm   RR Interval= 800  ms   GA Interval=   ms   P Horizontal Axis=   deg   P Front Axis=   deg   QRSD Interval= 167  ms   QT Interval= 444  ms   QRS Axis= 128  deg   T Wave Axis= -53  deg   - ABNORMAL ECG -   Afib/flutter and ventricular-paced rhythm   Electronically Signed By:    Date and Time of Study: 2019-09-11 12:48:40           Assessment/Plan     Active Hospital Problems    Diagnosis POA   • **Heart failure, acute on chronic, systolic and diastolic (CMS/HCC) [I50.43] Yes   • Chronic respiratory failure with hypoxia (CMS/HCC) [J96.11] Yes   • Essential hypertension [I10] Yes   • Chronic fatigue [R53.82] Yes   • Moderate to severe pulmonary hypertension (CMS/HCC) [I27.20] Yes   • Atrial fibrillation (CMS/HCC) [I48.91] Yes   • Cardiomyopathy (CMS/HCC) [I42.9] Yes   • Cardiac pacemaker in situ [Z95.0] Yes       89 y.o. female admitted with Heart failure, acute on chronic, systolic and  diastolic (CMS/HCC).  She was taken off Bumex a few weeks ago by her PCP and told to take only as needed.    · IV Lasix received in the ED with significant improvement in symptoms.  She seems euvolemic to me currently.  Will restart her PO Bumex in a.m.  Consider repeat IV Lasix/Bumex after reevaluated tomorrow.  · Monitor renal function.  · Continue scheduled nebulizers but hold on further steroids.  No wheezing during my exam.  · Daily weights, strict I/O's etc.  · Lovenox 40 mg SC daily for DVT prophylaxis.  · Limited code (no CPR, no intubation).  · Discussed with patient, family and ED provider.      Pedro Hernadez MD  [facility]  09/11/19  7:53 PM

## 2019-09-12 NOTE — PROGRESS NOTES
Discharge Planning Assessment  [facility]     Patient Name: Jazmine Brown  MRN: [id number]  Today's Date: 9/12/2019    Admit Date: 9/11/2019    Discharge Needs Assessment     Row Name 09/12/19 1547       Living Environment    Lives With  alone    Current Living Arrangements  home/apartment/condo    Potentially Unsafe Housing Conditions  other (see comments) no concerns     Primary Care Provided by  self    Provides Primary Care For  no one    Family Caregiver if Needed  child(clari), adult;other (see comments)    Family Caregiver Names  Caregivers     Quality of Family Relationships  helpful;involved;supportive    Able to Return to Prior Arrangements  yes       Resource/Environmental Concerns    Resource/Environmental Concerns  none    Transportation Concerns  car, none       Transition Planning    Patient/Family Anticipates Transition to  home    Patient/Family Anticipated Services at Transition  none    Transportation Anticipated  family or friend will provide       Discharge Needs Assessment    Readmission Within the Last 30 Days  no previous admission in last 30 days    Concerns to be Addressed  no discharge needs identified;denies needs/concerns at this time    Equipment Currently Used at Home  grab bar;shower chair    Anticipated Changes Related to Illness  none    Equipment Needed After Discharge  none    Outpatient/Agency/Support Group Needs  homecare agency    Offered/Gave Vendor List  yes        Discharge Plan     Row Name 09/12/19 1548       Plan    Plan  Home with Caregivers     Patient/Family in Agreement with Plan  yes    Plan Comments  CCP met with patient at bedside. CCP role explained and discharge planning discussed. Face sheet verified and IMM noted. Patient's PCP is Dr. Lindquist. Patient lives alone, with one step to the entrance of her home. Patient has grab bars and shower chair. Patient uses a rollator for mobility. Patient states her sons assist her with getting her groceries and  medications. Patient has home 02 and nebulizer but was unsure of which company provided it. Patient has not used home health but has been to [place] for SNF. Patient states she does have caregivers that come into the home 4 days a week from 12 P.M-7 P.M and assist her with cooking, cleaning and bathing. Patient's preferred pharmacy is [facility] in [place]; patient denies having trouble obtaining her medications. Patient does not anticipate needing HH/SNF. CCP will follow for Lovenox and assist as needed. Zaida Avilez CSW         Destination      No service coordination in this encounter.      Durable Medical Equipment      No service coordination in this encounter.      Dialysis/Infusion      No service coordination in this encounter.      Home Medical Care      No service coordination in this encounter.      Therapy      No service coordination in this encounter.      Community Resources      No service coordination in this encounter.          Demographic Summary     Row Name 09/12/19 1547       General Information    Admission Type  inpatient    Arrived From  emergency department    Required Notices Provided  Important Message from Medicare    Referral Source  admission list    Reason for Consult  discharge planning    Preferred Language  English     Used During This Interaction  no        Functional Status     Row Name 09/12/19 1547       Functional Status    Usual Activity Tolerance  good    Current Activity Tolerance  good       Functional Status, IADL    Medications  assistive equipment    Meal Preparation  assistive equipment    Housekeeping  assistive equipment    Laundry  assistive equipment    Shopping  assistive equipment       Mental Status    General Appearance WDL  WDL       Mental Status Summary    Recent Changes in Mental Status/Cognitive Functioning  no changes        Psychosocial    No documentation.       Abuse/Neglect    No documentation.       Legal    No documentation.        Substance Abuse    No documentation.       Patient Forms    No documentation.           JENNIFER Contreras

## 2019-09-12 NOTE — THERAPY EVALUATION
Patient Name: Jazmine Brown  : 1926    MRN: [id number]                              Today's Date: 2019       Admit Date: 2019    Visit Dx:     ICD-10-CM ICD-9-CM   1. Acute on chronic congestive heart failure, unspecified heart failure type (CMS/HCC) I50.9 428.0   2. Pulmonary hypertension (CMS/HCC) I27.20 416.8     Patient Active Problem List   Diagnosis   • Fatigue   • Hypothyroidism   • Atrial fibrillation (CMS/HCC)   • Acute cystitis without hematuria   • Shortness of breath   • Heart failure, acute on chronic, systolic and diastolic (CMS/HCC)   • Acute contact otitis externa of left ear   • Essential hypertension   • Chronic fatigue   • Cardiac pacemaker in situ   • Cardiomyopathy (CMS/HCC)   • Moderate to severe pulmonary hypertension (CMS/HCC)   • Hematoma   • Major depressive disorder   • Fall at home   • Anemia   • Chronic respiratory failure with hypoxia (CMS/HCC)     Past Medical History:   Diagnosis Date   • A-fib (CMS/HCC)    • CHF (congestive heart failure) (CMS/HCC)    • Depression    • Hypertension    • Polyp, sigmoid colon    • Pulmonary hypertension (CMS/HCC)    • Thyroid cancer (CMS/HCC)      Past Surgical History:   Procedure Laterality Date   • CARDIAC PACEMAKER PLACEMENT     • HYSTERECTOMY     • PACEMAKER IMPLANTATION     • TONSILLECTOMY AND ADENOIDECTOMY     • TOTAL THYROIDECTOMY       General Information     Row Name 19 9189          PT Evaluation Time/Intention    Document Type  evaluation Pt. admitted with Acute on Chronic CHF; SOA.  -MS     Mode of Treatment  physical therapy;individual therapy  -MS     Row Name 19 3509          General Information    Patient Profile Reviewed?  yes  -MS     Prior Level of Function  independent:  -MS     Existing Precautions/Restrictions  fall;oxygen therapy device and L/min  (Significant)   -MS     Row Name 19 0749          Cognitive Assessment/Intervention- PT/OT    Orientation Status (Cognition)  oriented x 3  -MS      Row Name 09/12/19 1135          Safety Issues, Functional Mobility    Comment, Safety Issues/Impairments (Mobility)  Gait belt used for safety  -MS       User Key  (r) = Recorded By, (t) = Taken By, (c) = Cosigned By    Initials Name Provider Type    MS Aguilar, Radames TURCIOS, PT Physical Therapist        Mobility     Row Name 09/12/19 1136          Bed Mobility Assessment/Treatment    Bed Mobility Assessment/Treatment  supine-sit;sit-supine  -MS     Supine-Sit [place] (Bed Mobility)  contact guard  -MS     Sit-Supine [place] (Bed Mobility)  contact guard  -MS     Assistive Device (Bed Mobility)  bed rails  -MS     Row Name 09/12/19 1136          Sit-Stand Transfer    Sit-Stand [place] (Transfers)  contact guard  -MS     Assistive Device (Sit-Stand Transfers)  walker, front-wheeled  -MS     Row Name 09/12/19 1136          Gait/Stairs Assessment/Training    [place] Level (Gait)  contact guard  -MS     Assistive Device (Gait)  walker, front-wheeled  -MS     Distance in Feet (Gait)  100 feet  -MS     Pattern (Gait)  step-through  -MS     Bilateral Gait Deviations  forward flexed posture  -MS     Comment (Gait/Stairs)  Verbal/tactile cues for posture correction and Rwx guidance. x 1 standing rest break due to fatigue and SOA.   -MS       User Key  (r) = Recorded By, (t) = Taken By, (c) = Cosigned By    Initials Name Provider Type    MS Aguilar, Radames TURCIOS, PT Physical Therapist        Obj/Interventions     Row Name 09/12/19 1137          General ROM    GENERAL ROM COMMENTS  BUE/LE (WFL's)  -MS     Row Name 09/12/19 1137          MMT (Manual Muscle Testing)    General MMT Comments  BUE/LE (3+/5)  -MS       User Key  (r) = Recorded By, (t) = Taken By, (c) = Cosigned By    Initials Name Provider Type    Radames Bolanos, PT Physical Therapist        Goals/Plan     Row Name 09/12/19 1138          Bed Mobility Goal 1 (PT)    Activity/Assistive Device (Bed Mobility Goal 1, PT)  bed mobility activities, all   -MS     [place] Level/Cues Needed (Bed Mobility Goal 1, PT)  independent  -MS     Time Frame (Bed Mobility Goal 1, PT)  long term goal (LTG);1 week  -MS     Row Name 09/12/19 1138          Transfer Goal 1 (PT)    Activity/Assistive Device (Transfer Goal 1, PT)  transfers, all;walker, rolling  -MS     [place] Level/Cues Needed (Transfer Goal 1, PT)  independent  -MS     Time Frame (Transfer Goal 1, PT)  long term goal (LTG);5 days  -MS     Row Name 09/12/19 1138          Gait Training Goal 1 (PT)    Activity/Assistive Device (Gait Training Goal 1, PT)  gait (walking locomotion);walker, rolling  -MS     [place] Level (Gait Training Goal 1, PT)  independent  -MS     Distance (Gait Goal 1, PT)  200 feet  -MS     Time Frame (Gait Training Goal 1, PT)  long term goal (LTG);1 week  -MS       User Key  (r) = Recorded By, (t) = Taken By, (c) = Cosigned By    Initials Name Provider Type    Radames Bolanos, PT Physical Therapist        Clinical Impression     Row Name 09/12/19 1138          Pain Assessment    Additional Documentation  Pain Scale: Numbers Pre/Post-Treatment (Group)  -MS     [facility] Name 09/12/19 1138          Pain Scale: Numbers Pre/Post-Treatment    Pain Scale: Numbers, Pretreatment  0/10 - no pain  -MS     Pain Scale: Numbers, Post-Treatment  0/10 - no pain  -MS     Row Name 09/12/19 1138          Plan of Care Review    Plan of Care Reviewed With  patient  -MS     [facility] Name 09/12/19 1138          Physical Therapy Clinical Impression    Criteria for Skilled Interventions Met (PT Clinical Impression)  yes  -MS     Rehab Potential (PT Clinical Summary)  good, to achieve stated therapy goals  -MS     Row Name 09/12/19 1138          Positioning and Restraints    Pre-Treatment Position  in bed  -MS     Post Treatment Position  bed  -MS     In Bed  notified nsg;supine;call light within reach;encouraged to call for assist All lines intact.  -MS       User Key  (r) = Recorded By, (t) = Taken By, (c) =  Cosigned By    Initials Name Provider Type    MS AguilarRadames, PT Physical Therapist        Outcome Measures     Row Name 09/12/19 1140          How much help from another person do you currently need...    Turning from your back to your side while in flat bed without using bedrails?  3  -MS     Moving from lying on back to sitting on the side of a flat bed without bedrails?  3  -MS     Moving to and from a bed to a chair (including a wheelchair)?  3  -MS     Standing up from a chair using your arms (e.g., wheelchair, bedside chair)?  3  -MS     Climbing 3-5 steps with a railing?  2  -MS     To walk in hospital room?  3  -MS     AM-PAC 6 Clicks Score (PT)  17  -MS     Row Name 09/12/19 1140          Functional Assessment    Outcome Measure Options  AM-PAC 6 Clicks Basic Mobility (PT)  -MS       User Key  (r) = Recorded By, (t) = Taken By, (c) = Cosigned By    Initials Name Provider Type    MS AguilarRadames, PT Physical Therapist        Physical Therapy Education     Title: PT OT SLP Therapies (Done)     Topic: Physical Therapy (Done)     Point: Mobility training (Done)     Learning Progress Summary           Patient Acceptance, E,D, VU,NR by MS at 9/12/2019 11:39 AM                   Point: Home exercise program (Done)     Learning Progress Summary           Patient Acceptance, E,D, VU,NR by MS at 9/12/2019 11:39 AM                   Point: Body mechanics (Done)     Learning Progress Summary           Patient Acceptance, E,D, VU,NR by MS at 9/12/2019 11:39 AM                   Point: Precautions (Done)     Learning Progress Summary           Patient Acceptance, E,D, VU,NR by MS at 9/12/2019 11:39 AM                               User Key     Initials Effective Dates Name Provider Type Discipline    MS 04/03/18 -  Radames Aguilar, PT Physical Therapist PT              PT Recommendation and Plan  Planned Therapy Interventions (PT Eval): balance training, bed mobility training, gait training, home exercise  program, patient/family education, postural re-education, strengthening, transfer training  Outcome Summary/Treatment Plan (PT)  Anticipated Discharge Disposition (PT): home with assist, home with home health  Plan of Care Reviewed With: patient  Outcome Summary: Pt. will benefit from skilled inpt. P.T. to address her functional deficits and to assist pt. in regaining her maximum level of independence with functional mobility.      Time Calculation:   PT Charges     Row Name 09/12/19 1140             Time Calculation    Start Time  1105  -MS      Stop Time  1120  -MS      Time Calculation (min)  15 min  -MS      PT Received On  09/12/19  -MS      PT - Next Appointment  09/13/19  -MS      PT Goal Re-Cert Due Date  09/19/19  -MS         Time Calculation- PT    Total Timed Code Minutes- PT  13 minute(s)  -MS        User Key  (r) = Recorded By, (t) = Taken By, (c) = Cosigned By    Initials Name Provider Type    Radames Bolanos, PT Physical Therapist        Therapy Charges for Today     Code Description Service Date Service Provider Modifiers Qty    77657979082 HC PT EVAL LOW COMPLEXITY 1 9/12/2019 Radames Aguilar, PT GP 1    63335862822 HC PT THER PROC EA 15 MIN 9/12/2019 Radames Aguilar, PT GP 1          PT G-Codes  Outcome Measure Options: AM-PAC 6 Clicks Basic Mobility (PT)  AM-PAC 6 Clicks Score (PT): 17    Radames Aguilar, ARANZA  9/12/2019

## 2019-09-12 NOTE — PLAN OF CARE
Problem: Patient Care Overview  Goal: Plan of Care Review  Outcome: Ongoing (interventions implemented as appropriate)   09/12/19 1623   Coping/Psychosocial   Plan of Care Reviewed With patient   Plan of Care Review   Progress no change   OTHER   Outcome Summary Pt has had no c/o of pain. Tolerating oral meds. BNP elevated this AM. IV lasix ordered q8hrs. Purewick in place. Assist of 1 w/ walker. VSS. Will continue to monitor.        Problem: Fall Risk (Adult)  Goal: Identify Related Risk Factors and Signs and Symptoms  Outcome: Ongoing (interventions implemented as appropriate)   09/12/19 1623   Fall Risk (Adult)   Related Risk Factors (Fall Risk) age-related changes;history of falls;gait/mobility problems;inadequate lighting;slippery/uneven surfaces;environment unfamiliar   Signs and Symptoms (Fall Risk) presence of risk factors     Goal: Absence of Fall  Outcome: Ongoing (interventions implemented as appropriate)   09/12/19 1623   Fall Risk (Adult)   Absence of Fall making progress toward outcome       Problem: Skin Injury Risk (Adult)  Goal: Identify Related Risk Factors and Signs and Symptoms  Outcome: Ongoing (interventions implemented as appropriate)   09/12/19 1623   Skin Injury Risk (Adult)   Related Risk Factors (Skin Injury Risk) critical care admission;advanced age;mobility impaired     Goal: Skin Health and Integrity  Outcome: Ongoing (interventions implemented as appropriate)   09/12/19 1623   Skin Injury Risk (Adult)   Skin Health and Integrity making progress toward outcome

## 2019-09-12 NOTE — PROGRESS NOTES
"     LOS: 1 day   Primary Care Physician: Gerard Jama MD     Subjective   Better today but not back to baseline.  She is been sick for a while with coughing.  She is on oxygen chronically.    Vital Signs  Body mass index is 20.94 kg/m².  Temp:  [97.3 °F (36.3 °C)-98.3 °F (36.8 °C)] 98.3 °F (36.8 °C)  Heart Rate:  [74-92] 77  Resp:  [16-24] 24  BP: (123-162)/(55-81) 123/55      Objective:  General Appearance:  In no acute distress (Looks younger than age).    Vital signs: (most recent): Blood pressure 123/55, pulse 77, temperature 98.3 °F (36.8 °C), temperature source Oral, resp. rate 24, height 154.9 cm (61\"), weight 50.3 kg (110 lb 12.8 oz), SpO2 100 %, not currently breastfeeding.    HEENT: (No JVD.  Neck supple.  Trachea midline)    Lungs:  She is not in respiratory distress.  No stridor.  There are rales and decreased breath sounds.  No wheezes or rhonchi.  (A few crackles right base)  Heart: Normal rate.  Regular rhythm.  Positive for murmur.    Abdomen: Abdomen is soft and non-distended.  Bowel sounds are normal.   There is no abdominal tenderness.   There is no splenomegaly. There is no hepatomegaly.   Extremities: There is no dependent edema.    Neurological: Patient is alert.    Skin:  Warm and dry.          Results Review:    I reviewed the patient's new clinical results.    Results from last 7 days   Lab Units 09/12/19  0648 09/11/19  1257   WBC 10*3/mm3 7.29 6.74   HEMOGLOBIN g/dL 9.8* 10.0*   PLATELETS 10*3/mm3 238 251     Results from last 7 days   Lab Units 09/12/19  0648 09/11/19  1257   SODIUM mmol/L 140 141   POTASSIUM mmol/L 4.4 4.9   CHLORIDE mmol/L 98 101   CO2 mmol/L 30.3* 32.0*   BUN mg/dL 22 13   CREATININE mg/dL 0.88 0.83   CALCIUM mg/dL 8.7 8.9   GLUCOSE mg/dL 116* 87         Hemoglobin A1C:  Lab Results   Component Value Date    HGBA1C 5.00 08/07/2019       Glucose Range:No results found for: POCGLU    Lab Results   Component Value Date    LUJVELJS86 765 08/21/2019       Lab " Results   Component Value Date    TSH 0.942 08/07/2019       Assessment & Plan      Medication Review: Yes    Active Hospital Problems    Diagnosis  POA   • **Heart failure, acute on chronic, systolic and diastolic (CMS/HCC) [I50.43]  Yes   • Chronic respiratory failure with hypoxia (CMS/HCC) [J96.11]  Yes   • Essential hypertension [I10]  Yes   • Chronic fatigue [R53.82]  Yes   • Moderate to severe pulmonary hypertension (CMS/HCC) [I27.20]  Yes   • Atrial fibrillation (CMS/HCC) [I48.91]  Yes   • Cardiomyopathy (CMS/HCC) [I42.9]  Yes   • Cardiac pacemaker in situ [Z95.0]  Yes      Resolved Hospital Problems   No resolved problems to display.       Assessment/Plan  1.  Acute on chronic biventricular failure.  Last echocardiogram in the [place] records with ejection fraction 40 to 45%.  Severe pulmonary hypertension with RV pressure 67.  Her cardiologist is Dr. Childs.  Findings consistent with cor pulmonale.  IV Lasix today.  Recheck labs tomorrow.  Tentatively plan on her home dose of Bumex tomorrow.  By the [place] records it's 1/2 mg daily.  Echocardiogram ordered.  She is on Coreg Bumex and lisinopril.  TSH last month okay  2.  Coughing and shortness of breath with chronic hypoxic respiratory failure, severe pulmonary hypertension and heart failure as noted above.  She is also on an ACE inhibitor.  Await results of echo.  Respiratory viral panel ordered  3.  A. fib/flutter, paced.  Unchanged from previous EKG 2018  4.  Weakness.  Continue physical therapy  5.  Anemia.  B12, folate and TSH okay last month.  Hemoglobin was almost 12 June 2019.  Recheck labs in a.m.  Some component of dilution    Adriana Gold MD  09/12/19  7:10 PM

## 2019-09-12 NOTE — PLAN OF CARE
Problem: Patient Care Overview  Goal: Plan of Care Review   09/12/19 1137   Coping/Psychosocial   Plan of Care Reviewed With patient   OTHER   Outcome Summary Pt. will benefit from skilled inpt. P.T. to address her functional deficits and to assist pt. in regaining her maximum level of independence with functional mobility.

## 2019-09-12 NOTE — PROGRESS NOTES
".  Pharmacy Consult - Lovenox    Jazmine Brown has been consulted for pharmacy to dose enoxaparin for VTE prophylaxis per Dr. Hernadez's request.       Relevant clinical data and objective history reviewed:  89 y.o. female 154.9 cm (61\") 46.8 kg (103 lb 2.8 oz)    Home Anticoagulation: None    Past Medical History:   Diagnosis Date   • A-fib (CMS/HCC)    • CHF (congestive heart failure) (CMS/HCC)    • Depression    • Hypertension    • Polyp, sigmoid colon    • Pulmonary hypertension (CMS/HCC)    • Thyroid cancer (CMS/HCC)      is allergic to codeine and latex.    Lab Results   Component Value Date    WBC 6.74 09/11/2019    HGB 10.0 (L) 09/11/2019    HCT 33.7 (L) 09/11/2019    .8 (H) 09/11/2019     09/11/2019     Lab Results   Component Value Date    GLUCOSE 87 09/11/2019    CALCIUM 8.9 09/11/2019     09/11/2019    K 4.9 09/11/2019    CO2 32.0 (H) 09/11/2019     09/11/2019    BUN 13 09/11/2019    CREATININE 0.83 09/11/2019    EGFRIFAFRI 65 08/07/2019    EGFRIFNONA 64 09/11/2019    BCR 15.7 09/11/2019    ANIONGAP 8.0 09/11/2019       Estimated Creatinine Clearance: 31.3 mL/min (by C-G formula based on SCr of 0.83 mg/dL).    Active Inpatient Anticoagulation Orders:     Assessment/Plan    Will start patient on Lovenox 40 mg subcutaneous every 24 hours, adjusted for renal function. Pharmacy will continue to follow.     Castro Trejo, [facility]    "

## 2019-09-12 NOTE — PLAN OF CARE
Problem: Patient Care Overview  Goal: Plan of Care Review  Outcome: Ongoing (interventions implemented as appropriate)   09/12/19 6012   Coping/Psychosocial   Plan of Care Reviewed With patient   Plan of Care Review   Progress improving   OTHER   Outcome Summary pt admitted tonight w c SOB and diagnosis of acute on ch HF. Lasix given in ER prior to arrival to unit and pt has felt better since. Diuresed well, incontinent episode and up to bathroom x1 w staff. VSS. on 1L O2 per NC per request saying its baseline. AXOX4. will cont to monitor,      Goal: Individualization and Mutuality  Outcome: Ongoing (interventions implemented as appropriate)    Goal: Discharge Needs Assessment  Outcome: Ongoing (interventions implemented as appropriate)    Goal: Interprofessional Rounds/Family Conf  Outcome: Ongoing (interventions implemented as appropriate)      Problem: Fall Risk (Adult)  Goal: Identify Related Risk Factors and Signs and Symptoms  Outcome: Ongoing (interventions implemented as appropriate)    Goal: Absence of Fall  Outcome: Ongoing (interventions implemented as appropriate)

## 2019-09-13 NOTE — PLAN OF CARE
Problem: Patient Care Overview  Goal: Plan of Care Review  Outcome: Ongoing (interventions implemented as appropriate)   09/13/19 8155   Coping/Psychosocial   Plan of Care Reviewed With patient   Plan of Care Review   Progress improving   OTHER   Outcome Summary VSS. Called Dr. Gold to order benadryl for pt to sleep. Pt has rested well most of night. Pt receiving IV lasix Q8H and pt on purewick due to frequency. Respiratory panel negative. Echo ordered for AM bumex to be restarted in AM. Pt turns self in bed. No c/o pain or anything else. Will continue to monitor patient.     Goal: Individualization and Mutuality  Outcome: Ongoing (interventions implemented as appropriate)      Problem: Fall Risk (Adult)  Goal: Identify Related Risk Factors and Signs and Symptoms  Outcome: Outcome(s) achieved Date Met: 09/13/19    Goal: Absence of Fall  Outcome: Ongoing (interventions implemented as appropriate)      Problem: Skin Injury Risk (Adult)  Goal: Identify Related Risk Factors and Signs and Symptoms  Outcome: Outcome(s) achieved Date Met: 09/13/19    Goal: Skin Health and Integrity  Outcome: Ongoing (interventions implemented as appropriate)      Problem: Cardiac: Heart Failure (Adult)  Goal: Signs and Symptoms of Listed Potential Problems Will be Absent, Minimized or Managed (Cardiac: Heart Failure)  Outcome: Ongoing (interventions implemented as appropriate)

## 2019-09-13 NOTE — PLAN OF CARE
Problem: Patient Care Overview  Goal: Plan of Care Review  Outcome: Ongoing (interventions implemented as appropriate)   09/13/19 8487   Coping/Psychosocial   Plan of Care Reviewed With patient   Plan of Care Review   Progress improving   OTHER   Outcome Summary Pt is able to get out of bed and ambulate with contact guard assist, but she displays unsteadiness, SOA and fatigue during gait. Pt would likely benefit from continue PT to improve strength, mobility, endurance, and safety.

## 2019-09-13 NOTE — THERAPY TREATMENT NOTE
Patient Name: Jazmine Brown  : 1926    MRN: [id number]                              Today's Date: 2019       Admit Date: 2019    Visit Dx:     ICD-10-CM ICD-9-CM   1. Acute on chronic congestive heart failure, unspecified heart failure type (CMS/HCC) I50.9 428.0   2. Pulmonary hypertension (CMS/HCC) I27.20 416.8     Patient Active Problem List   Diagnosis   • Fatigue   • Hypothyroidism   • Atrial fibrillation (CMS/HCC)   • Acute cystitis without hematuria   • Shortness of breath   • Heart failure, acute on chronic, systolic and diastolic (CMS/HCC)   • Acute contact otitis externa of left ear   • Essential hypertension   • Chronic fatigue   • Cardiac pacemaker in situ   • Cardiomyopathy (CMS/HCC)   • Moderate to severe pulmonary hypertension (CMS/HCC)   • Hematoma   • Major depressive disorder   • Fall at home   • Anemia   • Chronic respiratory failure with hypoxia (CMS/HCC)     Past Medical History:   Diagnosis Date   • A-fib (CMS/HCC)    • CHF (congestive heart failure) (CMS/HCC)    • Depression    • Hypertension    • Polyp, sigmoid colon    • Pulmonary hypertension (CMS/HCC)    • Thyroid cancer (CMS/HCC)      Past Surgical History:   Procedure Laterality Date   • CARDIAC PACEMAKER PLACEMENT     • HYSTERECTOMY     • PACEMAKER IMPLANTATION     • TONSILLECTOMY AND ADENOIDECTOMY     • TOTAL THYROIDECTOMY       General Information     Row Name 19 1100          PT Evaluation Time/Intention    Document Type  therapy note (daily note)  -PH     Mode of Treatment  physical therapy  -PH     Row Name 19 1100          Cognitive Assessment/Intervention- PT/OT    Orientation Status (Cognition)  oriented x 3  -PH     Row Name 19 1100          Safety Issues, Functional Mobility    Impairments Affecting Function (Mobility)  balance;coordination;shortness of breath  -PH       User Key  (r) = Recorded By, (t) = Taken By, (c) = Cosigned By    Initials Name Provider Type    PH Cathryn,  OWEN Nunes Physical Therapy Assistant        Mobility     Row Name 09/13/19 1102          Bed Mobility Assessment/Treatment    Bed Mobility Assessment/Treatment  supine-sit;sit-supine  -PH     Supine-Sit [place] (Bed Mobility)  supervision  -PH     Sit-Supine [place] (Bed Mobility)  contact guard  -PH     Assistive Device (Bed Mobility)  head of bed elevated  -PH     Comment (Bed Mobility)  needs additional time to complete tasks  -PH     Row Name 09/13/19 1102          Sit-Stand Transfer    Sit-Stand [place] (Transfers)  contact guard  -PH     Assistive Device (Sit-Stand Transfers)  walker, front-wheeled  -PH     Row Name 09/13/19 1102          Gait/Stairs Assessment/Training    Gait/Stairs Assessment/Training  gait/ambulation assistive device  -PH     [place] Level (Gait)  verbal cues;contact guard  -PH     Assistive Device (Gait)  walker, front-wheeled  -PH     Distance in Feet (Gait)  100  -PH     Pattern (Gait)  step-through  -PH     Deviations/Abnormal Patterns (Gait)  kiana decreased;gait speed decreased;stride length decreased  -PH     Bilateral Gait Deviations  forward flexed posture  -PH     Comment (Gait/Stairs)  Pt exhibited signs of fatigue and SOA but kept ambulating when asked if she needed to stop and rest.  -PH       User Key  (r) = Recorded By, (t) = Taken By, (c) = Cosigned By    Initials Name Provider Type    Manju Cage PTA Physical Therapy Assistant        Obj/Interventions    No documentation.       Goals/Plan    No documentation.       Clinical Impression     Row Name 09/13/19 1105          Pain Assessment    Additional Documentation  Pain Scale: Numbers Pre/Post-Treatment (Group)  -PH     Row Name 09/13/19 1105          Pain Scale: Numbers Pre/Post-Treatment    Pain Scale: Numbers, Pretreatment  0/10 - no pain  -PH     Pain Scale: Numbers, Post-Treatment  0/10 - no pain  -PH     Row Name 09/13/19 1105          Vital Signs    Pre SpO2 (%)  91  -PH      O2 Delivery Pre Treatment  supplemental O2  -PH     Post SpO2 (%)  96  -PH     O2 Delivery Post Treatment  supplemental O2  -PH     Row Name 09/13/19 1105          Positioning and Restraints    Pre-Treatment Position  in bed  -PH     Post Treatment Position  bed  -PH     In Bed  call light within reach;encouraged to call for assist;supine  -PH       User Key  (r) = Recorded By, (t) = Taken By, (c) = Cosigned By    Initials Name Provider Type    Manju Cage PTA Physical Therapy Assistant        Outcome Measures     Row Name 09/13/19 1113          How much help from another person do you currently need...    Turning from your back to your side while in flat bed without using bedrails?  3  -PH     Moving from lying on back to sitting on the side of a flat bed without bedrails?  3  -PH     Moving to and from a bed to a chair (including a wheelchair)?  3  -PH     Standing up from a chair using your arms (e.g., wheelchair, bedside chair)?  3  -PH     Climbing 3-5 steps with a railing?  2  -PH     To walk in hospital room?  3  -PH     AM-PAC 6 Clicks Score (PT)  17  -PH     Row Name 09/13/19 1113          Functional Assessment    Outcome Measure Options  AM-PAC 6 Clicks Basic Mobility (PT)  -PH       User Key  (r) = Recorded By, (t) = Taken By, (c) = Cosigned By    Initials Name Provider Type     Manju Lockett PTA Physical Therapy Assistant        Physical Therapy Education     Title: PT OT SLP Therapies (Done)     Topic: Physical Therapy (Done)     Point: Mobility training (Done)     Learning Progress Summary           Patient Acceptance, E,TB, VU,DU by PH at 9/13/2019 11:08 AM    Acceptance, E,D, VU,NR by MS at 9/12/2019 11:39 AM                   Point: Home exercise program (Done)     Learning Progress Summary           Patient Acceptance, E,D, VU,NR by MS at 9/12/2019 11:39 AM                   Point: Body mechanics (Done)     Learning Progress Summary           Patient Acceptance, E,TB,  VU,DU by  at 9/13/2019 11:08 AM    Acceptance, E,D, VU,NR by MS at 9/12/2019 11:39 AM                   Point: Precautions (Done)     Learning Progress Summary           Patient Acceptance, E,TB, VU,DU by  at 9/13/2019 11:08 AM    Acceptance, E,D, VU,NR by MS at 9/12/2019 11:39 AM                               User Key     Initials Effective Dates Name Provider Type Discipline    MS 04/03/18 -  Radames Aguilar, PT Physical Therapist PT     08/20/19 -  Manju Lockett PTA Physical Therapy Assistant PT              PT Recommendation and Plan     Plan of Care Reviewed With: patient  Progress: improving  Outcome Summary: Pt is able to get out of bed and ambulate with little assist, but she displays unsteadiness, SOA and fatigue during gait. Pt would likely benefit from continue PT to improve strength, mobility, endurance, and safety.      Time Calculation:   PT Charges     Row Name 09/13/19 1113             Time Calculation    Start Time  1040  -PH      Stop Time  1056  -PH      Time Calculation (min)  16 min  -      PT Received On  09/13/19  -      PT - Next Appointment  09/14/19  -        User Key  (r) = Recorded By, (t) = Taken By, (c) = Cosigned By    Initials Name Provider Type     Manju Lockett PTA Physical Therapy Assistant        Therapy Charges for Today     Code Description Service Date Service Provider Modifiers Qty    99907960745 HC PT THER PROC EA 15 MIN 9/13/2019 Manju Lockett PTA GP 1          PT G-Codes  Outcome Measure Options: AM-PAC 6 Clicks Basic Mobility (PT)  AM-PAC 6 Clicks Score (PT): 17    Manju Lockett PTA  9/13/2019

## 2019-09-13 NOTE — PROGRESS NOTES
Name: Jazmine Brown ADMIT: 2019   : 1926  PCP: Gerard Jama MD    MRN: [id number] LOS: 2 days   AGE/SEX: 89 y.o. female  ROOM: [facility]     Subjective   Subjective   CC: dyspnea  No acute events.  Patient overall feels much better.  Dyspnea is improved.  No CP/f/c/n/v/d.    Objective   Objective   Vital Signs  Temp:  [97.4 °F (36.3 °C)-98.6 °F (37 °C)] 97.6 °F (36.4 °C)  Heart Rate:  [74-79] 75  Resp:  [16-20] 16  BP: ()/(55-75) 90/55  SpO2:  [94 %-95 %] 94 %  on  Flow (L/min):  [1] 1;   Device (Oxygen Therapy): nasal cannula  Body mass index is 20.73 kg/m².  Physical Exam   Constitutional: She is oriented to person, place, and time. No distress.   HENT:   Head: Normocephalic and atraumatic.   Mouth/Throat: Oropharynx is clear and moist.   Eyes: Conjunctivae and EOM are normal. Pupils are equal, round, and reactive to light.   Neck: Normal range of motion. Neck supple.   Cardiovascular: Normal rate, regular rhythm and intact distal pulses.   Pulmonary/Chest: Effort normal and breath sounds normal.   Abdominal: Soft. Bowel sounds are normal.   Musculoskeletal: She exhibits edema (trace BLE). She exhibits no tenderness.   Neurological: She is alert and oriented to person, place, and time.   Skin: Skin is warm and dry. She is not diaphoretic.   Psychiatric: She has a normal mood and affect. Her behavior is normal.   Nursing note and vitals reviewed.      Results Review:       I reviewed the patient's new clinical results.  Results from last 7 days   Lab Units 19  0544 19  0648 19  1257   WBC 10*3/mm3 7.90 7.29 6.74   HEMOGLOBIN g/dL 10.8* 9.8* 10.0*   PLATELETS 10*3/mm3 230 238 251     Results from last 7 days   Lab Units 19  0544 19  0648 19  1257   SODIUM mmol/L 140 140 141   POTASSIUM mmol/L 4.1 4.4 4.9   CHLORIDE mmol/L 95* 98 101   CO2 mmol/L 34.1* 30.3* 32.0*   BUN mg/dL 31* 22 13   CREATININE mg/dL 1.31* 0.88 0.83   GLUCOSE mg/dL 77 116* 87    Estimated Creatinine Clearance: 21.1 mL/min (A) (by C-G formula based on SCr of 1.31 mg/dL (H)).  Results from last 7 days   Lab Units 09/11/19  1257   ALBUMIN g/dL 3.90   BILIRUBIN mg/dL 0.4   ALK PHOS U/L 81   AST (SGOT) U/L 24   ALT (SGPT) U/L 7     Results from last 7 days   Lab Units 09/13/19  0544 09/12/19  0648 09/11/19  1257   CALCIUM mg/dL 8.4 8.7 8.9   ALBUMIN g/dL  --   --  3.90       No results found for: HGBA1C, POCGLU      bumetanide 0.5 mg Oral Daily   carvedilol 6.25 mg Oral BID With Meals   enoxaparin 40 mg Subcutaneous Q24H   ipratropium-albuterol 3 mL Nebulization 4x Daily - RT   levothyroxine 75 mcg Oral Daily   lisinopril 5 mg Oral Daily   PARoxetine 20 mg Oral Daily   potassium chloride 10 mEq Oral Daily      Diet Regular; Cardiac       Assessment/Plan     Active Hospital Problems    Diagnosis  POA   • **Heart failure, acute on chronic, systolic and diastolic (CMS/HCC) [I50.43]  Yes   • Chronic respiratory failure with hypoxia (CMS/HCC) [J96.11]  Yes   • Essential hypertension [I10]  Yes   • Chronic fatigue [R53.82]  Yes   • Moderate to severe pulmonary hypertension (CMS/HCC) [I27.20]  Yes   • Atrial fibrillation (CMS/HCC) [I48.91]  Yes   • Cardiomyopathy (CMS/HCC) [I42.9]  Yes   • Cardiac pacemaker in situ [Z95.0]  Yes      Resolved Hospital Problems   No resolved problems to display.   Acute on Chronic Systolic and Diastolic CHF  - much better following IV lasix  - observe on oral bumex today  - BMP in AM  - echocardiogram pending  - wean oxygen as tolerated    VTE Prophylaxis - Lovenox 40 mg SC daily  Code Status - Full code  Disposition - Anticipate discharge to home with HH vs SNF in 1-2d.      Radames Arce MD  [facility]  09/13/19  4:29 PM

## 2019-09-14 NOTE — PLAN OF CARE
Problem: Patient Care Overview  Goal: Plan of Care Review   09/14/19 1122   Coping/Psychosocial   Plan of Care Reviewed With patient   Plan of Care Review   Progress improving   OTHER   Outcome Summary Ms. Brown demonstrates good tolerance for skilled PT on room air this date, SpO2 stable. Performed multiple STS transfers during toileting and prior to ambulating in the romero. Verbal cues for rolling walker navigation for safety. Overall progressing toward functional goals.

## 2019-09-14 NOTE — THERAPY TREATMENT NOTE
Patient Name: Jazmine Brown  : 1926    MRN: [id number]                              Today's Date: 2019       Admit Date: 2019    Visit Dx:     ICD-10-CM ICD-9-CM   1. Acute on chronic congestive heart failure, unspecified heart failure type (CMS/HCC) I50.9 428.0   2. Pulmonary hypertension (CMS/HCC) I27.20 416.8     Patient Active Problem List   Diagnosis   • Fatigue   • Hypothyroidism   • Atrial fibrillation (CMS/HCC)   • Acute cystitis without hematuria   • Shortness of breath   • Heart failure, acute on chronic, systolic and diastolic (CMS/HCC)   • Acute contact otitis externa of left ear   • Essential hypertension   • Chronic fatigue   • Cardiac pacemaker in situ   • Cardiomyopathy (CMS/HCC)   • Moderate to severe pulmonary hypertension (CMS/HCC)   • Hematoma   • Major depressive disorder   • Fall at home   • Anemia   • Chronic respiratory failure with hypoxia (CMS/HCC)     Past Medical History:   Diagnosis Date   • A-fib (CMS/HCC)    • CHF (congestive heart failure) (CMS/HCC)    • Depression    • Hypertension    • Polyp, sigmoid colon    • Pulmonary hypertension (CMS/HCC)    • Thyroid cancer (CMS/HCC)      Past Surgical History:   Procedure Laterality Date   • CARDIAC PACEMAKER PLACEMENT     • HYSTERECTOMY     • PACEMAKER IMPLANTATION     • TONSILLECTOMY AND ADENOIDECTOMY     • TOTAL THYROIDECTOMY       General Information     Row Name 19 1117          PT Evaluation Time/Intention    Document Type  therapy note (daily note)  -RS     Mode of Treatment  physical therapy  -RS     Row Name 19 1117          General Information    Patient Profile Reviewed?  yes  -RS       User Key  (r) = Recorded By, (t) = Taken By, (c) = Cosigned By    Initials Name Provider Type    RS Brenda Garcia, PT Physical Therapist        Mobility     Row Name 19 1117          Bed Mobility Assessment/Treatment    Bed Mobility Assessment/Treatment  supine-sit;sit-supine  -RS     Supine-Sit [place]  (Bed Mobility)  supervision  -RS     Sit-Supine [place] (Bed Mobility)  contact guard  -RS     Assistive Device (Bed Mobility)  head of bed elevated  -RS     Comment (Bed Mobility)  with increased time  -RS     Row Name 09/14/19 1117          Transfer Assessment/Treatment    Comment (Transfers)  pt performs 5 STS transfers throughout PT session during toilet transfer as well as from EOB  -RS     Row Name 09/14/19 1117          Sit-Stand Transfer    Sit-Stand [place] (Transfers)  contact guard  -RS     Assistive Device (Sit-Stand Transfers)  walker, front-wheeled  -RS     Row Name 09/14/19 1117          Gait/Stairs Assessment/Training    Gait/Stairs Assessment/Training  gait/ambulation assistive device  -RS     [place] Level (Gait)  contact guard;1 person assist;verbal cues  -RS     Assistive Device (Gait)  walker, front-wheeled  -RS     Distance in Feet (Gait)  80  -RS     Pattern (Gait)  step-through  -RS     Deviations/Abnormal Patterns (Gait)  kiana decreased;stride length decreased  -RS     Bilateral Gait Deviations  forward flexed posture  -RS     Comment (Gait/Stairs)  Pt ambulates in room during toilet transfer and in romero, 20 feet in room, 60 feet in romero  -RS       User Key  (r) = Recorded By, (t) = Taken By, (c) = Cosigned By    Initials Name Provider Type    RS Brenda Garcia, PT Physical Therapist        Obj/Interventions    No documentation.       Goals/Plan    No documentation.       Clinical Impression     Row Name 09/14/19 1120          Pain Scale: Numbers Pre/Post-Treatment    Pain Scale: Numbers, Pretreatment  0/10 - no pain  -RS     Pain Scale: Numbers, Post-Treatment  0/10 - no pain  -RS     Row Name 09/14/19 1120          Plan of Care Review    Plan of Care Reviewed With  patient;family  -RS     Row Name 09/14/19 1120          Physical Therapy Clinical Impression    Criteria for Skilled Interventions Met (PT Clinical Impression)  yes  -RS     Rehab Potential (PT Clinical  Summary)  good, to achieve stated therapy goals  -RS     Row Name 09/14/19 1120          Vital Signs    O2 Delivery Pre Treatment  room air  -RS     O2 Delivery Intra Treatment  room air  -RS     Row Name 09/14/19 1120          Positioning and Restraints    Pre-Treatment Position  in bed  -RS     Post Treatment Position  chair  -RS     In Chair  with other staff;encouraged to call for assist;call light within reach;reclined;with family/caregiver  -RS       User Key  (r) = Recorded By, (t) = Taken By, (c) = Cosigned By    Initials Name Provider Type    Brenda Christensen PT Physical Therapist        Outcome Measures     Row Name 09/14/19 1122          How much help from another person do you currently need...    Turning from your back to your side while in flat bed without using bedrails?  3  -RS     Moving from lying on back to sitting on the side of a flat bed without bedrails?  3  -RS     Moving to and from a bed to a chair (including a wheelchair)?  3  -RS     Standing up from a chair using your arms (e.g., wheelchair, bedside chair)?  3  -RS     Climbing 3-5 steps with a railing?  2  -RS     To walk in hospital room?  3  -RS     AM-PAC 6 Clicks Score (PT)  17  -RS     Row Name 09/14/19 1122          Functional Assessment    Outcome Measure Options  AM-PAC 6 Clicks Basic Mobility (PT)  -RS       User Key  (r) = Recorded By, (t) = Taken By, (c) = Cosigned By    Initials Name Provider Type    Brenda Christensen PT Physical Therapist        Physical Therapy Education     Title: PT OT SLP Therapies (Done)     Topic: Physical Therapy (Done)     Point: Mobility training (Done)     Learning Progress Summary           Patient Acceptance, E, VU,NR by RS at 9/14/2019 11:21 AM    Acceptance, E,TB, VU,DU by PH at 9/13/2019 11:08 AM    Acceptance, E,D, VU,NR by MS at 9/12/2019 11:39 AM   Family Acceptance, E, VU,NR by RS at 9/14/2019 11:21 AM                   Point: Home exercise program (Done)     Learning Progress  Summary           Patient Acceptance, E, VU,NR by RS at 9/14/2019 11:21 AM    Acceptance, E,D, VU,NR by MS at 9/12/2019 11:39 AM   Family Acceptance, E, VU,NR by RS at 9/14/2019 11:21 AM                   Point: Body mechanics (Done)     Learning Progress Summary           Patient Acceptance, E, VU,NR by RS at 9/14/2019 11:21 AM    Acceptance, E,TB, VU,DU by PH at 9/13/2019 11:08 AM    Acceptance, E,D, VU,NR by MS at 9/12/2019 11:39 AM   Family Acceptance, E, VU,NR by RS at 9/14/2019 11:21 AM                   Point: Precautions (Done)     Learning Progress Summary           Patient Acceptance, E, VU,NR by RS at 9/14/2019 11:21 AM    Acceptance, E,TB, VU,DU by  at 9/13/2019 11:08 AM    Acceptance, E,D, VU,NR by MS at 9/12/2019 11:39 AM   Family Acceptance, E, VU,NR by RS at 9/14/2019 11:21 AM                               User Key     Initials Effective Dates Name Provider Type Discipline    MS 04/03/18 -  Radames Aguilar PT Physical Therapist PT     04/03/19 -  Brenda Garcia, PT Physical Therapist PT     08/20/19 -  Manju Lockett PTA Physical Therapy Assistant PT              PT Recommendation and Plan     Outcome Summary/Treatment Plan (PT)  Anticipated Discharge Disposition (PT): home with assist, home with home health  Plan of Care Reviewed With: patient  Progress: improving  Outcome Summary: Ms. Brown demonstrates good tolerance for skilled PT on room air this date, SpO2 stable. Performed multiple STS transfers during toileting and prior to ambulating in the romero. Verbal cues for rolling walker navigation for safety. Overall progressing toward functional goals.     Time Calculation:   PT Charges     Row Name 09/14/19 1123             Time Calculation    Start Time  1100  -RS      Stop Time  1123  -RS      Time Calculation (min)  23 min  -RS        User Key  (r) = Recorded By, (t) = Taken By, (c) = Cosigned By    Initials Name Provider Type    RS Brenda Garcia, PT Physical Therapist         Therapy Charges for Today     Code Description Service Date Service Provider Modifiers Qty    30489500390 HC PT THER PROC EA 15 MIN 9/14/2019 Brenda Garcia, PT GP 2          PT G-Codes  Outcome Measure Options: AM-PAC 6 Clicks Basic Mobility (PT)  AM-PAC 6 Clicks Score (PT): 17    Brenda Garcia, ARANZA  9/14/2019

## 2019-09-14 NOTE — PLAN OF CARE
Problem: Patient Care Overview  Goal: Plan of Care Review  Outcome: Ongoing (interventions implemented as appropriate)   09/14/19 1800   Coping/Psychosocial   Plan of Care Reviewed With family;patient   Plan of Care Review   Progress no change   OTHER   Outcome Summary Pt has had no moments of confusion today, has been A&Ox4 all day. Was going to be D/C'd, but due to extreme confusion last night, no D/C and melatonin ordered to help with sleep tonight. Pt has napped on and off all day, oxygen replaced at 2L per NC due to Pt sleeping frequently off and on throughout the day and sats dropping while sleeping. She has had no complaints. VSS. Will continue to monitor.     Goal: Individualization and Mutuality  Outcome: Ongoing (interventions implemented as appropriate)    Goal: Interprofessional Rounds/Family Conf  Outcome: Ongoing (interventions implemented as appropriate)      Problem: Fall Risk (Adult)  Goal: Absence of Fall  Outcome: Ongoing (interventions implemented as appropriate)      Problem: Skin Injury Risk (Adult)  Goal: Skin Health and Integrity  Outcome: Ongoing (interventions implemented as appropriate)      Problem: Cardiac: Heart Failure (Adult)  Goal: Signs and Symptoms of Listed Potential Problems Will be Absent, Minimized or Managed (Cardiac: Heart Failure)  Outcome: Ongoing (interventions implemented as appropriate)

## 2019-09-14 NOTE — PLAN OF CARE
Problem: Patient Care Overview  Goal: Plan of Care Review  Outcome: Ongoing (interventions implemented as appropriate)   09/14/19 0557   Coping/Psychosocial   Plan of Care Reviewed With patient   Plan of Care Review   Progress declining   OTHER   Outcome Summary Patient showing increasing confusion overnight, even compared to the previous night. She was repeatedly attempting to get out of bed and pulling at monitor leads. She was alert only to self, and as the night progressed she became distressed and tearful because she did not know where she was or where her family was. Son came to the bedside for an hour, but patient remained confused; one time dose ativan ordered, and eventually patient was able to rest for several hours. Purewick remains in place. No complaints of pain. VSS       Problem: Fall Risk (Adult)  Goal: Absence of Fall  Outcome: Ongoing (interventions implemented as appropriate)      Problem: Skin Injury Risk (Adult)  Goal: Skin Health and Integrity  Outcome: Ongoing (interventions implemented as appropriate)      Problem: Cardiac: Heart Failure (Adult)  Goal: Signs and Symptoms of Listed Potential Problems Will be Absent, Minimized or Managed (Cardiac: Heart Failure)  Outcome: Ongoing (interventions implemented as appropriate)

## 2019-09-14 NOTE — PROGRESS NOTES
Name: Jazmine Brown ADMIT: 2019   : 1926  PCP: Gerard Jama MD    MRN: [id number] LOS: 3 days   AGE/SEX: 89 y.o. female  ROOM: [facility]     Subjective   Subjective   CC: dyspnea  No acute events.  Patient became confused and restless last PM, much better now. Dyspnea is improved.  No CP/f/c/n/v/d.    Objective   Objective   Vital Signs  Temp:  [97.5 °F (36.4 °C)-98.6 °F (37 °C)] 97.5 °F (36.4 °C)  Heart Rate:  [] 79  Resp:  [16-22] 18  BP: (120-150)/(52-87) 121/52  SpO2:  [90 %-100 %] 100 %  on  Flow (L/min):  [2] 2;   Device (Oxygen Therapy): nasal cannula  Body mass index is 19.91 kg/m².  Physical Exam   Constitutional: She is oriented to person, place, and time. No distress.   HENT:   Head: Normocephalic and atraumatic.   Mouth/Throat: Oropharynx is clear and moist.   Eyes: Conjunctivae and EOM are normal. Pupils are equal, round, and reactive to light.   Neck: Normal range of motion. Neck supple.   Cardiovascular: Normal rate, regular rhythm and intact distal pulses.   Pulmonary/Chest: Effort normal and breath sounds normal.   Abdominal: Soft. Bowel sounds are normal.   Musculoskeletal: She exhibits edema (trace BLE). She exhibits no tenderness.   Neurological: She is alert and oriented to person, place, and time.   Skin: Skin is warm and dry. She is not diaphoretic.   Psychiatric: She has a normal mood and affect. Her behavior is normal.   Nursing note and vitals reviewed.      Results Review:       I reviewed the patient's new clinical results.  Results from last 7 days   Lab Units 19  0544 19  0648 19  1257   WBC 10*3/mm3 7.90 7.29 6.74   HEMOGLOBIN g/dL 10.8* 9.8* 10.0*   PLATELETS 10*3/mm3 230 238 251     Results from last 7 days   Lab Units 19  0544 19  0648 09/11/19  1257   SODIUM mmol/L 140 140 141   POTASSIUM mmol/L 4.1 4.4 4.9   CHLORIDE mmol/L 95* 98 101   CO2 mmol/L 34.1* 30.3* 32.0*   BUN mg/dL 31* 22 13   CREATININE mg/dL 1.31* 0.88 0.83    GLUCOSE mg/dL 77 116* 87   Estimated Creatinine Clearance: 20.2 mL/min (A) (by C-G formula based on SCr of 1.31 mg/dL (H)).  Results from last 7 days   Lab Units 09/11/19  1257   ALBUMIN g/dL 3.90   BILIRUBIN mg/dL 0.4   ALK PHOS U/L 81   AST (SGOT) U/L 24   ALT (SGPT) U/L 7     Results from last 7 days   Lab Units 09/13/19  0544 09/12/19  0648 09/11/19  1257   CALCIUM mg/dL 8.4 8.7 8.9   ALBUMIN g/dL  --   --  3.90       No results found for: HGBA1C, POCGLU      bumetanide 0.5 mg Oral Once   [START ON 9/15/2019] bumetanide 1 mg Oral Daily   carvedilol 6.25 mg Oral BID With Meals   enoxaparin 40 mg Subcutaneous Q24H   ipratropium-albuterol 3 mL Nebulization 4x Daily - RT   levothyroxine 75 mcg Oral Daily   lisinopril 5 mg Oral Daily   melatonin 5 mg Oral Nightly   PARoxetine 20 mg Oral Daily   potassium chloride 10 mEq Oral Daily      Diet Regular; Cardiac       Assessment/Plan     Active Hospital Problems    Diagnosis  POA   • **Heart failure, acute on chronic, systolic and diastolic (CMS/HCC) [I50.43]  Yes   • Chronic respiratory failure with hypoxia (CMS/HCC) [J96.11]  Yes   • Essential hypertension [I10]  Yes   • Chronic fatigue [R53.82]  Yes   • Moderate to severe pulmonary hypertension (CMS/HCC) [I27.20]  Yes   • Atrial fibrillation (CMS/HCC) [I48.91]  Yes   • Cardiomyopathy (CMS/HCC) [I42.9]  Yes   • Cardiac pacemaker in situ [Z95.0]  Yes      Resolved Hospital Problems   No resolved problems to display.   Acute on Chronic Systolic and Diastolic CHF  - much better following IV lasix, now on oral bumex, will increase to 1mg today  - echocardiogram pending  - wean oxygen as tolerated    Confusion  - overnight, likely hospital delirium-now at baseline  - schedule melatonin    VTE Prophylaxis - Lovenox 40 mg SC daily  Code Status - Full code  Disposition - Anticipate discharge to home with  tomorrow      Radames Arce MD  [facility]  09/14/19  3:44 PM

## 2019-09-15 NOTE — THERAPY TREATMENT NOTE
Patient Name: Jazmine Brown  : 1926    MRN: [id number]                              Today's Date: 9/15/2019       Admit Date: 2019    Visit Dx:     ICD-10-CM ICD-9-CM   1. Acute on chronic congestive heart failure, unspecified heart failure type (CMS/HCC) I50.9 428.0   2. Pulmonary hypertension (CMS/HCC) I27.20 416.8     Patient Active Problem List   Diagnosis   • Fatigue   • Hypothyroidism   • Atrial fibrillation (CMS/HCC)   • Acute cystitis without hematuria   • Shortness of breath   • Heart failure, acute on chronic, systolic and diastolic (CMS/HCC)   • Acute contact otitis externa of left ear   • Essential hypertension   • Chronic fatigue   • Cardiac pacemaker in situ   • Cardiomyopathy (CMS/HCC)   • Moderate to severe pulmonary hypertension (CMS/HCC)   • Hematoma   • Major depressive disorder   • Fall at home   • Anemia   • Chronic respiratory failure with hypoxia (CMS/HCC)     Past Medical History:   Diagnosis Date   • A-fib (CMS/HCC)    • CHF (congestive heart failure) (CMS/HCC)    • Depression    • Hypertension    • Polyp, sigmoid colon    • Pulmonary hypertension (CMS/HCC)    • Thyroid cancer (CMS/HCC)      Past Surgical History:   Procedure Laterality Date   • CARDIAC PACEMAKER PLACEMENT     • HYSTERECTOMY     • PACEMAKER IMPLANTATION     • TONSILLECTOMY AND ADENOIDECTOMY     • TOTAL THYROIDECTOMY       General Information     Row Name 09/15/19 1408          PT Evaluation Time/Intention    Document Type  therapy note (daily note)  -RS     Mode of Treatment  physical therapy  -RS     Row Name 09/15/19 1408          General Information    Patient Profile Reviewed?  yes  -RS       User Key  (r) = Recorded By, (t) = Taken By, (c) = Cosigned By    Initials Name Provider Type    RS Brenda Garcia, PT Physical Therapist        Mobility     Row Name 09/15/19 1408          Bed Mobility Assessment/Treatment    Supine-Sit [place] (Bed Mobility)  supervision  -RS     Sit-Supine [place] (Bed  Mobility)  supervision  -RS     Assistive Device (Bed Mobility)  head of bed elevated  -RS     Row Name 09/15/19 1408          Sit-Stand Transfer    Sit-Stand [place] (Transfers)  supervision  -RS     Assistive Device (Sit-Stand Transfers)  walker, front-wheeled  -RS     Row Name 09/15/19 1408          Gait/Stairs Assessment/Training    Gait/Stairs Assessment/Training  gait/ambulation assistive device  -RS     [place] Level (Gait)  contact guard;verbal cues  -RS     Assistive Device (Gait)  walker, front-wheeled  -RS     Distance in Feet (Gait)  80 feet in hallway  -RS     Pattern (Gait)  step-through  -RS     Deviations/Abnormal Patterns (Gait)  kiana decreased;stride length decreased  -RS     Bilateral Gait Deviations  forward flexed posture  -RS       User Key  (r) = Recorded By, (t) = Taken By, (c) = Cosigned By    Initials Name Provider Type    Brenda Christensen PT Physical Therapist        Obj/Interventions    No documentation.       Goals/Plan    No documentation.       Clinical Impression    No documentation.       Outcome Measures     Row Name 09/15/19 1410          How much help from another person do you currently need...    Turning from your back to your side while in flat bed without using bedrails?  4  -RS     Moving from lying on back to sitting on the side of a flat bed without bedrails?  3  -RS     Moving to and from a bed to a chair (including a wheelchair)?  3  -RS     Climbing 3-5 steps with a railing?  2  -RS     To walk in hospital room?  3  -RS     Row Name 09/15/19 1410          Functional Assessment    Outcome Measure Options  AM-PAC 6 Clicks Basic Mobility (PT)  -RS       User Key  (r) = Recorded By, (t) = Taken By, (c) = Cosigned By    Initials Name Provider Type    Brenda Christensen PT Physical Therapist        Physical Therapy Education     Title: PT OT SLP Therapies (Done)     Topic: Physical Therapy (Done)     Point: Mobility training (Done)     Learning Progress  Summary           Patient Acceptance, E, NR,VU by RS at 9/15/2019  2:09 PM    Acceptance, E, VU,NR by RS at 9/14/2019 11:21 AM    Acceptance, E,TB, VU,DU by PH at 9/13/2019 11:08 AM    Acceptance, E,D, VU,NR by MS at 9/12/2019 11:39 AM   Family Acceptance, E, NR,VU by RS at 9/15/2019  2:09 PM    Acceptance, E, VU,NR by RS at 9/14/2019 11:21 AM                   Point: Home exercise program (Done)     Learning Progress Summary           Patient Acceptance, E, NR,VU by RS at 9/15/2019  2:09 PM    Acceptance, E, VU,NR by RS at 9/14/2019 11:21 AM    Acceptance, E,D, VU,NR by MS at 9/12/2019 11:39 AM   Family Acceptance, E, NR,VU by RS at 9/15/2019  2:09 PM    Acceptance, E, VU,NR by RS at 9/14/2019 11:21 AM                   Point: Body mechanics (Done)     Learning Progress Summary           Patient Acceptance, E, NR,VU by RS at 9/15/2019  2:09 PM    Acceptance, E, VU,NR by RS at 9/14/2019 11:21 AM    Acceptance, E,TB, VU,DU by PH at 9/13/2019 11:08 AM    Acceptance, E,D, VU,NR by MS at 9/12/2019 11:39 AM   Family Acceptance, E, NR,VU by RS at 9/15/2019  2:09 PM    Acceptance, E, VU,NR by RS at 9/14/2019 11:21 AM                   Point: Precautions (Done)     Learning Progress Summary           Patient Acceptance, E, NR,VU by RS at 9/15/2019  2:09 PM    Acceptance, E, VU,NR by RS at 9/14/2019 11:21 AM    Acceptance, E,TB, VU,DU by  at 9/13/2019 11:08 AM    Acceptance, E,D, VU,NR by MS at 9/12/2019 11:39 AM   Family Acceptance, E, NR,VU by RS at 9/15/2019  2:09 PM    Acceptance, E, VU,NR by RS at 9/14/2019 11:21 AM                               User Key     Initials Effective Dates Name Provider Type Discipline    MS 04/03/18 -  Radames Aguilar, PT Physical Therapist PT    RS 04/03/19 -  Brenda Garcia, PT Physical Therapist PT    PH 08/20/19 -  Manju Lockett PTA Physical Therapy Assistant PT              PT Recommendation and Plan     Outcome Summary/Treatment Plan (PT)  Anticipated Discharge  Disposition (PT): home with assist, home with home health  Plan of Care Reviewed With: patient  Progress: improving  Outcome Summary: Ms. Brown ambulates 80 feet in hallway with CGA this date, note dimproved functional activity tolerance and improved STS from EOB. She requires cues for rolling walker navigation and safety.     Time Calculation:   PT Charges     Row Name 09/15/19 1411             Time Calculation    Start Time  1350  -RS      Stop Time  1408  -RS      Time Calculation (min)  18 min  -RS        User Key  (r) = Recorded By, (t) = Taken By, (c) = Cosigned By    Initials Name Provider Type    RS Brenda Garcia, PT Physical Therapist        Therapy Charges for Today     Code Description Service Date Service Provider Modifiers Qty    64803060637 HC PT THER PROC EA 15 MIN 9/14/2019 Brenda Garcia, PT GP 2    69630458537 HC PT THER PROC EA 15 MIN 9/15/2019 Brenda Garcia PT GP 2          PT G-Codes  Outcome Measure Options: AM-PAC 6 Clicks Basic Mobility (PT)  AM-PAC 6 Clicks Score (PT): 17    Brenda Garcia PT  9/15/2019

## 2019-09-15 NOTE — PLAN OF CARE
Problem: Patient Care Overview  Goal: Plan of Care Review  Outcome: Ongoing (interventions implemented as appropriate)   09/15/19 0358   Coping/Psychosocial   Plan of Care Reviewed With patient   Plan of Care Review   Progress improving   OTHER   Outcome Summary Patient's confusion much improved overnight. Only moments of confusion, but otherwise alert and oriented. Resting well with the addition of melatonin at bedtime. Up to the bedside commode with assistance. No complaints of pain. VSS       Problem: Fall Risk (Adult)  Goal: Absence of Fall  Outcome: Ongoing (interventions implemented as appropriate)      Problem: Skin Injury Risk (Adult)  Goal: Skin Health and Integrity  Outcome: Ongoing (interventions implemented as appropriate)      Problem: Cardiac: Heart Failure (Adult)  Goal: Signs and Symptoms of Listed Potential Problems Will be Absent, Minimized or Managed (Cardiac: Heart Failure)  Outcome: Ongoing (interventions implemented as appropriate)

## 2019-09-15 NOTE — DISCHARGE PLACEMENT REQUEST
"Jazmine Brown (89 y.o. Female)     Date of Birth Social Security Number Address Home Phone MRN    02/11/1926  [id number]  [facility] [address] [phone] [id number]    [employer] Marital Status          [place]        Admission Date Admission Type Admitting Provider Attending Provider Department, Room/Bed    9/11/19 Emergency RayPedro MD Lykins, Matthew D, MD [address], [address]    Discharge Date Discharge Disposition Discharge Destination                       Attending Provider:  Radames Arce MD    Allergies:  Codeine, Latex    Isolation:  None   Infection:  None   Code Status:  No CPR    Ht:  154.9 cm (61\")   Wt:  48.1 kg (106 lb)    Admission Cmt:  None   Principal Problem:  Heart failure, acute on chronic, systolic and diastolic (CMS/HCC) [I50.43]                 Active Insurance as of 9/11/2019     Primary Coverage     Payor Plan Insurance Group Employer/Plan Group    MEDICARE MEDICARE A & B      Payor Plan Address Payor Plan Phone Number Payor Plan Fax Number Effective Dates    [address] [phone]  2/1/1991 - None Entered    [facility] [id number]       Subscriber Name Subscriber Birth Date Member ID       JAZMINE BROWN 2/11/1926 [id number]           Secondary Coverage     Payor Plan Insurance Group Employer/Plan Group    [facility] SUPP [id number]     Payor Plan Address Payor Plan Phone Number Payor Plan Fax Number Effective Dates    [address]   12/1/2016 - None Entered    [facility] [id number]       Subscriber Name Subscriber Birth Date Member ID       JAZMINE BROWN 2/11/1926 [id number]                 Emergency Contacts      (Rel.) Home Phone Work Phone Mobile Phone    Marcellus Brown (Son) -- [phone]    BrownSuleiman dixon (Son) [phone] -- [phone]    CHARLES BOURGEOIS (Grandchild) -- -- [phone]              "

## 2019-09-15 NOTE — PLAN OF CARE
Problem: Patient Care Overview  Goal: Plan of Care Review   09/15/19 1410   Coping/Psychosocial   Plan of Care Reviewed With patient   Plan of Care Review   Progress improving   OTHER   Outcome Summary Ms. Brown ambulates 80 feet in hallway with CGA this date, note dimproved functional activity tolerance and improved STS from EOB. She requires cues for rolling walker navigation and safety.

## 2019-09-15 NOTE — PROGRESS NOTES
Exercise Oximetry    Patient Name:Jazmine Brown   MRN: [id number]   Date: 09/15/19             ROOM AIR BASELINE   SpO2% 92   Heart Rate    Blood Pressure      EXERCISE ON ROOM AIR SpO2% EXERCISE ON O2 @ 1 LPM SpO2%   1 MINUTE 89 1 MINUTE    2 MINUTES 86 2 MINUTES    3 MINUTES  3 MINUTES 88   4 MINUTES  4 MINUTES 88   5 MINUTES  5 MINUTES 89   6 MINUTES  6 MINUTES 91              Distance Walked   Distance Walked   Dyspnea (Pily Scale)   Dyspnea (Pily Scale)   Fatigue (Pily Scale)   Fatigue (Pily Scale)   SpO2% Post Exercise  91 SpO2% Post Exercise   HR Post Exercise   HR Post Exercise   Time to Recovery   Time to Recovery     Comments: At rest SpO2 92% on room air.  Walker at bedside, used to ambulate patient.  Patient stood and took about 5 steps before SpO2 decreased to 86%.  O2 applied on 1 lpm.  SpO2 91% x 4 minutes on 1 lpm.  Patient tolerated well and returned to bed.

## 2019-09-15 NOTE — DISCHARGE SUMMARY
Date of Admission: 9/11/2019  Date of Discharge:  9/15/2019  Primary Care Physician: Gerard Jama MD     Discharge Diagnosis:  Active Hospital Problems    Diagnosis  POA   • **Heart failure, acute on chronic, systolic and diastolic (CMS/HCC) [I50.43]  Yes   • Chronic respiratory failure with hypoxia (CMS/HCC) [J96.11]  Yes   • Essential hypertension [I10]  Yes   • Chronic fatigue [R53.82]  Yes   • Moderate to severe pulmonary hypertension (CMS/HCC) [I27.20]  Yes   • Atrial fibrillation (CMS/HCC) [I48.91]  Yes   • Cardiomyopathy (CMS/HCC) [I42.9]  Yes   • Cardiac pacemaker in situ [Z95.0]  Yes      Resolved Hospital Problems   No resolved problems to display.       Presenting Problem/History of Present Illness:  CHF (congestive heart failure) (CMS/HCC) [I50.9]  Pulmonary hypertension (CMS/HCC) [I27.20]  Acute on chronic congestive heart failure, unspecified heart failure type (CMS/HCC) [I50.9]     Hospital Course:  The patient is a 89 y.o. female with a history of combined systolic and diastolic CHF, atrial fibrillation, and chronic nocturnal hypoxemia on 2L NC at night who presented with shortness of breath and worsened hypoxemia.  Please see admission H&P from 9/11/19 for further details. She was found to be volume overloaded and was given IV diuresis which she tolerated well.  She had no evidence of ACS, and an echocardiogram was very similar to the one from 2017. Her volume status did improve and she was weaned to room air at rest.  She did have a 6-minute walk test and will require 1L of oxygen via nasal cannula with exertion in addition to her 2L at night which she previously had.  She was transitioned to oral bumex which she tolerated well.    Of note, she has followed Dr. Childs at [place] and she and her family request referral to a [facility] Cardiologist with [facility] and I have written this.    She did well with physical therapy and will be discharged home with home health for  "physical therapy and CHF management.  She has caregivers as well and her family members have stated that they will take turns staying with her for the time being.  The patient and her family were agreeable and enthusiastic about the above plan.  She did have a little confusion during the admission which was at night.  This did resolve with nightly melatonin for sleep and she was at her mental baseline at the time of discharge.      Exam Today:  Blood pressure 114/64, pulse 70, temperature 97.5 °F (36.4 °C), temperature source Oral, resp. rate 18, height 154.9 cm (61\"), weight 48.1 kg (106 lb), SpO2 97 %, not currently breastfeeding.  Constitutional: She is oriented to person, place, and time. No distress.   Head: Normocephalic and atraumatic.   Mouth/Throat: Oropharynx is clear and moist.   Eyes: Conjunctivae and EOM are normal. Pupils are equal, round, and reactive to light.   Neck: Normal range of motion. Neck supple.   Cardiovascular: Normal rate, regular rhythm and intact distal pulses.   Pulmonary/Chest: Effort normal and breath sounds normal.  No rales.   Abdominal: Soft. Bowel sounds are normal.   Musculoskeletal: She exhibits trace BLE edema. She exhibits no tenderness.   Neurological: She is alert and oriented to person, place, and time.   Skin: Skin is warm and dry. She is not diaphoretic.   Psychiatric: She has a normal mood and affect. Her behavior is normal.   Nursing note and vitals reviewed.    Procedures Performed:  Jazmine Brown   Echocardiogram   Order# [id number]   Reading physician: Rosmery Meier MD Ordering physician: Adriana Gold MD Study date: 9/15/19   Patient Information     Patient Name  Jazmine Brown MRN  [id number] Sex  Female  (Age)  1926 (89 y.o.)   Admission Information     Admission Date/Time Discharge Date/Time Room/Bed   19  1224  S622/1   Sedation Narrator Report     Sedation Narrator Report   Interpretation Summary     · The following left ventricular " wall segments are hypokinetic: apical inferior, mid inferior, apical septal, mid inferoseptal, mid anteroseptal and basal inferior.  · Estimated EF appears to be in the range of 36 - 40%.  · Left ventricular systolic function is moderately decreased.  · Left ventricular wall thickness is consistent with mild-to-moderate concentric hypertrophy.  · Left atrial cavity size is moderate-to-severely dilated.  · Mild to moderate tricuspid valve regurgitation is present.     Left Ventricle Left ventricular systolic function is moderately decreased. Calculated EF = 48.0%. Estimated EF was in disagreement with the calculated EF. Estimated EF appears to be in the range of 36 - 40%. Normal left ventricular cavity size noted. Global left ventricular wall motion appears abnormal. Left ventricular wall thickness is consistent with mild-to-moderate concentric hypertrophy. Left ventricular diastolic function was indeterminate. Elevated left atrial pressure.   Right Ventricle Normal right ventricular cavity size and systolic function noted. Electronic lead present in the ventricle.   Left Atrium Left atrial cavity size is moderate-to-severely dilated.   Right Atrium Right atrial cavity size is severely dilated. An electronic lead is present in the right atrium.   Aortic Valve The aortic valve is grossly normal in structure. Trace aortic valve regurgitation is present. No aortic valve stenosis is present.   Mitral Valve The mitral valve is abnormal in structure. Mild MAC is present. Trace mitral valve regurgitation is present. No significant mitral valve stenosis is present.   Tricuspid Valve The tricuspid valve is grossly normal. No evidence of tricuspid valve stenosis is present. Mild to moderate tricuspid valve regurgitation is present. Estimated right ventricular systolic pressure from tricuspid regurgitation is normal (<35 mmHg). No evidence of pulmonary hypertension is present.   Pulmonic Valve The pulmonic valve is grossly  normal in structure. There is no significant pulmonic valve stenosis present. There is no pulmonic valve regurgitation present.   Greater Vessels No dilation of the aortic root is present.   Pericardium There is a trivial pericardial effusion. There is no evidence of cardiac tamponade.     Consults:   Consults     Date and Time Order Name Status Description    9/11/2019 1654 [facility] (on-call MD unless specified) Details Completed     9/11/2019 1529 [facility] (on-call MD unless specified) Details Completed            Discharge Disposition:  Home or Self Care    Discharge Medications:     Discharge Medications      New Medications      Instructions Start Date   bumetanide 0.5 MG tablet  Commonly known as:  BUMEX   0.5 mg, Oral, Daily      potassium chloride 10 MEQ CR capsule  Commonly known as:  MICRO-K   10 mEq, Oral, Daily   Start Date:  9/16/2019        Changes to Medications      Instructions Start Date   vitamin D 30925 units capsule capsule  Commonly known as:  ERGOCALCIFEROL  What changed:  additional instructions   50,000 Units, Oral, Weekly         Continue These Medications      Instructions Start Date   acetaminophen 500 MG tablet  Commonly known as:  TYLENOL   1,000 mg, Oral, 2 Times Daily PRN      albuterol sulfate  (90 Base) MCG/ACT inhaler  Commonly known as:  PROAIR HFA   2 puffs, Inhalation, Every 4 Hours PRN      albuterol 1.25 MG/3ML nebulizer solution  Commonly known as:  ACCUNEB   1 ampule, Nebulization, Every 4 Hours PRN      carvedilol 6.25 MG tablet  Commonly known as:  COREG   6.25 mg, Oral, 2 Times Daily With Meals      levothyroxine 75 MCG tablet  Commonly known as:  SYNTHROID, LEVOTHROID   75 mcg, Oral, Daily      lisinopril 5 MG tablet  Commonly known as:  PRINIVIL,ZESTRIL   5 mg, Oral, Daily      multivitamins-minerals capsule capsule   1 capsule, Oral, 2 Times Daily      PARoxetine 20 MG tablet  Commonly known as:  PAXIL   20 mg, Oral, Daily      umeclidinium-vilanterol 62.5-25 MCG/INH  aerosol powder  inhaler  Commonly known as:  ANORO ELLIPTA   1 puff, Inhalation, Daily - RT      Vitamin B-12 3000 MCG sublingual tablet   1 tablet, Sublingual, Daily             Discharge Diet:   Diet Instructions     Diet: Cardiac, Regular; Thin      Discharge Diet:   Cardiac  Regular       Fluid Consistency:  Thin          Activity at Discharge:   Activity Instructions     Activity as Tolerated            Follow-up Appointments:  Future Appointments   Date Time Provider Department Center   12/4/2019 10:15 AM Gerard Lake MD [facility] None     Additional Instructions for the Follow-ups that You Need to Schedule     Discharge Follow-up with PCP   As directed       Currently Documented PCP:    Gerard Lake MD    PCP Phone Number:    [phone]     Follow Up Details:  1 week         Referral to Home Health   As directed      Face to Face Visit Date:  9/15/2019    Follow-up provider for Plan of Care?:  I treated the patient in an acute care facility and will not continue treatment after discharge.    Follow-up provider:  GERARD LAKE [[id number]]    Reason/Clinical Findings:  generalized weakness, congestive heart failure    Describe mobility limitations that make leaving home difficult:  generalized weakness, congestive heart failure    Nursing/Therapeutic Services Requested:  Skilled Nursing Physical Therapy    Skilled nursing orders:  CHF management O2 instruction    PT orders:  Strengthening    Frequency:  1 Week 1               Radames Arce MD  09/15/19  3:33 PM    Time Spent on Discharge Activities: Greater than 30 minutes.

## 2019-09-16 NOTE — TELEPHONE ENCOUNTER
ELIO FROM [facility] [phone] CALLED AND PT WAS IN HOSPITAL FOR CHF AND THEY WOULD LIKE NURSING AND PT. I GAVE THE VERBAL ORDER FOR THIS

## 2019-09-16 NOTE — OUTREACH NOTE
Prep Survey      Responses   Facility patient discharged from?  [place]   Is patient eligible?  Yes   Discharge diagnosis  **Heart failure, acute on chronic, systolic and diastolic    Does the patient have one of the following disease processes/diagnoses(primary or secondary)?  CHF   Does the patient have Home health ordered?  Yes   What is the Home health agency?   [facility]    Is there a DME ordered?  Yes   What DME was ordered?  [employer] for Oxygen    Medication alerts for this patient  Bumex    Prep survey completed?  Yes          Catina Torres RN

## 2019-09-17 NOTE — OUTREACH NOTE
CHF Week 1 Survey      Responses   Facility patient discharged from?  [place]   Does the patient have one of the following disease processes/diagnoses(primary or secondary)?  CHF   Is there a successful TCM telephone encounter documented?  No   CHF Week 1 attempt successful?  Yes   Call start time  0957   Rescheduled  Rescheduled-pt requested   Discharge diagnosis  **Heart failure, acute on chronic, systolic and diastolic    Is patient permission given to speak with other caregiver?  Yes   Person spoke with today (if not patient) and relationship  Marcellus Felix RN

## 2019-09-18 NOTE — TELEPHONE ENCOUNTER
SENT RX TO [facility] FOR PATIENT. CALLED AND LEFT MESSAGE FOR PT ADVISING THEM OF THIS.       ----- Message from Gerard Jama MD sent at 9/18/2019  4:26 PM EDT -----  This is more likely to be a urinary tract infection and I would start her on Septra double strength twice a day, 10 tablets      ----- Message -----  From: Loli Huston MA  Sent: 9/18/2019   3:11 PM  To: MD Dr. SAHIL Spence,    Please see below and advise. Thank you.     ----- Message -----  From: Alicja Chicasnifer  Sent: 9/18/2019   2:30 PM  To: Loli Huston MA      PT HAS BURNING WHEN URINATING AND SHE WAS JUST RELEASED FROM THE HOSPITAL AND WAS PRESCRIBED LASIX AND CARE GIVER WONDERS IF TI MAYBE FROM THAT. CAN SOMETHING BE CALLED IF FOR HER.    [facility]    [phone]

## 2019-09-20 NOTE — OUTREACH NOTE
Care Coordination Assessment    Documented/Reviewed By:  Merry Romero RN Date/time:  9/20/2019 12:34 PM   Assessment completed with:  family (Comment: Patient's daughter in law)  Enrolled in care management program:  Yes  Living arrangement:  alone  Support system:  children  Type of residence:  private residence  Home care services:  Yes (Comment: Has [facility] Care with start of care 8/27/19)  Equipment used at home:  walker, tub seat  Bed or wheelchair confined:  No  Inadequate nutrition:  No  Medication adherence problem:  No  Experiencing side effects from current medications:  No  Difficulty keeping appointments:  No  Family aware of the patient's advance care planning wishes:  Yes

## 2019-09-20 NOTE — OUTREACH NOTE
Care Plan Note      Responses   Lifestyle Goals  Routine follow-up with doctor(s), Medication management, Record weight daily   Barriers  Disease education   Self Management  Medication Adherence, Weight Monitoring   Annual Wellness Visit:   -- [Addressed with family and will discuss with PCP]   AWV Materials  Send Materials   Care Gaps Addressed  Flu Shot   Flu Shot Status  Up to Date   Flu Shot Completion at [facility] or Other  [facility]   Other Patient Education/Resources   Advanced Care Planning, MyChart   ACP Education Method  Verbal   MyChart Education Method  Verbal [Active]   Does patient have depression diagnosis?  No   Advanced Directives:  Patient Has   Ed Visits past 12 months:  2 or 3   Hospitalizations past 12 months  1   Discharged From:  [facility]   Discharged to:  Home   Admit Date:  09/11/19   Discharge Date:  09/15/19   Discharge destination:  Home   Medication Adherence  Medications understood [Per family]        The main concerns and/or symptoms the patient would like to address are: Talked with patient's family (DIL). Patient hospitalized from 9/11/19 to 9/15/19 due heart failure. Patient is receiving SN and PT home health services. Patient lives alone; has caregivers; supportive family;  receiving assistance with activities and  transportation. Patient ambulates with walker. Patient (with assistance) is compliant with medications; medical appointments and monitoring of daily weights. She reports patient has no difficulty with chest pain; SOB; appetite or sleeping.     Education/instruction provided by Care Coordinator: Reviewed with patient's family (DIL) home health services [place] contact information; Advance Directives;  My Chart; gaps in care; MWV and Care Advising program. Family verbalized understanding.No further questions or concerns voiced at this time.     Follow Up Outreach Due: Follow up as needed.     Merry Romero RN  Community Care Coordinator    9/20/2019, 12:43  PM

## 2019-09-20 NOTE — OUTREACH NOTE
Care Coordination Note  Talked with [facility] [phone]. She states patient is receiving SN, PT home health services with start of care 8/27/19 through 10/25/19.     Merry Romero RN  Community Care Coordinator    9/20/2019, 12:41 PM

## 2019-09-20 NOTE — PROGRESS NOTES
Subjective   Jazmine Brown is a 89 y.o. female. Patient is here today for follow-up from recent hospitalization for heart failure.  Patient was restarted on Bumex and diuresed and improved and was discharged.  She is feeling fine currently and is having no breathing problems at this point.  She denies any chest pain, shortness of breath, edema or myalgias.  She has cardiology appointment next week..  She does complain of some vaginal irritation and thinks she has a yeast infection  Chief Complaint   Patient presents with   • Congestive Heart Failure     SHORTNESS OF BREATH- PT HERE FOR HOSPITAL FOLLOW UP       (Not on file)-  Risk for Readmission (LACE) Score: 11 (9/15/2019  6:00 AM)           Vitals:    09/20/19 0840   BP: 120/68   Pulse: 78   Resp: 18   Temp: 97.5 °F (36.4 °C)   SpO2: 96%     The following portions of the patient's history were reviewed and updated as appropriate: allergies, current medications, past family history, past medical history, past social history, past surgical history and problem list.    Past Medical History:   Diagnosis Date   • A-fib (CMS/[facility])    • CHF (congestive heart failure) (CMS/[facility])    • Depression    • Hypertension    • Polyp, sigmoid colon    • Pulmonary hypertension (CMS/[facility])    • Thyroid cancer (CMS/[facility])       Allergies   Allergen Reactions   • Codeine Hallucinations   • Latex Rash      Social History     Socioeconomic History   • Marital status:      Spouse name: Not on file   • Number of children: 2   • Years of education: Not on file   • Highest education level: Not on file   Occupational History   • Occupation: Lunch Room Lady at [employer]     Employer: RETIRED   Tobacco Use   • Smoking status: Never Smoker   • Smokeless tobacco: Never Used   Substance and Sexual Activity   • Alcohol use: No   • Drug use: No   • Sexual activity: Defer        Current Outpatient Medications:   •  acetaminophen (TYLENOL) 500 MG tablet, Take 1,000 mg by mouth 2 (Two)  Times a Day As Needed for Mild Pain ., Disp: , Rfl:   •  albuterol (ACCUNEB) 1.25 MG/3ML nebulizer solution, Take 3 mL by nebulization Every 4 (Four) Hours As Needed for Wheezing., Disp: 90 vial, Rfl: 2  •  albuterol (PROAIR HFA) 108 (90 Base) MCG/ACT inhaler, Inhale 2 puffs Every 4 (Four) Hours As Needed for Wheezing or Shortness of Air., Disp: 18 g, Rfl: 5  •  bumetanide (BUMEX) 0.5 MG tablet, Take 1 tablet by mouth Daily., Disp: 30 tablet, Rfl: 0  •  carvedilol (COREG) 6.25 MG tablet, Take 6.25 mg by mouth 2 (Two) Times a Day With Meals., Disp: , Rfl:   •  Cyanocobalamin (VITAMIN B-12) 3000 MCG sublingual tablet, Place 1 tablet under the tongue Daily., Disp: , Rfl:   •  levothyroxine (SYNTHROID, LEVOTHROID) 75 MCG tablet, Take 75 mcg by mouth Daily., Disp: , Rfl:   •  lisinopril (PRINIVIL,ZESTRIL) 5 MG tablet, Take 1 tablet by mouth Daily., Disp: 90 tablet, Rfl: 1  •  multivitamins-minerals (PRESERVISION AREDS 2) capsule capsule, Take 1 capsule by mouth 2 (Two) Times a Day., Disp: , Rfl:   •  PARoxetine (PAXIL) 20 MG tablet, Take 1 tablet by mouth Daily., Disp: 90 tablet, Rfl: 1  •  potassium chloride (MICRO-K) 10 MEQ CR capsule, Take 1 capsule by mouth Daily., Disp: 30 capsule, Rfl: 0  •  umeclidinium-vilanterol (ANORO ELLIPTA) 62.5-25 MCG/INH aerosol powder  inhaler, Inhale 1 puff Daily., Disp: 1 each, Rfl: 10  •  vitamin D (ERGOCALCIFEROL) 99699 units capsule capsule, Take 1 capsule by mouth 1 (One) Time Per Week. (Patient taking differently: Take 50,000 Units by mouth 1 (One) Time Per Week. Wednesday), Disp: 12 capsule, Rfl: 0  •  fluconazole (DIFLUCAN) 150 MG tablet, Take 1 tablet by mouth 1 (One) Time for 1 dose., Disp: 1 tablet, Rfl: 0     Objective     History of Present Illness     Review of Systems   Constitutional: Negative.    HENT: Negative.    Eyes: Negative.    Respiratory: Negative.    Cardiovascular: Negative.    Gastrointestinal: Negative.    Genitourinary: Negative.    Musculoskeletal:  Negative.    Skin: Negative.    Neurological: Negative.    Psychiatric/Behavioral: Negative.        Physical Exam   Constitutional: She appears well-developed and well-nourished.   Pleasant, cooperative no acute distress, blood pressure 130/80   HENT:   Head: Normocephalic and atraumatic.   Eyes: Conjunctivae are normal. Pupils are equal, round, and reactive to light. No scleral icterus.   Neck: Normal range of motion. Neck supple. No JVD present.   Cardiovascular: Normal rate and normal heart sounds.   Pulmonary/Chest: Effort normal and breath sounds normal. No respiratory distress. She has no wheezes. She has no rales.   Musculoskeletal: Normal range of motion. She exhibits no edema.   Neurological: She is alert.   Skin: Skin is warm and dry.   Psychiatric: She has a normal mood and affect. Her behavior is normal.   Nursing note and vitals reviewed.      ASSESSMENT the patient's sitting comfortably having no respiratory distress with a normal pulse oximetry on room air.  Her heart and lungs sound quite clear.  She has no edema.  I reviewed her hospital records and most recent BMP was essentially normal.  She does have cardiology follow-up scheduled next week.  She also was having some probable monilia vaginitis     Problem List Items Addressed This Visit        Cardiovascular and Mediastinum    Atrial fibrillation (CMS/HCC)    Essential hypertension    Moderate to severe pulmonary hypertension (CMS/HCC) - Primary      Other Visit Diagnoses     Acute vaginitis              Current outpatient and discharge medications have been reconciled for the patient.  Reviewed by: Gerard Jama MD      PLAN the patient received a flu shot today.  I prescribed Diflucan 150 mg for the presumptive monilia vaginitis and I will see her back in a month with a CBC, CMP, BNP    There are no Patient Instructions on file for this visit.  Return in about 1 month (around 10/20/2019) for with labs.

## 2019-09-24 NOTE — PROGRESS NOTES
PATIENTINFORMATION    Date of Office Visit: 2019  Encounter Provider: Rachel Cummings MD  Place of Service: [facility] CARDIOLOGY  Patient Name: Jazmine Brown  : 1926    Subjective:     Encounter Date:2019      Patient ID: Jazmine Brown is a 89 y.o. female.      History of Present Illness    This is lady previously followed by [facility].  She has a history of permanent atrial fibrillation with complete heart block status post a pacemaker, which is a [employer].  She has a history of a nonischemic cardiomyopathy.  The last echocardiogram I see for her in the [facility] was in 2017 when her ejection fraction was noted to be 40% to 45%, moderate left atrial dilation, moderate right ventricular dilatation, severe right atrial dilation, mild to moderate mitral regurgitation, and moderate to severe tricuspid regurgitation with severe pulmonary hypertension.    She complains of feeling weak.  She does not have much stamina.  She falls frequently.  She denies chest pain.  She is short of breath with exertion.  She denies edema.  Sometimes she is more comfortable sleeping propped up.  She denies paroxysmal nocturnal dyspnea.  She has vertigo spells without exacerbating or relieving factors.    Review of Systems   Constitution: Positive for decreased appetite and malaise/fatigue. Negative for fever, weight gain and weight loss.   HENT: Negative for ear pain, hearing loss, nosebleeds and sore throat.    Eyes: Negative for double vision, pain, vision loss in left eye and vision loss in right eye.   Cardiovascular:        See history of present illness.   Respiratory: Positive for shortness of breath. Negative for cough, sleep disturbances due to breathing, snoring and wheezing.    Endocrine: Negative for cold intolerance, heat intolerance and polyuria.   Skin: Negative for itching, poor wound healing and rash.   Musculoskeletal: Negative for joint pain,  "joint swelling and myalgias.   Gastrointestinal: Negative for abdominal pain, diarrhea, hematochezia, nausea and vomiting.   Genitourinary: Negative for hematuria and hesitancy.   Neurological: Positive for dizziness and light-headedness. Negative for numbness, paresthesias and seizures.   Psychiatric/Behavioral: Positive for depression. The patient is not nervous/anxious.            ECG 12 Lead  Date/Time: 9/24/2019 3:17 PM  Performed by: Rachel Cummings MD  Authorized by: Rachel Cummings MD   Comparison: compared with previous ECG from 9/11/2019  Similar to previous ECG  Rhythm: atrial fibrillation  BPM: 75  Pacing: ventricular pacing  Clinical impression: abnormal EKG               Objective:     /70 (BP Location: Right arm, Patient Position: Sitting, Cuff Size: Adult)   Pulse 75   Resp 16   Ht 154.9 cm (61\")   Wt 46.7 kg (103 lb)   LMP  (LMP Unknown)   BMI 19.46 kg/m²  Body mass index is 19.46 kg/m².     Physical Exam   Constitutional: She appears well-developed.   HENT:   Head: Normocephalic and atraumatic.   Eyes: Conjunctivae and lids are normal. Pupils are equal, round, and reactive to light. Lids are everted and swept, no foreign bodies found.   Neck: Normal range of motion. No JVD present. Carotid bruit is not present. No tracheal deviation present. No thyroid mass present.   Cardiovascular: Normal rate, regular rhythm and normal heart sounds.   Pulses:       Dorsalis pedis pulses are 2+ on the right side, and 2+ on the left side.   Pulmonary/Chest: Effort normal and breath sounds normal.   Abdominal: Normal appearance and bowel sounds are normal.   Musculoskeletal: Normal range of motion.   Neurological: She is alert. She has normal strength.   Skin: Skin is warm, dry and intact.   Psychiatric: She has a normal mood and affect. Her behavior is normal.   Vitals reviewed.      Lab Review: I reviewed her recent labs from her hospitalization in September      Assessment/Plan:       1.  " Permanent atrial fibrillation.  2.  Complete heart block status post pacemaker.  She has declined offers to upgrade to biventricular device in the past per Dr. Childs office note.  3.  Nonischemic cardia myopathy with an ejection fraction of 35 to 40% by echo in September 2019.  4.  Moderate left atrial enlargement  5.  Severe right atrial enlargement  6.  Mild to moderate mitral regurgitation.  7.  Moderate to severe tricuspid regurgitation with severe pulmonary hypertension by echo in 2017.  Pulmonary hypertension was not noted on an echo in 2019.  8.  Hypertension  9.  Vertigo.  I will refer to ENT    I will see her back at the main office in 3 months with a pacemaker check    Orders Placed This Encounter   Procedures   • Ambulatory Referral to ENT (Otolaryngology)     Referral Priority:   Routine     Referral Type:   Consultation     Referral Reason:   Specialty Services Required     Requested Specialty:   Otolaryngology     Number of Visits Requested:   1   • ECG 12 Lead     This order was created via procedure documentation        Discharge Medications           Accurate as of 9/24/19  4:04 PM. If you have any questions, ask your nurse or doctor.               Changes to Medications      Instructions Start Date   vitamin D 41969 units capsule capsule  Commonly known as:  ERGOCALCIFEROL  What changed:  additional instructions   50,000 Units, Oral, Weekly         Continue These Medications      Instructions Start Date   acetaminophen 500 MG tablet  Commonly known as:  TYLENOL   1,000 mg, Oral, 2 Times Daily PRN      albuterol sulfate  (90 Base) MCG/ACT inhaler  Commonly known as:  PROAIR HFA   2 puffs, Inhalation, Every 4 Hours PRN      albuterol 1.25 MG/3ML nebulizer solution  Commonly known as:  ACCUNEB   1 ampule, Nebulization, Every 4 Hours PRN      bumetanide 0.5 MG tablet  Commonly known as:  BUMEX   0.5 mg, Oral, Daily      carvedilol 6.25 MG tablet  Commonly known as:  COREG   6.25 mg, Oral, 2  Times Daily With Meals      levothyroxine 75 MCG tablet  Commonly known as:  SYNTHROID, LEVOTHROID   75 mcg, Oral, Daily      lisinopril 5 MG tablet  Commonly known as:  PRINIVIL,ZESTRIL   5 mg, Oral, Daily      multivitamins-minerals capsule capsule   1 capsule, Oral, 2 Times Daily      PARoxetine 20 MG tablet  Commonly known as:  PAXIL   20 mg, Oral, Daily      potassium chloride 10 MEQ CR capsule  Commonly known as:  MICRO-K   10 mEq, Oral, Daily      umeclidinium-vilanterol 62.5-25 MCG/INH aerosol powder  inhaler  Commonly known as:  ANORO ELLIPTA   1 puff, Inhalation, Daily - RT      Vitamin B-12 3000 MCG sublingual tablet   1 tablet, Sublingual, Daily                    Rachel Cummings MD  09/24/19  4:04 PM

## 2019-10-05 NOTE — TELEPHONE ENCOUNTER
CALLED AND S/W PATIENT'S DAUGHTER IN LAW AND ADVISED WHAT DR. LAKE SAID. SHE VOICED UNDERSTANDING AND SAID THEY WOULD CALL ENT DOCTOR TO SET UP APPT.     ----- Message from Gerard Lake MD sent at 9/27/2019  3:46 PM EDT -----  It is fine to decrease the Coreg to once a day.  An ENT referral is fine with me and Dr. Will Dia is fine with me.      ----- Message -----  From: Loli Huston MA  Sent: 9/27/2019   1:31 PM  To: MD DR. SAHIL Spence,    PLEASE SEE BELOW AND ADVISE. THANK YOU.      ----- Message -----  From: Rachel Chicas  Sent: 9/27/2019   1:17 PM  To: Loli Huston MA    Can pt reduce her corage 6.25mg bid to once a day because she is dizzy and bp low, pulse ok.  Can dr Narayanan consider a referral dr Meredith ENT  [facility] ent [phone] to rule out r/o ear crystals that maybe causing dizziness.    Karla kemp RN with [facility] [phone] is the person who is requesting the above.    Thank you

## 2019-10-15 NOTE — PROGRESS NOTES
Subjective   Jazmine Brown is a 89 y.o. female. Patient is here today for follow-up on her history of pulmonary hypertension, heart failure, hypertension, and cardiomyopathy.  Patient's generally been stable.  She was having some dizziness that seems to been corrected by treatment with some Epley maneuvers.  She has no acute complaints.  She does have supplemental oxygen at home.  Chief Complaint   Patient presents with   • Atrial Fibrillation     HTN- PT HERE FOR FOLLOW UP           Vitals:    10/15/19 1254   BP: 120/68   Pulse: 107   Resp: 18   Temp: 97.5 °F (36.4 °C)   SpO2: 95%     The following portions of the patient's history were reviewed and updated as appropriate: allergies, current medications, past family history, past medical history, past social history, past surgical history and problem list.    Past Medical History:   Diagnosis Date   • A-fib (CMS/HCC)    • Cardiomyopathy (CMS/HCC)    • CHF (congestive heart failure) (CMS/HCC)    • Chronic fatigue    • Depression    • Dyspnea    • Hypertension    • Polyp, sigmoid colon    • Pulmonary hypertension (CMS/HCC)    • Thyroid cancer (CMS/HCC)       Allergies   Allergen Reactions   • Codeine Hallucinations   • Latex Rash      Social History     Socioeconomic History   • Marital status:      Spouse name: Not on file   • Number of children: 2   • Years of education: Not on file   • Highest education level: Not on file   Occupational History   • Occupation: Lunch Room Lady at [employer]     Employer: RETIRED   Tobacco Use   • Smoking status: Never Smoker   • Smokeless tobacco: Never Used   Substance and Sexual Activity   • Alcohol use: No   • Drug use: No   • Sexual activity: Defer        Current Outpatient Medications:   •  acetaminophen (TYLENOL) 500 MG tablet, Take 1,000 mg by mouth 2 (Two) Times a Day As Needed for Mild Pain ., Disp: , Rfl:   •  albuterol (ACCUNEB) 1.25 MG/3ML nebulizer solution, Take 3 mL by nebulization Every 4 (Four)  Hours As Needed for Wheezing., Disp: 90 vial, Rfl: 2  •  albuterol (PROAIR HFA) 108 (90 Base) MCG/ACT inhaler, Inhale 2 puffs Every 4 (Four) Hours As Needed for Wheezing or Shortness of Air., Disp: 18 g, Rfl: 5  •  bumetanide (BUMEX) 0.5 MG tablet, Take 1 tablet by mouth Daily., Disp: 30 tablet, Rfl: 2  •  carvedilol (COREG) 6.25 MG tablet, Take 6.25 mg by mouth 2 (Two) Times a Day With Meals., Disp: , Rfl:   •  Cyanocobalamin (VITAMIN B-12) 3000 MCG sublingual tablet, Place 1 tablet under the tongue Daily., Disp: , Rfl:   •  levothyroxine (SYNTHROID, LEVOTHROID) 75 MCG tablet, Take 75 mcg by mouth Daily., Disp: , Rfl:   •  lisinopril (PRINIVIL,ZESTRIL) 5 MG tablet, Take 1 tablet by mouth Daily., Disp: 90 tablet, Rfl: 1  •  multivitamins-minerals (PRESERVISION AREDS 2) capsule capsule, Take 1 capsule by mouth 2 (Two) Times a Day., Disp: , Rfl:   •  PARoxetine (PAXIL) 20 MG tablet, TAKE 1 TABLET BY MOUTH ONCE DAILY, Disp: 90 tablet, Rfl: 1  •  potassium chloride (MICRO-K) 10 MEQ CR capsule, Take 1 capsule by mouth Daily., Disp: 30 capsule, Rfl: 2  •  umeclidinium-vilanterol (ANORO ELLIPTA) 62.5-25 MCG/INH aerosol powder  inhaler, Inhale 1 puff Daily., Disp: 1 each, Rfl: 10  •  vitamin D (ERGOCALCIFEROL) 80314 units capsule capsule, Take 1 capsule by mouth 1 (One) Time Per Week. (Patient taking differently: Take 50,000 Units by mouth 1 (One) Time Per Week. Wednesday), Disp: 12 capsule, Rfl: 0     Objective     History of Present Illness     Review of Systems   Constitutional: Negative.    HENT: Negative.    Eyes: Negative.    Respiratory: Negative.    Cardiovascular: Negative.    Gastrointestinal: Negative.    Genitourinary: Negative.    Musculoskeletal: Negative.    Skin: Negative.    Neurological: Negative.    Psychiatric/Behavioral: Negative.        Physical Exam   Constitutional: She appears well-developed and well-nourished.   Pleasant, cooperative no acute distress, blood pressure 90/70   HENT:   Head:  Normocephalic and atraumatic.   Eyes: Conjunctivae are normal. Pupils are equal, round, and reactive to light. No scleral icterus.   Neck: Normal range of motion. Neck supple.   Cardiovascular: Normal rate, regular rhythm and normal heart sounds.   Pulmonary/Chest: Effort normal and breath sounds normal. No respiratory distress. She has no wheezes. She has no rales.   Musculoskeletal: Normal range of motion. She exhibits no edema.   Neurological: She is alert.   Skin: Skin is warm and dry.   Psychiatric: She has a normal mood and affect. Her behavior is normal.   Nursing note and vitals reviewed.      ASSESSMENT overall patient stable.  She is cheerful and in no distress currently.  Her lungs sound clear.  Heart sounds regular and she has no dependent edema.  Overall stable with hypertension well controlled.  History of heart failure and pulmonary hypertension, generally asymptomatic currently     Problem List Items Addressed This Visit        Cardiovascular and Mediastinum    Heart failure, acute on chronic, systolic and diastolic (CMS/HCC) - Primary    Relevant Orders    CBC & Differential    Comprehensive Metabolic Panel    BNP    Essential hypertension    Relevant Orders    CBC & Differential    Comprehensive Metabolic Panel    Moderate to severe pulmonary hypertension (CMS/HCC)       Respiratory    Chronic respiratory failure with hypoxia (CMS/HCC)    Relevant Orders    CBC & Differential          PLAN I have ordered a CBC, CMP and BNP to be drawn today.  I will have the patient back and in about 4 months with laboratory studies to be drawn at the visit.    There are no Patient Instructions on file for this visit.  Return in about 4 months (around 2/15/2020).

## 2019-10-16 NOTE — TELEPHONE ENCOUNTER
Test have been added and faxed to [facility].     ----- Message from Gerard Jama MD sent at 10/16/2019  8:46 AM EDT -----  See if we can add a serum iron and TIBC, B12 and folic acid levels because of her anemia       ----- Message -----  From: Interface, Reflab Results In  Sent: 10/16/2019   8:14 AM  To: Gerard Jama MD

## 2019-10-25 NOTE — OUTREACH NOTE
Patient Outreach Note  Talked with patient's daughter in law (Milagros). She states patient has completed home health services as of today. Patient has home caregivers 6 days a week that stay with patient from 11AM to 7PM. She states caregivers assist with meal preparation; personal care and exercise. She states patient is wearing Life Alert; ambulates with rollator and has been seeing ENT regarding patient's dizziness. Milagros states patient's dizziness has improved and walking with a more steady gait. Patient is compliant with medications; appointments; daily weight and has had no difficulty with chest pain; SOB; appetite or sleeping. Reviewed with patient's daughter in law education regarding CHF; daily weight;  24/7 Nurse Line Telephone number; [place] contact information; Advance Directives (completed): My Chart (Active); gaps in care(flu and pneumonia vaccine completed/ [employer]); and MWV(addressed and will discuss with PCP). Milagros  verbalized understanding. Patient is well managed with health care and good support from family. Daughter states to appreciate phone calls. No further questions or concerns voiced at this time.     Merry Romero RN  Community Care Coordinator    10/25/2019, 5:07 PM

## 2019-11-15 NOTE — TELEPHONE ENCOUNTER
CALLED AND S/W BERNARD AND ADVISED WHAT DR. LAKE SAID. HE SAID HE WOULD START CHECKING BP WHEN SHE GETS DIZZY SPELLS.     ----- Message from Gerard Lake MD sent at 11/13/2019 11:38 AM EST -----  Contact: BERNARD   Unless her blood pressure is getting low, cannot really see any association between any of the medicines in the spells.  They might try and check a blood pressure if she is having 1 of the spells to see if it is okay or not.      ----- Message -----  From: Loli Huston MA  Sent: 11/13/2019  10:25 AM  To: MD DR. SAHIL Spence,    PLEASE SEE BELOW AND ADVISE. THANK YOU.      ----- Message -----  From: Cash Cabral  Sent: 11/12/2019   4:15 PM  To: Loli Huston MA    carvedilol (COREG) 6.25 MG tablet [[id number]]      SON CALLED AND STATED DR LAKE REDUCE 2 PILLS TO 1 PILL. NOW HAVING DIZZY SPELLS AND IN THE LAST TWO DAY HAD THREE DIZZY SPELLS EACH DAY AND LASTING 8-10 MINS A PIECE. QUESTIONS IS IS THERE  A RELATIONSHIPS BETWEEN MEDS AND DIZZY SPELLS. PLEASE  CALL [phone]    MORIS CATES

## 2019-11-25 PROBLEM — I27.81 CHRONIC COR PULMONALE (HCC): Status: ACTIVE | Noted: 2017-09-14

## 2019-11-25 PROBLEM — Z66 DNR (DO NOT RESUSCITATE): Status: ACTIVE | Noted: 2017-10-27

## 2019-11-25 NOTE — PROGRESS NOTES
"Lpue Brown is a 89 y.o.. female.     Urinary Frequency    This is a new problem. The current episode started yesterday. The problem has been unchanged. There has been no fever. Associated symptoms include frequency. Pertinent negatives include no flank pain, hematuria, nausea, urgency or vomiting.       The following portions of the patient's history were reviewed and updated as appropriate: allergies, current medications, past family history, past medical history, past social history, past surgical history and problem list.    Past Medical History:   Diagnosis Date   • A-fib (CMS/HCC)    • Cardiomyopathy (CMS/HCC)    • CHF (congestive heart failure) (CMS/HCC)    • Chronic fatigue    • Depression    • Dyspnea    • Hypertension    • Polyp, sigmoid colon    • Pulmonary hypertension (CMS/HCC)    • Thyroid cancer (CMS/HCC)        Past Surgical History:   Procedure Laterality Date   • CARDIAC PACEMAKER PLACEMENT     • HYSTERECTOMY     • PACEMAKER IMPLANTATION     • TONSILLECTOMY AND ADENOIDECTOMY     • TOTAL THYROIDECTOMY         Review of Systems   Constitutional: Negative for fever.   Gastrointestinal: Negative for nausea and vomiting.   Genitourinary: Positive for dysuria and frequency. Negative for flank pain, hematuria and urgency.       Allergies   Allergen Reactions   • Codeine Hallucinations   • Smz-Tmp Ds [Sulfamethoxazole-Trimethoprim] Hallucinations   • Latex Rash       Objective     Vitals:    11/25/19 1350   BP: 122/68   Pulse: 71   Resp: 16   Temp: 98 °F (36.7 °C)   TempSrc: Oral   SpO2: 97%   Weight: 50.1 kg (110 lb 6.4 oz)   Height: 154.9 cm (60.98\")     Body mass index is 20.87 kg/m².    Physical Exam   Constitutional: She is oriented to person, place, and time. She appears well-developed and well-nourished.   HENT:   Head: Normocephalic and atraumatic.   Eyes: Pupils are equal, round, and reactive to light.   Cardiovascular: Normal rate and regular rhythm.   Pulmonary/Chest: Effort " normal and breath sounds normal.   Musculoskeletal:   In wheel chair   Neurological: She is alert and oriented to person, place, and time.   Vitals reviewed.        Current Outpatient Medications:   •  acetaminophen (TYLENOL) 500 MG tablet, Take 1,000 mg by mouth 2 (Two) Times a Day As Needed for Mild Pain ., Disp: , Rfl:   •  albuterol (ACCUNEB) 1.25 MG/3ML nebulizer solution, Take 3 mL by nebulization Every 4 (Four) Hours As Needed for Wheezing., Disp: 90 vial, Rfl: 2  •  albuterol (PROAIR HFA) 108 (90 Base) MCG/ACT inhaler, Inhale 2 puffs Every 4 (Four) Hours As Needed for Wheezing or Shortness of Air., Disp: 18 g, Rfl: 5  •  bumetanide (BUMEX) 0.5 MG tablet, Take 1 tablet by mouth Daily., Disp: 30 tablet, Rfl: 2  •  carvedilol (COREG) 6.25 MG tablet, Take 6.25 mg by mouth 2 (Two) Times a Day With Meals., Disp: , Rfl:   •  Cyanocobalamin (VITAMIN B-12) 3000 MCG sublingual tablet, Place 1 tablet under the tongue Daily., Disp: , Rfl:   •  levothyroxine (SYNTHROID, LEVOTHROID) 75 MCG tablet, Take 75 mcg by mouth Daily., Disp: , Rfl:   •  levothyroxine (SYNTHROID, LEVOTHROID) 75 MCG tablet, TAKE 1 TABLET BY MOUTH ONCE DAILY, Disp: 90 tablet, Rfl: 1  •  lisinopril (PRINIVIL,ZESTRIL) 5 MG tablet, Take 1 tablet by mouth Daily., Disp: 90 tablet, Rfl: 1  •  multivitamins-minerals (PRESERVISION AREDS 2) capsule capsule, Take 1 capsule by mouth 2 (Two) Times a Day., Disp: , Rfl:   •  PARoxetine (PAXIL) 20 MG tablet, TAKE 1 TABLET BY MOUTH ONCE DAILY, Disp: 90 tablet, Rfl: 1  •  potassium chloride (MICRO-K) 10 MEQ CR capsule, Take 1 capsule by mouth Daily., Disp: 30 capsule, Rfl: 2  •  umeclidinium-vilanterol (ANORO ELLIPTA) 62.5-25 MCG/INH aerosol powder  inhaler, Inhale 1 puff Daily., Disp: 1 each, Rfl: 10  •  vitamin D (ERGOCALCIFEROL) 63167 units capsule capsule, Take 1 capsule by mouth 1 (One) Time Per Week. (Patient taking differently: Take 50,000 Units by mouth 1 (One) Time Per Week. Wednesday), Disp: 12 capsule, Rfl:  0  •  amoxicillin-clavulanate (AUGMENTIN) 500-125 MG per tablet, Take 1 tablet by mouth 2 (Two) Times a Day for 7 days., Disp: 14 tablet, Rfl: 0    Lab Results (last 24 hours)     Procedure Component Value Units Date/Time    POC Urinalysis Dipstick, Automated [[id number]]  (Abnormal) Collected:  11/25/19 1353    Specimen:  Urine Updated:  11/25/19 1353     Color Yellow     Clarity, UA Cloudy     Specific Gravity  1.010     pH, Urine 6.5     Leukocytes Small (1+)     Nitrite, UA Negative     Protein, POC Negative mg/dL      Glucose, UA Negative mg/dL      Ketones, UA Negative     Urobilinogen, UA Normal     Bilirubin Negative     Blood, UA Trace          Assessment/Plan   Jazmine was seen today for urinary frequency.    Diagnoses and all orders for this visit:    Urine frequency  -     POC Urinalysis Dipstick, Automated    Cystitis with hematuria  -     Urine Culture - Urine, Urine, Clean Catch  -     amoxicillin-clavulanate (AUGMENTIN) 500-125 MG per tablet; Take 1 tablet by mouth 2 (Two) Times a Day for 7 days.        Patient Instructions   Drink plenty of fluids      Return if symptoms worsen or fail to improve.

## 2019-12-04 NOTE — PROGRESS NOTES
Subjective   Jazmine Brown is a 89 y.o. female. Patient is here today for follow-up on her hypertension, history of heart failure, atrial fibrillation with pacemaker and hypothyroidism.  Patient had a recent bladder infection that resolved on antibiotics.  She is generally been stable and has no acute complaints today.  She does use oxygen at night while sleeping  Chief Complaint   Patient presents with   • Hypertension   • Congestive Heart Failure          Vitals:    12/04/19 1030   Pulse: 79   Temp: 97.4 °F (36.3 °C)   SpO2: 94%     Body mass index is 21.2 kg/m².  The following portions of the patient's history were reviewed and updated as appropriate: allergies, current medications, past family history, past medical history, past social history, past surgical history and problem list.    Past Medical History:   Diagnosis Date   • A-fib (CMS/HCC)    • Cardiomyopathy (CMS/HCC)    • CHF (congestive heart failure) (CMS/HCC)    • Chronic fatigue    • Depression    • Dyspnea    • Hypertension    • Polyp, sigmoid colon    • Pulmonary hypertension (CMS/HCC)    • Thyroid cancer (CMS/HCC)       Allergies   Allergen Reactions   • Codeine Hallucinations   • Smz-Tmp Ds [Sulfamethoxazole-Trimethoprim] Hallucinations   • Latex Rash      Social History     Socioeconomic History   • Marital status:      Spouse name: Not on file   • Number of children: 2   • Years of education: Not on file   • Highest education level: Not on file   Occupational History   • Occupation: Lunch Room Lady at [employer]     Employer: RETIRED   Tobacco Use   • Smoking status: Never Smoker   • Smokeless tobacco: Never Used   Substance and Sexual Activity   • Alcohol use: No   • Drug use: No   • Sexual activity: Defer        Current Outpatient Medications:   •  acetaminophen (TYLENOL) 500 MG tablet, Take 1,000 mg by mouth 2 (Two) Times a Day As Needed for Mild Pain ., Disp: , Rfl:   •  albuterol (ACCUNEB) 1.25 MG/3ML nebulizer solution,  Take 3 mL by nebulization Every 4 (Four) Hours As Needed for Wheezing., Disp: 90 vial, Rfl: 2  •  albuterol (PROAIR HFA) 108 (90 Base) MCG/ACT inhaler, Inhale 2 puffs Every 4 (Four) Hours As Needed for Wheezing or Shortness of Air., Disp: 18 g, Rfl: 5  •  bumetanide (BUMEX) 0.5 MG tablet, Take 1 tablet by mouth Daily., Disp: 30 tablet, Rfl: 2  •  carvedilol (COREG) 6.25 MG tablet, Take 6.25 mg by mouth 2 (Two) Times a Day With Meals., Disp: , Rfl:   •  Cyanocobalamin (VITAMIN B-12) 3000 MCG sublingual tablet, Place 1 tablet under the tongue Daily., Disp: , Rfl:   •  levothyroxine (SYNTHROID, LEVOTHROID) 75 MCG tablet, Take 75 mcg by mouth Daily., Disp: , Rfl:   •  levothyroxine (SYNTHROID, LEVOTHROID) 75 MCG tablet, TAKE 1 TABLET BY MOUTH ONCE DAILY, Disp: 90 tablet, Rfl: 1  •  lisinopril (PRINIVIL,ZESTRIL) 5 MG tablet, Take 1 tablet by mouth Daily., Disp: 90 tablet, Rfl: 1  •  multivitamins-minerals (PRESERVISION AREDS 2) capsule capsule, Take 1 capsule by mouth 2 (Two) Times a Day., Disp: , Rfl:   •  PARoxetine (PAXIL) 20 MG tablet, TAKE 1 TABLET BY MOUTH ONCE DAILY, Disp: 90 tablet, Rfl: 1  •  potassium chloride (MICRO-K) 10 MEQ CR capsule, Take 1 capsule by mouth Daily., Disp: 30 capsule, Rfl: 2  •  umeclidinium-vilanterol (ANORO ELLIPTA) 62.5-25 MCG/INH aerosol powder  inhaler, Inhale 1 puff Daily., Disp: 1 each, Rfl: 10  •  vitamin D (ERGOCALCIFEROL) 23544 units capsule capsule, Take 1 capsule by mouth 1 (One) Time Per Week. (Patient taking differently: Take 50,000 Units by mouth 1 (One) Time Per Week. Wednesday), Disp: 12 capsule, Rfl: 0     Objective     History of Present Illness     Review of Systems   Constitutional: Negative.    HENT: Negative.    Eyes: Negative.    Respiratory: Negative.    Cardiovascular: Negative.    Gastrointestinal: Negative.    Genitourinary: Negative.    Musculoskeletal: Negative.    Skin: Negative.    Neurological: Negative.    Psychiatric/Behavioral: Negative.        Physical  Exam   Constitutional: She appears well-developed and well-nourished.   Pleasant, cooperative in no distress, alert and very pleasant and talkative, blood pressure 110/70   HENT:   Head: Normocephalic and atraumatic.   Eyes: Conjunctivae are normal. Pupils are equal, round, and reactive to light. No scleral icterus.   Neck: Normal range of motion. Neck supple.   Cardiovascular: Normal rate, regular rhythm and normal heart sounds.   Pulmonary/Chest: Effort normal and breath sounds normal. No respiratory distress. She has no wheezes. She has no rales.   Musculoskeletal: Normal range of motion. She exhibits no edema.   Neurological: She is alert.   Skin: Skin is warm and dry.   Psychiatric: She has a normal mood and affect. Her behavior is normal.   Nursing note and vitals reviewed.      ASSESSMENT overall the patient seems stable.  There are no recent laboratory studies to review.  Her heart sounds regular with pacemaker and her lungs sound clear and there is no peripheral edema.  She is having no urinary symptoms.  #1-hypertension controlled  #2-chronic atrial fibrillation with pacemaker, not on anticoagulation because of fall risk  #3-hypothyroidism, clinically euthyroid  #4-recent urinary tract infection, resolved based on symptoms     Problem List Items Addressed This Visit        Cardiovascular and Mediastinum    Atrial fibrillation (CMS/HCC)    Essential hypertension - Primary       Endocrine    Hypothyroidism       Genitourinary    RESOLVED: Acute cystitis without hematuria       Other    Chronic fatigue          PLAN the patient will continue current medicines as now.  Plan on rechecking her in about 5 months with a CBC, CMP, lipid panel, TSH and free T4    There are no Patient Instructions on file for this visit.  No Follow-up on file.

## 2019-12-27 NOTE — PROGRESS NOTES
PATIENTINFORMATION    Date of Office Visit: 19  Encounter Provider: Rachel Cummings MD  Place of Service: [facility] CARDIOLOGY  Patient Name: Jazmine Brown  : 1926    Subjective:         Patient ID: Jazmine Brown is a 89 y.o. female.      History of Present Illness    This is lady previously followed by [facility].  She has a history of permanent atrial fibrillation with complete heart block status post a pacemaker, which is a [employer].  She has a history of a nonischemic cardiomyopathy.  The last echocardiogram I see for her in the [facility] was in 2017 when her ejection fraction was noted to be 40% to 45%, moderate left atrial dilation, moderate right ventricular dilatation, severe right atrial dilation, mild to moderate mitral regurgitation, and moderate to severe tricuspid regurgitation with severe pulmonary hypertension.    Overall, she has been stable. She has had a couple trips to the ear, nose, and throat doctor and thinks her vertigo is better. She denies any chest pain. She has an occasional palpitation. She is short of breath with exertion and uses oxygen at home but did not bring any with her today. She has been having a headache but has also stopped drinking caffeine. Her pacemaker was interrogated today and she is 100% ventricularly paced but otherwise thinks look normal.      Review of Systems   Constitution: Negative for fever, malaise/fatigue, weight gain and weight loss.   HENT: Negative for ear pain, hearing loss, nosebleeds and sore throat.    Eyes: Negative for double vision, pain, vision loss in left eye and vision loss in right eye.   Cardiovascular:        See history of present illness.   Respiratory: Positive for shortness of breath and wheezing. Negative for cough, sleep disturbances due to breathing and snoring.    Endocrine: Negative for cold intolerance, heat intolerance and polyuria.   Skin: Negative for itching,  "poor wound healing and rash.   Musculoskeletal: Negative for joint pain, joint swelling and myalgias.   Gastrointestinal: Negative for abdominal pain, diarrhea, hematochezia, nausea and vomiting.   Genitourinary: Negative for hematuria and hesitancy.   Neurological: Positive for dizziness. Negative for numbness, paresthesias and seizures.   Psychiatric/Behavioral: Negative for depression. The patient is not nervous/anxious.            ECG 12 Lead  Date/Time: 12/27/2019 1:05 PM  Performed by: Rachel Cummings MD  Authorized by: Rachel Cummings MD   Comparison: compared with previous ECG from 9/24/2019  Rhythm: sinus rhythm  BPM: 70  Pacing: ventricular pacing             Objective:     /60 (BP Location: Left arm)   Pulse 91   Ht 154.9 cm (61\")   Wt 51.7 kg (114 lb)   LMP  (LMP Unknown)   SpO2 96%   BMI 21.54 kg/m²  Body mass index is 21.54 kg/m².     Physical Exam   Constitutional: She appears well-developed.   HENT:   Head: Normocephalic and atraumatic.   Eyes: Pupils are equal, round, and reactive to light. Conjunctivae and lids are normal. Lids are everted and swept, no foreign bodies found.   Neck: Normal range of motion. No JVD present. Carotid bruit is not present. No tracheal deviation present. No thyroid mass present.   Cardiovascular: Normal rate, regular rhythm and normal heart sounds.   Pulses:       Dorsalis pedis pulses are 2+ on the right side, and 2+ on the left side.   Pulmonary/Chest: Effort normal and breath sounds normal.   Abdominal: Normal appearance and bowel sounds are normal.   Musculoskeletal: Normal range of motion.   Neurological: She is alert. She has normal strength.   Skin: Skin is warm, dry and intact.   Psychiatric: She has a normal mood and affect. Her behavior is normal.   Vitals reviewed.          Assessment/Plan:       1.  Permanent atrial fibrillation. Currently in NSR  2.  Complete heart block status post pacemaker.  She has declined offers to upgrade to " biventricular device in the past per Dr. Childs office note.  3.  Nonischemic cardia myopathy with an ejection fraction of 35 to 40% by echo in September 2019.  4.  Moderate left atrial enlargement  5.  Severe right atrial enlargement  6.  Mild to moderate mitral regurgitation.  7.  Moderate to severe tricuspid regurgitation with severe pulmonary hypertension by echo in 2017.  Pulmonary hypertension was not noted on an echo in 2019.  8.  Hypertension  9.  Vertigo.  I will refer to ENT    Stable. No changes. Follow up with NP in 6 months with a PPM check.      Orders Placed This Encounter   Procedures   • ECG 12 Lead     This order was created via procedure documentation        Discharge Medications           Accurate as of December 27, 2019  1:06 PM. If you have any questions, ask your nurse or doctor.               Changes to Medications      Instructions Start Date   vitamin D 1.25 MG (82800 UT) capsule capsule  Commonly known as:  ERGOCALCIFEROL  What changed:  additional instructions   50,000 Units, Oral, Weekly         Continue These Medications      Instructions Start Date   acetaminophen 500 MG tablet  Commonly known as:  TYLENOL   1,000 mg, Oral, 2 Times Daily PRN      albuterol sulfate  (90 Base) MCG/ACT inhaler  Commonly known as:  PROAIR HFA   2 puffs, Inhalation, Every 4 Hours PRN      albuterol 1.25 MG/3ML nebulizer solution  Commonly known as:  ACCUNEB   1.25 mg, Nebulization, Every 4 Hours PRN      carvedilol 6.25 MG tablet  Commonly known as:  COREG   TAKE 1 TABLET BY MOUTH TWICE DAILY WITH MEALS      levothyroxine 75 MCG tablet  Commonly known as:  SYNTHROID, LEVOTHROID   TAKE 1 TABLET BY MOUTH ONCE DAILY      lisinopril 5 MG tablet  Commonly known as:  PRINIVIL,ZESTRIL   5 mg, Oral, Daily      multivitamins-minerals capsule capsule   1 capsule, Oral, 2 Times Daily      PARoxetine 20 MG tablet  Commonly known as:  PAXIL   TAKE 1 TABLET BY MOUTH ONCE DAILY      umeclidinium-vilanterol 62.5-25  MCG/INH aerosol powder  inhaler  Commonly known as:  ANORO ELLIPTA   1 puff, Inhalation, Daily - RT      Vitamin B-12 3000 MCG sublingual tablet   1 tablet, Sublingual, Daily         Stop These Medications    bumetanide 0.5 MG tablet  Commonly known as:  BUMEX  Stopped by:  Rachel Cummings MD     potassium chloride 10 MEQ CR capsule  Commonly known as:  MICRO-K  Stopped by:  MD Rachel Mendoza MD  12/27/19  1:06 PM

## 2020-01-01 ENCOUNTER — OFFICE VISIT (OUTPATIENT)
Dept: FAMILY MEDICINE CLINIC | Facility: CLINIC | Age: 85
End: 2020-01-01

## 2020-01-01 ENCOUNTER — PATIENT OUTREACH (OUTPATIENT)
Dept: CASE MANAGEMENT | Facility: OTHER | Age: 85
End: 2020-01-01

## 2020-01-01 ENCOUNTER — EPISODE CHANGES (OUTPATIENT)
Dept: CASE MANAGEMENT | Facility: OTHER | Age: 85
End: 2020-01-01

## 2020-01-01 ENCOUNTER — HOSPITAL ENCOUNTER (EMERGENCY)
Facility: HOSPITAL | Age: 85
Discharge: HOME | End: 2020-03-03
Attending: EMERGENCY MEDICINE | Admitting: EMERGENCY MEDICINE

## 2020-01-01 ENCOUNTER — APPOINTMENT (OUTPATIENT)
Dept: GENERAL RADIOLOGY | Facility: HOSPITAL | Age: 85
End: 2020-01-01

## 2020-01-01 ENCOUNTER — TELEPHONE (OUTPATIENT)
Dept: FAMILY MEDICINE CLINIC | Facility: CLINIC | Age: 85
End: 2020-01-01

## 2020-01-01 ENCOUNTER — HOSPITAL ENCOUNTER (INPATIENT)
Facility: HOSPITAL | Age: 85
LOS: 3 days | Discharge: NURSING, REHAB OR LONG-TERM CARE | End: 2020-01-31
Attending: EMERGENCY MEDICINE | Admitting: INTERNAL MEDICINE

## 2020-01-01 ENCOUNTER — APPOINTMENT (OUTPATIENT)
Dept: CT IMAGING | Facility: HOSPITAL | Age: 85
End: 2020-01-01

## 2020-01-01 ENCOUNTER — APPOINTMENT (OUTPATIENT)
Dept: CARDIOLOGY | Facility: HOSPITAL | Age: 85
End: 2020-01-01

## 2020-01-01 VITALS
RESPIRATION RATE: 20 BRPM | HEART RATE: 81 BPM | WEIGHT: 110.3 LBS | BODY MASS INDEX: 21.65 KG/M2 | SYSTOLIC BLOOD PRESSURE: 124 MMHG | OXYGEN SATURATION: 97 % | DIASTOLIC BLOOD PRESSURE: 57 MMHG | TEMPERATURE: 98.2 F | HEIGHT: 60 IN

## 2020-01-01 VITALS
SYSTOLIC BLOOD PRESSURE: 151 MMHG | TEMPERATURE: 98.7 F | RESPIRATION RATE: 16 BRPM | OXYGEN SATURATION: 97 % | HEART RATE: 79 BPM | DIASTOLIC BLOOD PRESSURE: 69 MMHG

## 2020-01-01 VITALS
RESPIRATION RATE: 18 BRPM | SYSTOLIC BLOOD PRESSURE: 123 MMHG | OXYGEN SATURATION: 92 % | HEART RATE: 77 BPM | DIASTOLIC BLOOD PRESSURE: 64 MMHG | TEMPERATURE: 97.4 F | HEIGHT: 60 IN | BODY MASS INDEX: 21.95 KG/M2

## 2020-01-01 VITALS
OXYGEN SATURATION: 100 % | RESPIRATION RATE: 18 BRPM | TEMPERATURE: 96.6 F | HEIGHT: 60 IN | HEART RATE: 75 BPM | DIASTOLIC BLOOD PRESSURE: 67 MMHG | WEIGHT: 112.4 LBS | SYSTOLIC BLOOD PRESSURE: 147 MMHG | BODY MASS INDEX: 22.07 KG/M2

## 2020-01-01 DIAGNOSIS — I50.43 ACUTE ON CHRONIC COMBINED SYSTOLIC AND DIASTOLIC HEART FAILURE (HCC): ICD-10-CM

## 2020-01-01 DIAGNOSIS — R06.00 DYSPNEA, UNSPECIFIED TYPE: Primary | ICD-10-CM

## 2020-01-01 DIAGNOSIS — L03.115 CELLULITIS OF RIGHT FOOT: ICD-10-CM

## 2020-01-01 DIAGNOSIS — M10.9 ACUTE GOUT OF RIGHT FOOT, UNSPECIFIED CAUSE: ICD-10-CM

## 2020-01-01 DIAGNOSIS — I10 ESSENTIAL HYPERTENSION: ICD-10-CM

## 2020-01-01 DIAGNOSIS — M10.471 ACUTE GOUT DUE TO OTHER SECONDARY CAUSE INVOLVING TOE OF RIGHT FOOT: Primary | ICD-10-CM

## 2020-01-01 DIAGNOSIS — M21.611 BUNION, RIGHT: ICD-10-CM

## 2020-01-01 DIAGNOSIS — S90.821A BLISTER OF RIGHT FOOT, INITIAL ENCOUNTER: ICD-10-CM

## 2020-01-01 DIAGNOSIS — I27.20 MODERATE TO SEVERE PULMONARY HYPERTENSION (HCC): Primary | ICD-10-CM

## 2020-01-01 DIAGNOSIS — M79.671 FOOT PAIN, RIGHT: Primary | ICD-10-CM

## 2020-01-01 DIAGNOSIS — I27.81 CHRONIC COR PULMONALE (HCC): ICD-10-CM

## 2020-01-01 DIAGNOSIS — R06.82 TACHYPNEA: ICD-10-CM

## 2020-01-01 LAB
ALBUMIN SERPL-MCNC: 4 G/DL (ref 3.5–5.2)
ALBUMIN/GLOB SERPL: 1.7 G/DL
ALP SERPL-CCNC: 78 U/L (ref 39–117)
ALT SERPL W P-5'-P-CCNC: 12 U/L (ref 1–33)
ANION GAP SERPL CALCULATED.3IONS-SCNC: 11.4 MMOL/L (ref 5–15)
ANION GAP SERPL CALCULATED.3IONS-SCNC: 12 MMOL/L (ref 5–15)
ANION GAP SERPL CALCULATED.3IONS-SCNC: 12.2 MMOL/L (ref 5–15)
ANION GAP SERPL CALCULATED.3IONS-SCNC: 9.1 MMOL/L (ref 5–15)
ANISOCYTOSIS BLD QL: ABNORMAL
AORTIC DIMENSIONLESS INDEX: 0.7 (DI)
ARTERIAL PATENCY WRIST A: POSITIVE
AST SERPL-CCNC: 22 U/L (ref 1–32)
ATMOSPHERIC PRESS: 748.9 MMHG
B PARAPERT DNA SPEC QL NAA+PROBE: NOT DETECTED
B PERT DNA SPEC QL NAA+PROBE: NOT DETECTED
BACTERIA SPEC AEROBE CULT: NORMAL
BACTERIA SPEC AEROBE CULT: NORMAL
BASE EXCESS BLDA CALC-SCNC: 2.3 MMOL/L (ref 0–2)
BASOPHILS # BLD AUTO: 0.03 10*3/MM3 (ref 0–0.2)
BASOPHILS # BLD AUTO: 0.2 X10E3/UL (ref 0–0.2)
BASOPHILS NFR BLD AUTO: 0.3 % (ref 0–1.5)
BASOPHILS NFR BLD AUTO: 2 %
BDY SITE: ABNORMAL
BH CV ECHO MEAS - ACS: 1.6 CM
BH CV ECHO MEAS - AI DEC SLOPE: 99.7 CM/SEC^2
BH CV ECHO MEAS - AI MAX PG: 32.9 MMHG
BH CV ECHO MEAS - AI MAX VEL: 287 CM/SEC
BH CV ECHO MEAS - AI P1/2T: 843.1 MSEC
BH CV ECHO MEAS - AO MAX PG (FULL): 5.7 MMHG
BH CV ECHO MEAS - AO MAX PG: 9.7 MMHG
BH CV ECHO MEAS - AO MEAN PG (FULL): 3 MMHG
BH CV ECHO MEAS - AO MEAN PG: 5 MMHG
BH CV ECHO MEAS - AO ROOT AREA (BSA CORRECTED): 1.9
BH CV ECHO MEAS - AO ROOT AREA: 6.2 CM^2
BH CV ECHO MEAS - AO ROOT DIAM: 2.8 CM
BH CV ECHO MEAS - AO V2 MAX: 156 CM/SEC
BH CV ECHO MEAS - AO V2 MEAN: 100 CM/SEC
BH CV ECHO MEAS - AO V2 VTI: 28.9 CM
BH CV ECHO MEAS - AVA(I,A): 1.7 CM^2
BH CV ECHO MEAS - AVA(I,D): 1.7 CM^2
BH CV ECHO MEAS - AVA(V,A): 1.6 CM^2
BH CV ECHO MEAS - AVA(V,D): 1.6 CM^2
BH CV ECHO MEAS - BSA(HAYCOCK): 1.5 M^2
BH CV ECHO MEAS - BSA: 1.5 M^2
BH CV ECHO MEAS - BZI_BMI: 23.6 KILOGRAMS/M^2
BH CV ECHO MEAS - BZI_METRIC_HEIGHT: 152.4 CM
BH CV ECHO MEAS - BZI_METRIC_WEIGHT: 54.9 KG
BH CV ECHO MEAS - EDV(CUBED): 91.1 ML
BH CV ECHO MEAS - EDV(MOD-SP2): 71 ML
BH CV ECHO MEAS - EDV(MOD-SP4): 62 ML
BH CV ECHO MEAS - EDV(TEICH): 92.4 ML
BH CV ECHO MEAS - EF(CUBED): 64 %
BH CV ECHO MEAS - EF(MOD-BP): 57 %
BH CV ECHO MEAS - EF(MOD-SP2): 62 %
BH CV ECHO MEAS - EF(MOD-SP4): 53.2 %
BH CV ECHO MEAS - EF(TEICH): 55.7 %
BH CV ECHO MEAS - ESV(CUBED): 32.8 ML
BH CV ECHO MEAS - ESV(MOD-SP2): 27 ML
BH CV ECHO MEAS - ESV(MOD-SP4): 29 ML
BH CV ECHO MEAS - ESV(TEICH): 41 ML
BH CV ECHO MEAS - FS: 28.9 %
BH CV ECHO MEAS - IVS/LVPW: 1.3
BH CV ECHO MEAS - IVSD: 1.2 CM
BH CV ECHO MEAS - LAT PEAK E' VEL: 8 CM/SEC
BH CV ECHO MEAS - LV DIASTOLIC VOL/BSA (35-75): 41.1 ML/M^2
BH CV ECHO MEAS - LV MASS(C)D: 164 GRAMS
BH CV ECHO MEAS - LV MASS(C)DI: 108.8 GRAMS/M^2
BH CV ECHO MEAS - LV MAX PG: 4.1 MMHG
BH CV ECHO MEAS - LV MEAN PG: 2 MMHG
BH CV ECHO MEAS - LV SYSTOLIC VOL/BSA (12-30): 19.2 ML/M^2
BH CV ECHO MEAS - LV V1 MAX: 101 CM/SEC
BH CV ECHO MEAS - LV V1 MEAN: 67.7 CM/SEC
BH CV ECHO MEAS - LV V1 VTI: 19.3 CM
BH CV ECHO MEAS - LVIDD: 4.5 CM
BH CV ECHO MEAS - LVIDS: 3.2 CM
BH CV ECHO MEAS - LVLD AP2: 7.2 CM
BH CV ECHO MEAS - LVLD AP4: 7.4 CM
BH CV ECHO MEAS - LVLS AP2: 5.7 CM
BH CV ECHO MEAS - LVLS AP4: 6.3 CM
BH CV ECHO MEAS - LVOT AREA (M): 2.5 CM^2
BH CV ECHO MEAS - LVOT AREA: 2.5 CM^2
BH CV ECHO MEAS - LVOT DIAM: 1.8 CM
BH CV ECHO MEAS - LVPWD: 0.9 CM
BH CV ECHO MEAS - MED PEAK E' VEL: 7 CM/SEC
BH CV ECHO MEAS - MR MAX PG: 80.3 MMHG
BH CV ECHO MEAS - MR MAX VEL: 448 CM/SEC
BH CV ECHO MEAS - MR MEAN PG: 55 MMHG
BH CV ECHO MEAS - MR MEAN VEL: 352 CM/SEC
BH CV ECHO MEAS - MR VTI: 152 CM
BH CV ECHO MEAS - MV A DUR: 0.12 SEC
BH CV ECHO MEAS - MV A MAX VEL: 52.9 CM/SEC
BH CV ECHO MEAS - MV DEC SLOPE: 738 CM/SEC^2
BH CV ECHO MEAS - MV DEC TIME: 220 SEC
BH CV ECHO MEAS - MV E MAX VEL: 145 CM/SEC
BH CV ECHO MEAS - MV E/A: 2.7
BH CV ECHO MEAS - MV MAX PG: 11.3 MMHG
BH CV ECHO MEAS - MV MEAN PG: 4 MMHG
BH CV ECHO MEAS - MV P1/2T MAX VEL: 169 CM/SEC
BH CV ECHO MEAS - MV P1/2T: 67.1 MSEC
BH CV ECHO MEAS - MV V2 MAX: 168 CM/SEC
BH CV ECHO MEAS - MV V2 MEAN: 85.7 CM/SEC
BH CV ECHO MEAS - MV V2 VTI: 32.5 CM
BH CV ECHO MEAS - MVA P1/2T LCG: 1.3 CM^2
BH CV ECHO MEAS - MVA(P1/2T): 3.3 CM^2
BH CV ECHO MEAS - MVA(VTI): 1.5 CM^2
BH CV ECHO MEAS - PA ACC TIME: 0.11 SEC
BH CV ECHO MEAS - PA MAX PG (FULL): 0.77 MMHG
BH CV ECHO MEAS - PA MAX PG: 1.9 MMHG
BH CV ECHO MEAS - PA PR(ACCEL): 31.3 MMHG
BH CV ECHO MEAS - PA V2 MAX: 68.4 CM/SEC
BH CV ECHO MEAS - PVA(V,A): 4.4 CM^2
BH CV ECHO MEAS - PVA(V,D): 4.4 CM^2
BH CV ECHO MEAS - QP/QS: 1
BH CV ECHO MEAS - RAP SYSTOLE: 15 MMHG
BH CV ECHO MEAS - RV MAX PG: 1.1 MMHG
BH CV ECHO MEAS - RV MEAN PG: 0 MMHG
BH CV ECHO MEAS - RV V1 MAX: 52.5 CM/SEC
BH CV ECHO MEAS - RV V1 MEAN: 29.8 CM/SEC
BH CV ECHO MEAS - RV V1 VTI: 8.6 CM
BH CV ECHO MEAS - RVOT AREA: 5.7 CM^2
BH CV ECHO MEAS - RVOT DIAM: 2.7 CM
BH CV ECHO MEAS - RVSP: 57.8 MMHG
BH CV ECHO MEAS - SI(AO): 118 ML/M^2
BH CV ECHO MEAS - SI(CUBED): 38.7 ML/M^2
BH CV ECHO MEAS - SI(LVOT): 32.6 ML/M^2
BH CV ECHO MEAS - SI(MOD-SP2): 29.2 ML/M^2
BH CV ECHO MEAS - SI(MOD-SP4): 21.9 ML/M^2
BH CV ECHO MEAS - SI(TEICH): 34.1 ML/M^2
BH CV ECHO MEAS - SV(AO): 178 ML
BH CV ECHO MEAS - SV(CUBED): 58.4 ML
BH CV ECHO MEAS - SV(LVOT): 49.1 ML
BH CV ECHO MEAS - SV(MOD-SP2): 44 ML
BH CV ECHO MEAS - SV(MOD-SP4): 33 ML
BH CV ECHO MEAS - SV(RVOT): 49.1 ML
BH CV ECHO MEAS - SV(TEICH): 51.5 ML
BH CV ECHO MEAS - TAPSE (>1.6): 2.6 CM2
BH CV ECHO MEAS - TR MAX VEL: 327 CM/SEC
BH CV ECHO MEASUREMENTS AVERAGE E/E' RATIO: 19.33
BH CV VAS BP RIGHT ARM: NORMAL MMHG
BH CV XLRA - RV BASE: 3.4 CM
BH CV XLRA - TDI S': 13 CM/SEC
BILIRUB SERPL-MCNC: 0.3 MG/DL (ref 0.2–1.2)
BUN BLD-MCNC: 14 MG/DL (ref 8–23)
BUN BLD-MCNC: 18 MG/DL (ref 8–23)
BUN BLD-MCNC: 19 MG/DL (ref 8–23)
BUN BLD-MCNC: 26 MG/DL (ref 8–23)
BUN/CREAT SERPL: 18.7 (ref 7–25)
BUN/CREAT SERPL: 22.1 (ref 7–25)
BUN/CREAT SERPL: 23.1 (ref 7–25)
BUN/CREAT SERPL: 26.5 (ref 7–25)
C PNEUM DNA NPH QL NAA+NON-PROBE: NOT DETECTED
CALCIUM SPEC-SCNC: 8.2 MG/DL (ref 8.2–9.6)
CALCIUM SPEC-SCNC: 8.3 MG/DL (ref 8.2–9.6)
CALCIUM SPEC-SCNC: 8.3 MG/DL (ref 8.2–9.6)
CALCIUM SPEC-SCNC: 8.8 MG/DL (ref 8.2–9.6)
CHLORIDE SERPL-SCNC: 101 MMOL/L (ref 98–107)
CHLORIDE SERPL-SCNC: 101 MMOL/L (ref 98–107)
CHLORIDE SERPL-SCNC: 102 MMOL/L (ref 98–107)
CHLORIDE SERPL-SCNC: 97 MMOL/L (ref 98–107)
CO2 SERPL-SCNC: 27.6 MMOL/L (ref 22–29)
CO2 SERPL-SCNC: 29 MMOL/L (ref 22–29)
CO2 SERPL-SCNC: 31.9 MMOL/L (ref 22–29)
CO2 SERPL-SCNC: 32.8 MMOL/L (ref 22–29)
CREAT BLD-MCNC: 0.75 MG/DL (ref 0.57–1)
CREAT BLD-MCNC: 0.78 MG/DL (ref 0.57–1)
CREAT BLD-MCNC: 0.86 MG/DL (ref 0.57–1)
CREAT BLD-MCNC: 0.98 MG/DL (ref 0.57–1)
CRP SERPL-MCNC: 13 MG/L (ref 0–10)
D-LACTATE SERPL-SCNC: 0.9 MMOL/L (ref 0.5–2)
DEPRECATED RDW RBC AUTO: 55.3 FL (ref 37–54)
DEPRECATED RDW RBC AUTO: 57.5 FL (ref 37–54)
DEPRECATED RDW RBC AUTO: 59.9 FL (ref 37–54)
EOSINOPHIL # BLD AUTO: 0 X10E3/UL (ref 0–0.4)
EOSINOPHIL # BLD AUTO: 0.01 10*3/MM3 (ref 0–0.4)
EOSINOPHIL NFR BLD AUTO: 0 %
EOSINOPHIL NFR BLD AUTO: 0.1 % (ref 0.3–6.2)
ERYTHROCYTE [DISTWIDTH] IN BLOOD BY AUTOMATED COUNT: 14.4 % (ref 12.3–15.4)
ERYTHROCYTE [DISTWIDTH] IN BLOOD BY AUTOMATED COUNT: 15 % (ref 12.3–15.4)
ERYTHROCYTE [DISTWIDTH] IN BLOOD BY AUTOMATED COUNT: 15.1 % (ref 12.3–15.4)
ERYTHROCYTE [DISTWIDTH] IN BLOOD BY AUTOMATED COUNT: 17 % (ref 11.7–15.4)
ERYTHROCYTE [SEDIMENTATION RATE] IN BLOOD BY WESTERGREN METHOD: 16 MM/HR (ref 0–40)
FLUAV H1 2009 PAND RNA NPH QL NAA+PROBE: NOT DETECTED
FLUAV H1 HA GENE NPH QL NAA+PROBE: NOT DETECTED
FLUAV H3 RNA NPH QL NAA+PROBE: NOT DETECTED
FLUAV SUBTYP SPEC NAA+PROBE: NOT DETECTED
FLUBV RNA ISLT QL NAA+PROBE: NOT DETECTED
FOLATE SERPL-MCNC: 5.47 NG/ML (ref 4.78–24.2)
GAS FLOW AIRWAY: 2 LPM
GFR SERPL CREATININE-BSD FRML MDRD: 53 ML/MIN/1.73
GFR SERPL CREATININE-BSD FRML MDRD: 62 ML/MIN/1.73
GFR SERPL CREATININE-BSD FRML MDRD: 69 ML/MIN/1.73
GFR SERPL CREATININE-BSD FRML MDRD: 72 ML/MIN/1.73
GLOBULIN UR ELPH-MCNC: 2.3 GM/DL
GLUCOSE BLD-MCNC: 135 MG/DL (ref 65–99)
GLUCOSE BLD-MCNC: 80 MG/DL (ref 65–99)
GLUCOSE BLD-MCNC: 83 MG/DL (ref 65–99)
GLUCOSE BLD-MCNC: 91 MG/DL (ref 65–99)
HADV DNA SPEC NAA+PROBE: NOT DETECTED
HCO3 BLDA-SCNC: 28.8 MMOL/L (ref 22–28)
HCOV 229E RNA SPEC QL NAA+PROBE: NOT DETECTED
HCOV HKU1 RNA SPEC QL NAA+PROBE: NOT DETECTED
HCOV NL63 RNA SPEC QL NAA+PROBE: NOT DETECTED
HCOV OC43 RNA SPEC QL NAA+PROBE: NOT DETECTED
HCT VFR BLD AUTO: 25.9 % (ref 34–46.6)
HCT VFR BLD AUTO: 29.5 % (ref 34–46.6)
HCT VFR BLD AUTO: 30.6 % (ref 34–46.6)
HCT VFR BLD AUTO: 31.9 % (ref 34–46.6)
HGB BLD-MCNC: 10.1 G/DL (ref 12–15.9)
HGB BLD-MCNC: 10.4 G/DL (ref 12–15.9)
HGB BLD-MCNC: 8.2 G/DL (ref 11.1–15.9)
HGB BLD-MCNC: 9.4 G/DL (ref 12–15.9)
HMPV RNA NPH QL NAA+NON-PROBE: NOT DETECTED
HPIV1 RNA SPEC QL NAA+PROBE: NOT DETECTED
HPIV2 RNA SPEC QL NAA+PROBE: NOT DETECTED
HPIV3 RNA NPH QL NAA+PROBE: NOT DETECTED
HPIV4 P GENE NPH QL NAA+PROBE: NOT DETECTED
IMM GRANULOCYTES # BLD AUTO: 0.06 10*3/MM3 (ref 0–0.05)
IMM GRANULOCYTES NFR BLD AUTO: 0.6 % (ref 0–0.5)
INR PPP: 1.26 (ref 0.9–1.1)
LEFT ATRIUM VOLUME INDEX: 62 ML/M2
LV EF 2D ECHO EST: 45 %
LYMPHOCYTES # BLD AUTO: 0.3 X10E3/UL (ref 0.7–3.1)
LYMPHOCYTES # BLD AUTO: 0.91 10*3/MM3 (ref 0.7–3.1)
LYMPHOCYTES # BLD MANUAL: 0.38 10*3/MM3 (ref 0.7–3.1)
LYMPHOCYTES NFR BLD AUTO: 3 %
LYMPHOCYTES NFR BLD AUTO: 9.3 % (ref 19.6–45.3)
LYMPHOCYTES NFR BLD MANUAL: 1 % (ref 5–12)
LYMPHOCYTES NFR BLD MANUAL: 5.1 % (ref 19.6–45.3)
M PNEUMO IGG SER IA-ACNC: NOT DETECTED
MACROCYTES BLD QL SMEAR: ABNORMAL
MAGNESIUM SERPL-MCNC: 2 MG/DL (ref 1.7–2.3)
MAXIMAL PREDICTED HEART RATE: 127 BPM
MCH RBC QN AUTO: 34.2 PG (ref 26.6–33)
MCH RBC QN AUTO: 34.2 PG (ref 26.6–33)
MCH RBC QN AUTO: 35.3 PG (ref 26.6–33)
MCH RBC QN AUTO: 35.7 PG (ref 26.6–33)
MCHC RBC AUTO-ENTMCNC: 31.7 G/DL (ref 31.5–35.7)
MCHC RBC AUTO-ENTMCNC: 31.9 G/DL (ref 31.5–35.7)
MCHC RBC AUTO-ENTMCNC: 32.6 G/DL (ref 31.5–35.7)
MCHC RBC AUTO-ENTMCNC: 33 G/DL (ref 31.5–35.7)
MCV RBC AUTO: 107 FL (ref 79–97)
MCV RBC AUTO: 107.3 FL (ref 79–97)
MCV RBC AUTO: 108 FL (ref 79–97)
MCV RBC AUTO: 109.6 FL (ref 79–97)
MODALITY: ABNORMAL
MONOCYTES # BLD AUTO: 0.07 10*3/MM3 (ref 0.1–0.9)
MONOCYTES # BLD AUTO: 0.3 X10E3/UL (ref 0.1–0.9)
MONOCYTES # BLD AUTO: 0.57 10*3/MM3 (ref 0.1–0.9)
MONOCYTES NFR BLD AUTO: 3 %
MONOCYTES NFR BLD AUTO: 5.8 % (ref 5–12)
MORPHOLOGY BLD-IMP: ABNORMAL
NEUTROPHILS # BLD AUTO: 10.4 X10E3/UL (ref 1.4–7)
NEUTROPHILS # BLD AUTO: 6.94 10*3/MM3 (ref 1.7–7)
NEUTROPHILS # BLD AUTO: 8.19 10*3/MM3 (ref 1.7–7)
NEUTROPHILS NFR BLD AUTO: 83.9 % (ref 42.7–76)
NEUTROPHILS NFR BLD AUTO: 92 %
NEUTROPHILS NFR BLD MANUAL: 93.9 % (ref 42.7–76)
NRBC BLD AUTO-RTO: 0.1 /100 WBC (ref 0–0.2)
NT-PROBNP SERPL-MCNC: 1245 PG/ML (ref 5–1800)
PCO2 BLDA: 52.6 MM HG (ref 35–45)
PH BLDA: 7.35 PH UNITS (ref 7.35–7.45)
PHOSPHATE SERPL-MCNC: 3 MG/DL (ref 2.5–4.5)
PLAT MORPH BLD: NORMAL
PLATELET # BLD AUTO: 323 X10E3/UL (ref 150–450)
PLATELET # BLD AUTO: 343 10*3/MM3 (ref 140–450)
PLATELET # BLD AUTO: 360 10*3/MM3 (ref 140–450)
PLATELET # BLD AUTO: 384 10*3/MM3 (ref 140–450)
PMV BLD AUTO: 11 FL (ref 6–12)
PMV BLD AUTO: 11.1 FL (ref 6–12)
PMV BLD AUTO: 11.8 FL (ref 6–12)
PO2 BLDA: 60.9 MM HG (ref 80–100)
POLYCHROMASIA BLD QL SMEAR: ABNORMAL
POTASSIUM BLD-SCNC: 4 MMOL/L (ref 3.5–5.2)
POTASSIUM BLD-SCNC: 4.6 MMOL/L (ref 3.5–5.2)
POTASSIUM BLD-SCNC: 4.9 MMOL/L (ref 3.5–5.2)
POTASSIUM BLD-SCNC: 5.1 MMOL/L (ref 3.5–5.2)
PROCALCITONIN SERPL-MCNC: 0.06 NG/ML (ref 0.1–0.25)
PROT SERPL-MCNC: 6.3 G/DL (ref 6–8.5)
PROTHROMBIN TIME: 15.5 SECONDS (ref 11.7–14.2)
RBC # BLD AUTO: 2.4 X10E6/UL (ref 3.77–5.28)
RBC # BLD AUTO: 2.75 10*6/MM3 (ref 3.77–5.28)
RBC # BLD AUTO: 2.86 10*6/MM3 (ref 3.77–5.28)
RBC # BLD AUTO: 2.91 10*6/MM3 (ref 3.77–5.28)
RHEUMATOID FACT SERPL-ACNC: <10 IU/ML (ref 0–13.9)
RHINOVIRUS RNA SPEC NAA+PROBE: NOT DETECTED
RSV RNA NPH QL NAA+NON-PROBE: NOT DETECTED
SAO2 % BLDCOA: 89.1 % (ref 92–99)
SODIUM BLD-SCNC: 141 MMOL/L (ref 136–145)
SODIUM BLD-SCNC: 142 MMOL/L (ref 136–145)
STRESS TARGET HR: 108 BPM
TOTAL RATE: 18 BREATHS/MINUTE
TROPONIN T SERPL-MCNC: 0.01 NG/ML (ref 0–0.03)
URATE SERPL-MCNC: 5.3 MG/DL (ref 2.4–5.7)
URATE SERPL-MCNC: 6 MG/DL (ref 2.5–7.1)
VIT B12 BLD-MCNC: >2000 PG/ML (ref 211–946)
WBC # BLD AUTO: 11.3 X10E3/UL (ref 3.4–10.8)
WBC MORPH BLD: NORMAL
WBC NRBC COR # BLD: 7.39 10*3/MM3 (ref 3.4–10.8)
WBC NRBC COR # BLD: 9.27 10*3/MM3 (ref 3.4–10.8)
WBC NRBC COR # BLD: 9.77 10*3/MM3 (ref 3.4–10.8)

## 2020-01-01 PROCEDURE — 84484 ASSAY OF TROPONIN QUANT: CPT | Performed by: NURSE PRACTITIONER

## 2020-01-01 PROCEDURE — 97535 SELF CARE MNGMENT TRAINING: CPT

## 2020-01-01 PROCEDURE — 80053 COMPREHEN METABOLIC PANEL: CPT | Performed by: NURSE PRACTITIONER

## 2020-01-01 PROCEDURE — 71046 X-RAY EXAM CHEST 2 VIEWS: CPT

## 2020-01-01 PROCEDURE — 94640 AIRWAY INHALATION TREATMENT: CPT

## 2020-01-01 PROCEDURE — G0378 HOSPITAL OBSERVATION PER HR: HCPCS

## 2020-01-01 PROCEDURE — 94799 UNLISTED PULMONARY SVC/PX: CPT

## 2020-01-01 PROCEDURE — 80048 BASIC METABOLIC PNL TOTAL CA: CPT | Performed by: NURSE PRACTITIONER

## 2020-01-01 PROCEDURE — 82803 BLOOD GASES ANY COMBINATION: CPT

## 2020-01-01 PROCEDURE — 84100 ASSAY OF PHOSPHORUS: CPT | Performed by: INTERNAL MEDICINE

## 2020-01-01 PROCEDURE — 25010000002 METHYLPREDNISOLONE PER 125 MG: Performed by: NURSE PRACTITIONER

## 2020-01-01 PROCEDURE — 85025 COMPLETE CBC W/AUTO DIFF WBC: CPT | Performed by: NURSE PRACTITIONER

## 2020-01-01 PROCEDURE — 85025 COMPLETE CBC W/AUTO DIFF WBC: CPT | Performed by: EMERGENCY MEDICINE

## 2020-01-01 PROCEDURE — 97162 PT EVAL MOD COMPLEX 30 MIN: CPT

## 2020-01-01 PROCEDURE — 97116 GAIT TRAINING THERAPY: CPT

## 2020-01-01 PROCEDURE — 99232 SBSQ HOSP IP/OBS MODERATE 35: CPT | Performed by: INTERNAL MEDICINE

## 2020-01-01 PROCEDURE — 25010000002 CEFTRIAXONE PER 250 MG: Performed by: EMERGENCY MEDICINE

## 2020-01-01 PROCEDURE — 83605 ASSAY OF LACTIC ACID: CPT | Performed by: NURSE PRACTITIONER

## 2020-01-01 PROCEDURE — 83880 ASSAY OF NATRIURETIC PEPTIDE: CPT | Performed by: NURSE PRACTITIONER

## 2020-01-01 PROCEDURE — 99283 EMERGENCY DEPT VISIT LOW MDM: CPT

## 2020-01-01 PROCEDURE — 83735 ASSAY OF MAGNESIUM: CPT | Performed by: INTERNAL MEDICINE

## 2020-01-01 PROCEDURE — 85027 COMPLETE CBC AUTOMATED: CPT | Performed by: NURSE PRACTITIONER

## 2020-01-01 PROCEDURE — 80048 BASIC METABOLIC PNL TOTAL CA: CPT | Performed by: INTERNAL MEDICINE

## 2020-01-01 PROCEDURE — 97530 THERAPEUTIC ACTIVITIES: CPT

## 2020-01-01 PROCEDURE — 87040 BLOOD CULTURE FOR BACTERIA: CPT | Performed by: NURSE PRACTITIONER

## 2020-01-01 PROCEDURE — 93306 TTE W/DOPPLER COMPLETE: CPT

## 2020-01-01 PROCEDURE — 25010000002 FUROSEMIDE PER 20 MG: Performed by: NURSE PRACTITIONER

## 2020-01-01 PROCEDURE — 82607 VITAMIN B-12: CPT | Performed by: HOSPITALIST

## 2020-01-01 PROCEDURE — 85007 BL SMEAR W/DIFF WBC COUNT: CPT | Performed by: NURSE PRACTITIONER

## 2020-01-01 PROCEDURE — 73630 X-RAY EXAM OF FOOT: CPT

## 2020-01-01 PROCEDURE — 99285 EMERGENCY DEPT VISIT HI MDM: CPT

## 2020-01-01 PROCEDURE — 93010 ELECTROCARDIOGRAM REPORT: CPT | Performed by: INTERNAL MEDICINE

## 2020-01-01 PROCEDURE — 76000 FLUOROSCOPY <1 HR PHYS/QHP: CPT

## 2020-01-01 PROCEDURE — 97110 THERAPEUTIC EXERCISES: CPT

## 2020-01-01 PROCEDURE — 0100U HC BIOFIRE FILMARRAY RESP PANEL 2: CPT | Performed by: NURSE PRACTITIONER

## 2020-01-01 PROCEDURE — 85610 PROTHROMBIN TIME: CPT | Performed by: NURSE PRACTITIONER

## 2020-01-01 PROCEDURE — 71275 CT ANGIOGRAPHY CHEST: CPT

## 2020-01-01 PROCEDURE — 99214 OFFICE O/P EST MOD 30 MIN: CPT | Performed by: INTERNAL MEDICINE

## 2020-01-01 PROCEDURE — 99231 SBSQ HOSP IP/OBS SF/LOW 25: CPT | Performed by: NURSE PRACTITIONER

## 2020-01-01 PROCEDURE — 97166 OT EVAL MOD COMPLEX 45 MIN: CPT

## 2020-01-01 PROCEDURE — 93005 ELECTROCARDIOGRAM TRACING: CPT | Performed by: NURSE PRACTITIONER

## 2020-01-01 PROCEDURE — 96365 THER/PROPH/DIAG IV INF INIT: CPT

## 2020-01-01 PROCEDURE — 97110 THERAPEUTIC EXERCISES: CPT | Performed by: PHYSICAL THERAPIST

## 2020-01-01 PROCEDURE — 84550 ASSAY OF BLOOD/URIC ACID: CPT | Performed by: EMERGENCY MEDICINE

## 2020-01-01 PROCEDURE — 82746 ASSAY OF FOLIC ACID SERUM: CPT | Performed by: HOSPITALIST

## 2020-01-01 PROCEDURE — 93306 TTE W/DOPPLER COMPLETE: CPT | Performed by: INTERNAL MEDICINE

## 2020-01-01 PROCEDURE — 36600 WITHDRAWAL OF ARTERIAL BLOOD: CPT

## 2020-01-01 PROCEDURE — 84145 PROCALCITONIN (PCT): CPT | Performed by: NURSE PRACTITIONER

## 2020-01-01 PROCEDURE — 25010000002 IOPAMIDOL 61 % SOLUTION: Performed by: EMERGENCY MEDICINE

## 2020-01-01 PROCEDURE — 99214 OFFICE O/P EST MOD 30 MIN: CPT | Performed by: NURSE PRACTITIONER

## 2020-01-01 RX ORDER — ONDANSETRON 2 MG/ML
4 INJECTION INTRAMUSCULAR; INTRAVENOUS EVERY 6 HOURS PRN
Status: DISCONTINUED | OUTPATIENT
Start: 2020-01-01 | End: 2020-01-01 | Stop reason: HOSPADM

## 2020-01-01 RX ORDER — FUROSEMIDE 10 MG/ML
20 INJECTION INTRAMUSCULAR; INTRAVENOUS ONCE
Status: COMPLETED | OUTPATIENT
Start: 2020-01-01 | End: 2020-01-01

## 2020-01-01 RX ORDER — ACETAMINOPHEN 650 MG/1
650 SUPPOSITORY RECTAL EVERY 4 HOURS PRN
Status: DISCONTINUED | OUTPATIENT
Start: 2020-01-01 | End: 2020-01-01 | Stop reason: HOSPADM

## 2020-01-01 RX ORDER — PAROXETINE HYDROCHLORIDE 20 MG/1
20 TABLET, FILM COATED ORAL DAILY
Status: DISCONTINUED | OUTPATIENT
Start: 2020-01-01 | End: 2020-01-01 | Stop reason: HOSPADM

## 2020-01-01 RX ORDER — IPRATROPIUM BROMIDE AND ALBUTEROL SULFATE 2.5; .5 MG/3ML; MG/3ML
3 SOLUTION RESPIRATORY (INHALATION) EVERY 4 HOURS PRN
Qty: 360 ML
Start: 2020-01-01 | End: 2020-01-01

## 2020-01-01 RX ORDER — CALCIUM CARBONATE 200(500)MG
2 TABLET,CHEWABLE ORAL 2 TIMES DAILY PRN
Status: DISCONTINUED | OUTPATIENT
Start: 2020-01-01 | End: 2020-01-01 | Stop reason: HOSPADM

## 2020-01-01 RX ORDER — IPRATROPIUM BROMIDE AND ALBUTEROL SULFATE 2.5; .5 MG/3ML; MG/3ML
3 SOLUTION RESPIRATORY (INHALATION)
Qty: 360 ML | Refills: 11
Start: 2020-01-01 | End: 2020-01-01 | Stop reason: SDUPTHER

## 2020-01-01 RX ORDER — ERGOCALCIFEROL 1.25 MG/1
CAPSULE ORAL
Qty: 12 CAPSULE | Refills: 0 | Status: SHIPPED | OUTPATIENT
Start: 2020-01-01

## 2020-01-01 RX ORDER — FUROSEMIDE 20 MG/1
20 TABLET ORAL DAILY
Status: DISCONTINUED | OUTPATIENT
Start: 2020-01-01 | End: 2020-01-01 | Stop reason: HOSPADM

## 2020-01-01 RX ORDER — IPRATROPIUM BROMIDE AND ALBUTEROL SULFATE 2.5; .5 MG/3ML; MG/3ML
3 SOLUTION RESPIRATORY (INHALATION)
Status: DISCONTINUED | OUTPATIENT
Start: 2020-01-01 | End: 2020-01-01

## 2020-01-01 RX ORDER — LANOLIN ALCOHOL/MO/W.PET/CERES
3 CREAM (GRAM) TOPICAL NIGHTLY PRN
Qty: 30 TABLET
Start: 2020-01-01

## 2020-01-01 RX ORDER — LEVOTHYROXINE SODIUM 0.07 MG/1
75 TABLET ORAL DAILY
Qty: 90 TABLET | Refills: 0 | Status: SHIPPED | OUTPATIENT
Start: 2020-01-01

## 2020-01-01 RX ORDER — UREA 10 %
3 LOTION (ML) TOPICAL NIGHTLY PRN
Status: DISCONTINUED | OUTPATIENT
Start: 2020-01-01 | End: 2020-01-01 | Stop reason: HOSPADM

## 2020-01-01 RX ORDER — ACETAMINOPHEN 500 MG
1000 TABLET ORAL ONCE
Status: COMPLETED | OUTPATIENT
Start: 2020-01-01 | End: 2020-01-01

## 2020-01-01 RX ORDER — CALCIUM CARBONATE 200(500)MG
2 TABLET,CHEWABLE ORAL 2 TIMES DAILY PRN
Start: 2020-01-01

## 2020-01-01 RX ORDER — SODIUM CHLORIDE 0.9 % (FLUSH) 0.9 %
10 SYRINGE (ML) INJECTION AS NEEDED
Status: DISCONTINUED | OUTPATIENT
Start: 2020-01-01 | End: 2020-01-01 | Stop reason: HOSPADM

## 2020-01-01 RX ORDER — BUMETANIDE 0.5 MG/1
TABLET ORAL
Qty: 30 TABLET | Refills: 0 | OUTPATIENT
Start: 2020-01-01

## 2020-01-01 RX ORDER — LEVOTHYROXINE SODIUM 0.07 MG/1
75 TABLET ORAL
Status: DISCONTINUED | OUTPATIENT
Start: 2020-01-01 | End: 2020-01-01 | Stop reason: HOSPADM

## 2020-01-01 RX ORDER — METHYLPREDNISOLONE SODIUM SUCCINATE 125 MG/2ML
60 INJECTION, POWDER, LYOPHILIZED, FOR SOLUTION INTRAMUSCULAR; INTRAVENOUS EVERY 6 HOURS
Status: DISCONTINUED | OUTPATIENT
Start: 2020-01-01 | End: 2020-01-01

## 2020-01-01 RX ORDER — ACETAMINOPHEN 325 MG/1
650 TABLET ORAL EVERY 4 HOURS PRN
Status: DISCONTINUED | OUTPATIENT
Start: 2020-01-01 | End: 2020-01-01 | Stop reason: HOSPADM

## 2020-01-01 RX ORDER — SODIUM CHLORIDE 0.9 % (FLUSH) 0.9 %
10 SYRINGE (ML) INJECTION EVERY 12 HOURS SCHEDULED
Status: DISCONTINUED | OUTPATIENT
Start: 2020-01-01 | End: 2020-01-01 | Stop reason: HOSPADM

## 2020-01-01 RX ORDER — IPRATROPIUM BROMIDE AND ALBUTEROL SULFATE 2.5; .5 MG/3ML; MG/3ML
3 SOLUTION RESPIRATORY (INHALATION)
Qty: 360 ML
Start: 2020-01-01 | End: 2020-01-01 | Stop reason: SDUPTHER

## 2020-01-01 RX ORDER — FUROSEMIDE 20 MG/1
20 TABLET ORAL DAILY
Qty: 30 TABLET | Refills: 5 | Status: SHIPPED | OUTPATIENT
Start: 2020-01-01

## 2020-01-01 RX ORDER — FUROSEMIDE 20 MG/1
20 TABLET ORAL DAILY
Start: 2020-01-01 | End: 2020-01-01 | Stop reason: SDUPTHER

## 2020-01-01 RX ORDER — COLCHICINE 0.6 MG/1
TABLET ORAL
Qty: 30 TABLET | Refills: 0 | Status: SHIPPED | OUTPATIENT
Start: 2020-01-01

## 2020-01-01 RX ORDER — IPRATROPIUM BROMIDE AND ALBUTEROL SULFATE 2.5; .5 MG/3ML; MG/3ML
3 SOLUTION RESPIRATORY (INHALATION)
Status: DISCONTINUED | OUTPATIENT
Start: 2020-01-01 | End: 2020-01-01 | Stop reason: HOSPADM

## 2020-01-01 RX ORDER — IPRATROPIUM BROMIDE AND ALBUTEROL SULFATE 2.5; .5 MG/3ML; MG/3ML
3 SOLUTION RESPIRATORY (INHALATION) ONCE
Status: COMPLETED | OUTPATIENT
Start: 2020-01-01 | End: 2020-01-01

## 2020-01-01 RX ORDER — HYDROCODONE BITARTRATE AND ACETAMINOPHEN 5; 325 MG/1; MG/1
1 TABLET ORAL EVERY 6 HOURS PRN
Qty: 40 TABLET | Refills: 0 | Status: SHIPPED | OUTPATIENT
Start: 2020-01-01 | End: 2020-01-01

## 2020-01-01 RX ORDER — ACETAMINOPHEN 160 MG/5ML
650 SOLUTION ORAL EVERY 4 HOURS PRN
Status: DISCONTINUED | OUTPATIENT
Start: 2020-01-01 | End: 2020-01-01 | Stop reason: HOSPADM

## 2020-01-01 RX ORDER — BISACODYL 5 MG/1
5 TABLET, DELAYED RELEASE ORAL DAILY PRN
Status: DISCONTINUED | OUTPATIENT
Start: 2020-01-01 | End: 2020-01-01 | Stop reason: HOSPADM

## 2020-01-01 RX ORDER — ONDANSETRON 4 MG/1
4 TABLET, FILM COATED ORAL EVERY 6 HOURS PRN
Status: DISCONTINUED | OUTPATIENT
Start: 2020-01-01 | End: 2020-01-01 | Stop reason: HOSPADM

## 2020-01-01 RX ORDER — TRAMADOL HYDROCHLORIDE 50 MG/1
50 TABLET ORAL EVERY 6 HOURS PRN
Qty: 40 TABLET | Refills: 0 | Status: SHIPPED | OUTPATIENT
Start: 2020-01-01

## 2020-01-01 RX ORDER — COLCHICINE 0.6 MG/1
0.6 TABLET ORAL DAILY
Qty: 30 TABLET | Refills: 0 | Status: SHIPPED | OUTPATIENT
Start: 2020-01-01 | End: 2020-01-01

## 2020-01-01 RX ORDER — COLCHICINE 0.6 MG/1
0.6 TABLET ORAL TAKE AS DIRECTED
Qty: 3 TABLET | Refills: 0 | Status: SHIPPED | OUTPATIENT
Start: 2020-01-01 | End: 2020-01-01 | Stop reason: SDUPTHER

## 2020-01-01 RX ORDER — IPRATROPIUM BROMIDE AND ALBUTEROL SULFATE 2.5; .5 MG/3ML; MG/3ML
3 SOLUTION RESPIRATORY (INHALATION) EVERY 4 HOURS PRN
Status: DISCONTINUED | OUTPATIENT
Start: 2020-01-01 | End: 2020-01-01 | Stop reason: HOSPADM

## 2020-01-01 RX ORDER — CEFTRIAXONE SODIUM 1 G/50ML
1 INJECTION, SOLUTION INTRAVENOUS ONCE
Status: COMPLETED | OUTPATIENT
Start: 2020-01-01 | End: 2020-01-01

## 2020-01-01 RX ORDER — POTASSIUM CHLORIDE 750 MG/1
CAPSULE, EXTENDED RELEASE ORAL
Qty: 30 CAPSULE | Refills: 0 | OUTPATIENT
Start: 2020-01-01

## 2020-01-01 RX ORDER — METHYLPREDNISOLONE SODIUM SUCCINATE 125 MG/2ML
125 INJECTION, POWDER, LYOPHILIZED, FOR SOLUTION INTRAMUSCULAR; INTRAVENOUS ONCE
Status: COMPLETED | OUTPATIENT
Start: 2020-01-01 | End: 2020-01-01

## 2020-01-01 RX ORDER — IBUPROFEN 200 MG
200 TABLET ORAL EVERY 6 HOURS PRN
COMMUNITY

## 2020-01-01 RX ORDER — ACETAMINOPHEN 325 MG/1
650 TABLET ORAL EVERY 4 HOURS PRN
Start: 2020-01-01

## 2020-01-01 RX ORDER — CARVEDILOL 6.25 MG/1
6.25 TABLET ORAL 2 TIMES DAILY WITH MEALS
Status: DISCONTINUED | OUTPATIENT
Start: 2020-01-01 | End: 2020-01-01 | Stop reason: HOSPADM

## 2020-01-01 RX ORDER — ALBUTEROL SULFATE 90 UG/1
2 AEROSOL, METERED RESPIRATORY (INHALATION) EVERY 4 HOURS PRN
Qty: 18 G | Refills: 11 | Status: SHIPPED | OUTPATIENT
Start: 2020-01-01

## 2020-01-01 RX ORDER — NITROGLYCERIN 0.4 MG/1
0.4 TABLET SUBLINGUAL
Status: DISCONTINUED | OUTPATIENT
Start: 2020-01-01 | End: 2020-01-01 | Stop reason: HOSPADM

## 2020-01-01 RX ORDER — IPRATROPIUM BROMIDE AND ALBUTEROL SULFATE 2.5; .5 MG/3ML; MG/3ML
3 SOLUTION RESPIRATORY (INHALATION)
Qty: 360 ML | Refills: 11 | Status: SHIPPED | OUTPATIENT
Start: 2020-01-01

## 2020-01-01 RX ORDER — LISINOPRIL 5 MG/1
5 TABLET ORAL DAILY
Status: DISCONTINUED | OUTPATIENT
Start: 2020-01-01 | End: 2020-01-01 | Stop reason: HOSPADM

## 2020-01-01 RX ORDER — CEPHALEXIN 500 MG/1
500 CAPSULE ORAL 3 TIMES DAILY
Qty: 21 CAPSULE | Refills: 0 | Status: SHIPPED | OUTPATIENT
Start: 2020-01-01

## 2020-01-01 RX ORDER — BISACODYL 5 MG/1
5 TABLET, DELAYED RELEASE ORAL DAILY PRN
Start: 2020-01-01

## 2020-01-01 RX ADMIN — LISINOPRIL 5 MG: 5 TABLET ORAL at 09:51

## 2020-01-01 RX ADMIN — ACETAMINOPHEN 1000 MG: 500 TABLET, FILM COATED ORAL at 17:49

## 2020-01-01 RX ADMIN — LISINOPRIL 5 MG: 5 TABLET ORAL at 08:16

## 2020-01-01 RX ADMIN — SODIUM CHLORIDE, PRESERVATIVE FREE 10 ML: 5 INJECTION INTRAVENOUS at 08:33

## 2020-01-01 RX ADMIN — CARVEDILOL 6.25 MG: 6.25 TABLET, FILM COATED ORAL at 17:42

## 2020-01-01 RX ADMIN — SODIUM CHLORIDE, PRESERVATIVE FREE 10 ML: 5 INJECTION INTRAVENOUS at 20:28

## 2020-01-01 RX ADMIN — CARVEDILOL 6.25 MG: 6.25 TABLET, FILM COATED ORAL at 17:22

## 2020-01-01 RX ADMIN — SODIUM CHLORIDE, PRESERVATIVE FREE 10 ML: 5 INJECTION INTRAVENOUS at 20:47

## 2020-01-01 RX ADMIN — LEVOTHYROXINE SODIUM 75 MCG: 75 TABLET ORAL at 06:05

## 2020-01-01 RX ADMIN — IPRATROPIUM BROMIDE AND ALBUTEROL SULFATE 3 ML: 2.5; .5 SOLUTION RESPIRATORY (INHALATION) at 19:59

## 2020-01-01 RX ADMIN — IPRATROPIUM BROMIDE AND ALBUTEROL SULFATE 3 ML: 2.5; .5 SOLUTION RESPIRATORY (INHALATION) at 15:04

## 2020-01-01 RX ADMIN — CARVEDILOL 6.25 MG: 6.25 TABLET, FILM COATED ORAL at 08:16

## 2020-01-01 RX ADMIN — METHYLPREDNISOLONE SODIUM SUCCINATE 125 MG: 125 INJECTION, POWDER, FOR SOLUTION INTRAMUSCULAR; INTRAVENOUS at 22:48

## 2020-01-01 RX ADMIN — CARVEDILOL 6.25 MG: 6.25 TABLET, FILM COATED ORAL at 20:28

## 2020-01-01 RX ADMIN — PAROXETINE 20 MG: 20 TABLET, FILM COATED ORAL at 09:51

## 2020-01-01 RX ADMIN — Medication 3 MG: at 21:52

## 2020-01-01 RX ADMIN — SODIUM CHLORIDE, PRESERVATIVE FREE 10 ML: 5 INJECTION INTRAVENOUS at 08:42

## 2020-01-01 RX ADMIN — LISINOPRIL 5 MG: 5 TABLET ORAL at 08:42

## 2020-01-01 RX ADMIN — CARVEDILOL 6.25 MG: 6.25 TABLET, FILM COATED ORAL at 09:51

## 2020-01-01 RX ADMIN — ACETAMINOPHEN 650 MG: 325 TABLET, FILM COATED ORAL at 19:16

## 2020-01-01 RX ADMIN — SODIUM CHLORIDE, PRESERVATIVE FREE 10 ML: 5 INJECTION INTRAVENOUS at 08:17

## 2020-01-01 RX ADMIN — IPRATROPIUM BROMIDE AND ALBUTEROL SULFATE 3 ML: 2.5; .5 SOLUTION RESPIRATORY (INHALATION) at 07:09

## 2020-01-01 RX ADMIN — IPRATROPIUM BROMIDE AND ALBUTEROL SULFATE 3 ML: 2.5; .5 SOLUTION RESPIRATORY (INHALATION) at 09:28

## 2020-01-01 RX ADMIN — PAROXETINE 20 MG: 20 TABLET, FILM COATED ORAL at 08:16

## 2020-01-01 RX ADMIN — SODIUM CHLORIDE, PRESERVATIVE FREE 10 ML: 5 INJECTION INTRAVENOUS at 00:42

## 2020-01-01 RX ADMIN — IPRATROPIUM BROMIDE AND ALBUTEROL SULFATE 3 ML: 2.5; .5 SOLUTION RESPIRATORY (INHALATION) at 07:19

## 2020-01-01 RX ADMIN — IPRATROPIUM BROMIDE AND ALBUTEROL SULFATE 3 ML: 2.5; .5 SOLUTION RESPIRATORY (INHALATION) at 06:48

## 2020-01-01 RX ADMIN — PAROXETINE 20 MG: 20 TABLET, FILM COATED ORAL at 08:41

## 2020-01-01 RX ADMIN — IOPAMIDOL 95 ML: 612 INJECTION, SOLUTION INTRAVENOUS at 21:09

## 2020-01-01 RX ADMIN — FUROSEMIDE 20 MG: 20 TABLET ORAL at 11:26

## 2020-01-01 RX ADMIN — FUROSEMIDE 20 MG: 20 TABLET ORAL at 08:16

## 2020-01-01 RX ADMIN — ACETAMINOPHEN 650 MG: 325 TABLET, FILM COATED ORAL at 20:49

## 2020-01-01 RX ADMIN — PAROXETINE 20 MG: 20 TABLET, FILM COATED ORAL at 08:33

## 2020-01-01 RX ADMIN — METHYLPREDNISOLONE SODIUM SUCCINATE 60 MG: 125 INJECTION, POWDER, FOR SOLUTION INTRAMUSCULAR; INTRAVENOUS at 00:42

## 2020-01-01 RX ADMIN — SODIUM CHLORIDE, PRESERVATIVE FREE 10 ML: 5 INJECTION INTRAVENOUS at 20:50

## 2020-01-01 RX ADMIN — ACETAMINOPHEN 650 MG: 325 TABLET, FILM COATED ORAL at 11:36

## 2020-01-01 RX ADMIN — FUROSEMIDE 20 MG: 20 INJECTION, SOLUTION INTRAMUSCULAR; INTRAVENOUS at 16:41

## 2020-01-01 RX ADMIN — FUROSEMIDE 20 MG: 20 TABLET ORAL at 08:41

## 2020-01-01 RX ADMIN — IPRATROPIUM BROMIDE AND ALBUTEROL SULFATE 3 ML: 2.5; .5 SOLUTION RESPIRATORY (INHALATION) at 21:05

## 2020-01-01 RX ADMIN — IPRATROPIUM BROMIDE AND ALBUTEROL SULFATE 3 ML: 2.5; .5 SOLUTION RESPIRATORY (INHALATION) at 15:15

## 2020-01-01 RX ADMIN — LEVOTHYROXINE SODIUM 75 MCG: 75 TABLET ORAL at 06:26

## 2020-01-01 RX ADMIN — LISINOPRIL 5 MG: 5 TABLET ORAL at 08:33

## 2020-01-01 RX ADMIN — IPRATROPIUM BROMIDE AND ALBUTEROL SULFATE 3 ML: 2.5; .5 SOLUTION RESPIRATORY (INHALATION) at 11:51

## 2020-01-01 RX ADMIN — SODIUM CHLORIDE, PRESERVATIVE FREE 10 ML: 5 INJECTION INTRAVENOUS at 09:51

## 2020-01-01 RX ADMIN — CARVEDILOL 6.25 MG: 6.25 TABLET, FILM COATED ORAL at 08:42

## 2020-01-01 RX ADMIN — CEFTRIAXONE SODIUM 1 G: 1 INJECTION, SOLUTION INTRAVENOUS at 18:36

## 2020-01-01 RX ADMIN — Medication 3 MG: at 01:46

## 2020-01-01 RX ADMIN — LEVOTHYROXINE SODIUM 75 MCG: 75 TABLET ORAL at 06:11

## 2020-01-01 RX ADMIN — IPRATROPIUM BROMIDE AND ALBUTEROL SULFATE 3 ML: 2.5; .5 SOLUTION RESPIRATORY (INHALATION) at 22:54

## 2020-01-01 RX ADMIN — LEVOTHYROXINE SODIUM 75 MCG: 75 TABLET ORAL at 06:50

## 2020-01-01 RX ADMIN — IPRATROPIUM BROMIDE AND ALBUTEROL SULFATE 3 ML: 2.5; .5 SOLUTION RESPIRATORY (INHALATION) at 11:09

## 2020-01-01 RX ADMIN — IPRATROPIUM BROMIDE AND ALBUTEROL SULFATE 3 ML: 2.5; .5 SOLUTION RESPIRATORY (INHALATION) at 07:02

## 2020-01-01 RX ADMIN — IPRATROPIUM BROMIDE AND ALBUTEROL SULFATE 3 ML: 2.5; .5 SOLUTION RESPIRATORY (INHALATION) at 16:58

## 2020-01-01 RX ADMIN — IPRATROPIUM BROMIDE AND ALBUTEROL SULFATE 3 ML: 2.5; .5 SOLUTION RESPIRATORY (INHALATION) at 15:17

## 2020-01-01 RX ADMIN — IPRATROPIUM BROMIDE AND ALBUTEROL SULFATE 3 ML: 2.5; .5 SOLUTION RESPIRATORY (INHALATION) at 20:38

## 2020-01-01 RX ADMIN — CARVEDILOL 6.25 MG: 6.25 TABLET, FILM COATED ORAL at 08:33

## 2020-01-27 PROBLEM — J44.1 ACUTE EXACERBATION OF CHRONIC OBSTRUCTIVE PULMONARY DISEASE (COPD) (HCC): Status: ACTIVE | Noted: 2020-01-01

## 2020-01-27 PROBLEM — R06.00 DYSPNEA: Status: ACTIVE | Noted: 2020-01-01

## 2020-01-30 PROBLEM — J96.21 ACUTE AND CHRONIC RESPIRATORY FAILURE WITH HYPOXIA (HCC): Status: ACTIVE | Noted: 2020-01-01

## 2020-02-26 NOTE — TELEPHONE ENCOUNTER
Adding to note from leora Cabral     Pt also would like to see if the doctor can call in a nose piece for a nebulizer

## 2020-02-27 NOTE — TELEPHONE ENCOUNTER
Pt needs an order for goulds for a bedside commode.      [phone]         Adding to note from leora Cabral      Pt also would like to see if the doctor can call in a nose piece for a nebulizer     Pt conatct info is primary number [phone]    This is a duplicate msg pt son has called again this morning as well     Pt gets discharged  today

## 2020-03-03 NOTE — DISCHARGE INSTRUCTIONS
Wash your feet twice daily with soap and water then apply Neosporin and a dry gauze dressing.  Take colchicine for gout as prescribed.  Follow-up with your family doctor as needed or return to the emergency department if your symptoms worsen or do not improve.

## 2020-03-03 NOTE — ED PROVIDER NOTES
EMERGENCY DEPARTMENT ENCOUNTER    Room Number:  40/40  Date of encounter:  3/3/2020  PCP: Gerard Jama MD  Historian: Patient and son.      HPI:  Chief Complaint: Right foot pain  A complete HPI/ROS/PMH/PSH/SH/FH are unobtainable due to: N/A    Context: Jazmine Brown is a 89 y.o. female who presents to the ED c/o right foot pain which started gradually while she was at rehab.  She was discharged from this facility recently for acute issues and was sent to rehab for strengthening.  Her son reports that she has been home for 3 to 4 days and is reporting increased right foot pain.  She has no known injury, no recent falls.  She denies fevers or chills.  She has never had this before.  The pain is in the right foot and is described as aching.  It is constant.  It is exacerbated by walking or movement, somewhat relieved by rest.      PAST MEDICAL HISTORY  Active Ambulatory Problems     Diagnosis Date Noted   • Fatigue 04/01/2016   • Hypothyroidism 04/01/2016   • Atrial fibrillation (CMS/[facility]) 04/01/2016   • Shortness of breath 07/26/2017   • Acute on chronic combined systolic and diastolic heart failure (CMS/HCC) 10/18/2017   • Acute contact otitis externa of left ear 01/17/2018   • Essential hypertension 08/15/2018   • Chronic fatigue 08/15/2018   • Cardiac pacemaker in situ 04/29/2014   • AV block 03/17/2016   • Moderate to severe pulmonary hypertension (CMS/[facility]) 09/14/2017   • Hematoma 03/06/2019   • Major depressive disorder 04/18/2019   • Fall at home 08/13/2019   • Anemia 08/21/2019   • Chronic respiratory failure with hypoxia (CMS/[facility]) 09/11/2019   • Chronic cor pulmonale (CMS/[facility]) 09/14/2017   • DNR (do not resuscitate) 10/27/2017   • Dyspnea 01/27/2020   • Acute exacerbation of chronic obstructive pulmonary disease (COPD) (CMS/[facility]) 01/27/2020   • Acute and chronic respiratory failure with hypoxia (CMS/[facility]) 01/30/2020     Resolved Ambulatory Problems     Diagnosis Date Noted   • Acute cystitis without  hematuria 08/24/2016   • Acute respiratory failure with hypoxia (CMS/HCC) 09/14/2017   • Long term current use of anticoagulant 11/28/2018     Past Medical History:   Diagnosis Date   • A-fib (CMS/HCC)    • Cardiomyopathy (CMS/HCC)    • CHF (congestive heart failure) (CMS/HCC)    • Depression    • Hypertension    • Polyp, sigmoid colon    • Pulmonary hypertension (CMS/HCC)    • Thyroid cancer (CMS/HCC)          PAST SURGICAL HISTORY  Past Surgical History:   Procedure Laterality Date   • CARDIAC PACEMAKER PLACEMENT     • HYSTERECTOMY     • PACEMAKER IMPLANTATION     • TONSILLECTOMY AND ADENOIDECTOMY     • TOTAL THYROIDECTOMY           FAMILY HISTORY  Family History   Problem Relation Age of Onset   • Heart disease Mother    • Hypertension Mother    • Hypertension Father         benign essential hypertension         SOCIAL HISTORY  Social History     Socioeconomic History   • Marital status:      Spouse name: Not on file   • Number of children: 2   • Years of education: Not on file   • Highest education level: Not on file   Occupational History   • Occupation: Lunch Room Lady at [employer]     Employer: RETIRED   Tobacco Use   • Smoking status: Never Smoker   • Smokeless tobacco: Never Used   Substance and Sexual Activity   • Alcohol use: No   • Drug use: No   • Sexual activity: Defer         ALLERGIES  Codeine; Smz-tmp ds [sulfamethoxazole-trimethoprim]; and Latex        REVIEW OF SYSTEMS  Review of Systems   Constitutional: Negative for fever.   HENT: Negative for sore throat.    Eyes: Negative.    Respiratory: Negative for cough and shortness of breath.    Cardiovascular: Negative for chest pain.   Gastrointestinal: Negative for abdominal pain, diarrhea and vomiting.   Genitourinary: Negative for dysuria.   Musculoskeletal: Positive for arthralgias. Negative for neck pain.   Skin: Positive for color change. Negative for rash.   Allergic/Immunologic: Negative.    Neurological: Negative for weakness,  numbness and headaches.   Hematological: Negative.    Psychiatric/Behavioral: Negative.    All other systems reviewed and are negative.       All systems reviewed and negative except for those discussed in HPI.       PHYSICAL EXAM    I have reviewed the triage vital signs and nursing notes.    ED Triage Vitals [03/03/20 1412]   Temp Heart Rate Resp BP SpO2   98.5 °F (36.9 °C) 82 18 -- (!) 86 %      Temp src Heart Rate Source Patient Position BP Location FiO2 (%)   Tympanic Monitor -- -- --       Physical Exam   Constitutional: Pt. is oriented to person, place, and time and well-developed, well-nourished, and in no distress. No distress.   HENT: Normocephalic and atraumatic,  EOM are normal. Pupils are equal, round, and reactive to light. Oropharynx moist/nonerythematous.  Neck: Normal range of motion. Neck supple. No JVD present. No tracheal deviation present. No thyromegaly present.   Cardiovascular: Normal rate, regular rhythm and normal heart sounds. Exam reveals no gallop and no friction rub.   No murmur heard.  Pulmonary/Chest: Effort normal and breath sounds normal. No stridor. No respiratory distress. No wheezes, no rales.   Abdominal: Soft. Bowel sounds are normal. No distension. There is no tenderness. There is no rebound and no guarding.   Musculoskeletal: Right foot: There has been a prior second toe amputation.  There is prominent hallux valgus with A small superficial blister over the metatarsal phalangeal joint.  There is minimal surrounding erythema that extends to the midfoot and over the sole.  She has some superficial excoriated areas that look like where she has been scratching, although she denies this.  Neurological: Pt. is alert and oriented to person, place, and time.No cranial nerve deficit. GCS score is 15.   Skin: Skin is warm and dry. No rash noted. Pt. is not diaphoretic. No erythema. Cellulitic changes described above.  Psychiatric: Mood, affect and judgment normal.   Nursing note and  vitals reviewed.        LAB RESULTS  Recent Results (from the past 24 hour(s))   Uric Acid    Collection Time: 03/03/20  5:42 PM   Result Value Ref Range    Uric Acid 5.3 2.4 - 5.7 mg/dL   CBC Auto Differential    Collection Time: 03/03/20  5:42 PM   Result Value Ref Range    WBC 9.77 3.40 - 10.80 10*3/mm3    RBC 2.75 (L) 3.77 - 5.28 10*6/mm3    Hemoglobin 9.4 (L) 12.0 - 15.9 g/dL    Hematocrit 29.5 (L) 34.0 - 46.6 %    .3 (H) 79.0 - 97.0 fL    MCH 34.2 (H) 26.6 - 33.0 pg    MCHC 31.9 31.5 - 35.7 g/dL    RDW 15.0 12.3 - 15.4 %    RDW-SD 57.5 (H) 37.0 - 54.0 fl    MPV 11.8 6.0 - 12.0 fL    Platelets 360 140 - 450 10*3/mm3    Neutrophil % 83.9 (H) 42.7 - 76.0 %    Lymphocyte % 9.3 (L) 19.6 - 45.3 %    Monocyte % 5.8 5.0 - 12.0 %    Eosinophil % 0.1 (L) 0.3 - 6.2 %    Basophil % 0.3 0.0 - 1.5 %    Immature Grans % 0.6 (H) 0.0 - 0.5 %    Neutrophils, Absolute 8.19 (H) 1.70 - 7.00 10*3/mm3    Lymphocytes, Absolute 0.91 0.70 - 3.10 10*3/mm3    Monocytes, Absolute 0.57 0.10 - 0.90 10*3/mm3    Eosinophils, Absolute 0.01 0.00 - 0.40 10*3/mm3    Basophils, Absolute 0.03 0.00 - 0.20 10*3/mm3    Immature Grans, Absolute 0.06 (H) 0.00 - 0.05 10*3/mm3    nRBC 0.1 0.0 - 0.2 /100 WBC       Ordered the above labs and independently reviewed the results.        RADIOLOGY  Xr Foot 3+ View Right    Result Date: 3/3/2020  THREE-VIEW RIGHT FOOT  HISTORY: Foot pain after walking.  FINDINGS: The patient has had previous amputation of the second toe at the level of the base of the second proximal phalanx. There are prominent degenerative changes at the tarsometatarsal joints and to a lesser extent at the first MTP joint. There is also prominent soft tissue calcification at the medial margin of the first MTP joint. There are some adjacent periarticular erosions and the findings could relate to gouty arthritis and clinical correlation is recommended.  This report was finalized on 3/3/2020 5:28 PM by Dr. Ja Soto M.D.        I  ordered the above noted radiological studies. Reviewed by me and discussed with radiologist.  See dictation for official radiology interpretation.      PROCEDURES    Procedures      MEDICATIONS GIVEN IN ER    Medications   acetaminophen (TYLENOL) tablet 1,000 mg (1,000 mg Oral Given 3/3/20 1749)   cefTRIAXone (ROCEPHIN) IVPB 1 g (0 g Intravenous Stopped 3/3/20 1904)         PROGRESS, DATA ANALYSIS, CONSULTS, AND MEDICAL DECISION MAKING    Any/all labs have been independently reviewed by me.  Any/all radiology studies have been reviewed by me and discussed with radiologist dictating the report.   EKG's independently viewed and interpreted by me.  Discussion below represents my analysis of pertinent findings related to patient's condition, differential diagnosis, treatment plan and final disposition.      ED Course as of Mar 03 2319   Tue Mar 03, 2020   1846 X-rays and symptoms are certainly suspicious for gout although her uric acid is normal.  I will prescribe colchicine, 2 now and 1 in 12 hours.  We will also do Keflex for the next week given that there is some mild erythema over the dorsum of the foot.  Counseled patient and son on proper wound care.    [WC]      ED Course User Index  [WC] Loc Cardenas MD       AS OF 11:19 PM VITALS:    BP - 151/69  HR - 79  TEMP - 98.7 °F (37.1 °C) (Oral)  02 SATS - 97%        DIAGNOSIS  Final diagnoses:   Acute gout due to other secondary cause involving toe of right foot   Blister of right foot, initial encounter   Cellulitis of right foot         DISPOSITION  Discharged           Loc Cardenas MD  03/03/20 9908

## 2020-03-04 NOTE — OUTREACH NOTE
Care Coordination Note  Talked with Staff/ [facility] [phone]. Confirmed patient is receiving SN, PT, OT  home health services with start of care 3/4/20.     Merry Romero RN  Ambulatory     3/4/2020, 3:20 PM

## 2020-03-04 NOTE — OUTREACH NOTE
Patient Outreach Note  Talked with patient and patient's daughter in [facility], (per patient permission). Discussed 3/3/20 ED visit regarding gout/cellulitis to right foot. Patient states to be compliant with ED recommendation; is taking Keflex and Colchicine as directed  and states symptoms have improved. Compliant with dressing changes as ED directed and confirms PCP appointment for 3/5/20. Marissa states home health services will begin today. Daughter in  states patient discharged home from SNF following short term rehab after hospitalization for SOB and exacerbation of COPD.  Patient lives alone; has 24/7 caregivers and receiving assistance as needed with activities. Patient ambulates with rollator; compliant with use of O2 ; medications and medical appointments. She reports patient has no difficulty with chest pain; SOB; appetite or sleeping. Reviewed with daughter in  24/7 Nurse Line Telephone number; and  ACM contact information and Case Management services. She verbalized understanding.  Home health arrived to see patient and ACM will continue outreach. No further questions or concerns voiced at this time.     Merry Romero RN  Ambulatory     3/4/2020, 3:24 PM

## 2020-03-05 PROBLEM — M10.9 ACUTE GOUT OF RIGHT FOOT: Status: ACTIVE | Noted: 2020-01-01

## 2020-03-05 NOTE — PROGRESS NOTES
Subjective   Jazmine Brown is a 89 y.o. female. Patient is here today for follow-up on right foot pain as well as chronic breathing problems.  Patient's had no trauma.  She was seen at the emergency room and was felt to have probable gout in the right foot was started on colchicine and is using Motrin.  The foot pain has decreased.  According to the family the patient also has some skin break and splits on the heel and also a bunion on the toe.  She is on some Keflex and wound care and has home health coming.  She was on colchicine for just 3 doses.  Chief Complaint   Patient presents with   • Foot Pain     rIGHT    • Follow-up     FROM [facility]          Vitals:    03/05/20 1433   BP: 147/67   Pulse: 75   Resp: 18   Temp: 96.6 °F (35.9 °C)   SpO2: 100%     Body mass index is 21.95 kg/m².  The following portions of the patient's history were reviewed and updated as appropriate: allergies, current medications, past family history, past medical history, past social history, past surgical history and problem list.    Past Medical History:   Diagnosis Date   • A-fib (CMS/HCC)    • Cardiomyopathy (CMS/HCC)    • CHF (congestive heart failure) (CMS/HCC)    • Chronic fatigue    • Depression    • Dyspnea    • Hypertension    • Polyp, sigmoid colon    • Pulmonary hypertension (CMS/HCC)    • Thyroid cancer (CMS/HCC)       Allergies   Allergen Reactions   • Codeine Hallucinations   • Smz-Tmp Ds [Sulfamethoxazole-Trimethoprim] Hallucinations   • Latex Rash      Social History     Socioeconomic History   • Marital status:      Spouse name: Not on file   • Number of children: 2   • Years of education: Not on file   • Highest education level: Not on file   Occupational History   • Occupation: Lunch Room Lady at [employer]     Employer: RETIRED   Tobacco Use   • Smoking status: Never Smoker   • Smokeless tobacco: Never Used   Substance and Sexual Activity   • Alcohol use: No   • Drug use: No   • Sexual activity:  Defer        Current Outpatient Medications:   •  carvedilol (COREG) 6.25 MG tablet, TAKE 1 TABLET BY MOUTH TWICE DAILY WITH MEALS, Disp: 180 tablet, Rfl: 1  •  cephalexin (KEFLEX) 500 MG capsule, Take 1 capsule by mouth 3 (Three) Times a Day., Disp: 21 capsule, Rfl: 0  •  Cyanocobalamin (VITAMIN B-12) 5000 MCG sublingual tablet, Place 1 tablet under the tongue Daily., Disp: , Rfl:   •  ibuprofen (ADVIL,MOTRIN) 200 MG tablet, Take 200 mg by mouth Every 6 (Six) Hours As Needed for Mild Pain ., Disp: , Rfl:   •  levothyroxine (SYNTHROID, LEVOTHROID) 75 MCG tablet, Take 1 tablet by mouth Daily., Disp: 90 tablet, Rfl: 0  •  lisinopril (PRINIVIL,ZESTRIL) 5 MG tablet, Take 1 tablet by mouth Daily., Disp: 90 tablet, Rfl: 1  •  PARoxetine (PAXIL) 20 MG tablet, TAKE 1 TABLET BY MOUTH ONCE DAILY, Disp: 90 tablet, Rfl: 1  •  umeclidinium-vilanterol (ANORO ELLIPTA) 62.5-25 MCG/INH aerosol powder  inhaler, Inhale 1 puff Daily., Disp: 1 each, Rfl: 10  •  vitamin D (ERGOCALCIFEROL) 1.25 MG (13817 UT) capsule capsule, TAKE 1 CAPSULE BY MOUTH ONCE EVERY 7 DAYS (Patient taking differently: Takes Wednesdays), Disp: 12 capsule, Rfl: 0  •  acetaminophen (TYLENOL) 325 MG tablet, Take 2 tablets by mouth Every 4 (Four) Hours As Needed for Mild Pain ., Disp: , Rfl:   •  albuterol sulfate  (90 Base) MCG/ACT inhaler, Inhale 2 puffs Every 4 (Four) Hours As Needed for Wheezing., Disp: 18 g, Rfl: 11  •  bisacodyl (DULCOLAX) 5 MG EC tablet, Take 1 tablet by mouth Daily As Needed for Constipation., Disp: , Rfl:   •  calcium carbonate (TUMS) 500 MG chewable tablet, Chew 1,000 mg 2 (Two) Times a Day As Needed for Heartburn., Disp: , Rfl:   •  colchicine 0.6 MG tablet, Take 1 tablet by mouth Daily. Take 2 tablets now and 1 tablet in 12 hours., Disp: 30 tablet, Rfl: 0  •  furosemide (LASIX) 20 MG tablet, Take 1 tablet by mouth Daily., Disp: 30 tablet, Rfl: 5  •  ipratropium-albuterol (DUO-NEB) 0.5-2.5 mg/3 ml nebulizer, Take 3 mL by  nebulization 3 (Three) Times a Day., Disp: 360 mL, Rfl: 11  •  melatonin 3 MG tablet, Take 1 tablet by mouth At Night As Needed for Sleep., Disp: 30 tablet, Rfl:      Objective     History of Present Illness     Review of Systems   Constitutional: Negative.    HENT: Negative.    Eyes: Negative.    Respiratory: Negative.    Cardiovascular: Negative.    Gastrointestinal: Negative.    Musculoskeletal: Positive for arthralgias.        Right foot pain in the ankle joint as well as the ball of the foot   Skin: Negative.    Neurological: Negative.    Psychiatric/Behavioral: Negative.        Physical Exam   Constitutional: She appears well-developed and well-nourished.   Pleasant, cooperative in a wheelchair on continuous oxygen at 2 L   HENT:   Head: Normocephalic and atraumatic.   Eyes: Conjunctivae are normal. No scleral icterus.   Neck: Normal range of motion. Neck supple.   Cardiovascular: Normal rate.   Pulmonary/Chest: Effort normal and breath sounds normal. No respiratory distress. She has no wheezes. She has no rales.   Musculoskeletal: She exhibits tenderness. She exhibits no edema.   Right foot tenderness   Skin: Skin is warm and dry.   Psychiatric: She has a normal mood and affect. Her behavior is normal.   Nursing note and vitals reviewed.      ASSESSMENT CBC at the emergency room showed a normal white cell count but a chronically low RBC count hemoglobin and hematocrit.  Uric acid level was 5.3.  X-rays of the foot showed no fractures, degenerative changes at the tarsometatarsal joints and the first MTP joint and also some calcifications and periarticular erosions consistent with gouty arthritis  #1-right foot pain, presumptively gout and slightly improved  #2-split skin on the right foot, on antibiotics and wound care  #3-chronic respiratory failure on continuous oxygen  #4-chronic congestive heart failure     Problem List Items Addressed This Visit        Cardiovascular and Mediastinum    Acute on chronic  combined systolic and diastolic heart failure (CMS/HCC)    Essential hypertension    Relevant Medications    furosemide (LASIX) 20 MG tablet    Moderate to severe pulmonary hypertension (CMS/HCC) - Primary    Chronic cor pulmonale (CMS/HCC)       Musculoskeletal and Integument    Acute gout of right foot          PLAN I am going to have the patient continue colchicine 0.6 mg daily and she can use ibuprofen as needed for the pain.  Home health will continue to help with dressing changes and clean the skin splits.  She will continue on 2 L of oxygen and Lasix 20 mg and I would like to recheck her in about a week to examine the foot and see how she is doing in general.    There are no Patient Instructions on file for this visit.  Return in about 1 week (around 3/12/2020).

## 2020-03-10 NOTE — TELEPHONE ENCOUNTER
Pts son states that her toe has gotten more painful since she's seen Dr. Jama. He would like to know if theres anything that she can take for the pain. He can be reached at [phone].

## 2020-03-10 NOTE — TELEPHONE ENCOUNTER
Monisha caretenders, need to verify orders on pt. Once a week  for 4 weeks. To work on strength and balance.    Contact [phone]    jt

## 2020-03-11 NOTE — TELEPHONE ENCOUNTER
CALLED AND S/W OSBALDO AT [facility] AND GAVE VERBAL OKAY FOR ORDERS. DR. LAKE SENT A PAIN MEDICINE TO THE PHARMACY FOR PATIENT YESTERDAY. HER SON IS AWARE.

## 2020-03-12 NOTE — OUTREACH NOTE
Care Coordination Note  Talked with Dawna / [facility] [phone]. She confirms patient will be seen today and nurse will contact patient regarding visit.     Merry Romero RN  Ambulatory     3/12/2020, 14:30

## 2020-03-12 NOTE — OUTREACH NOTE
Patient Outreach Note  Talked with Marissa/ patient's caregiver. She states patient is scheduled for PCP appointment and visit from home health nurse today. Caregiver states patient has completed Keflex as ED directed; continues with pain to right foot; had fever this morning and is receiving home health services. Caregiver states patient has difficulty sleeping due to pain; has episodes of SOB and no difficulty with chest pain or appetite.  Reviewed with caregiver health services and contact information; and questions for PCP. Caregiver verbalized understanding. She states to appreciate phone call and no further questions or concerns voiced at this time.     Merry Romero RN  Ambulatory     3/12/2020, 13:40

## 2020-03-12 NOTE — PROGRESS NOTES
Subjective   Jazmine Brown is a 89 y.o. female. Patient is here today for follow-up on her right foot pain.  She has been using some colchicine and hydrocodone but continues with nightly right foot pain that is extremely severe according to the patient.  Its not as much of a problem during the day.  She had a uric acid level drawn at her last visit.  Chief Complaint   Patient presents with   • Gout     RIGHT FOOT   • Cellulitis     RIGHT FOOT          Vitals:    03/12/20 1422   BP: 123/64   Pulse: 77   Resp: 18   Temp: 97.4 °F (36.3 °C)   SpO2: 92%     Body mass index is 21.95 kg/m².  The following portions of the patient's history were reviewed and updated as appropriate: allergies, current medications, past family history, past medical history, past social history, past surgical history and problem list.    Past Medical History:   Diagnosis Date   • A-fib (CMS/HCC)    • Cardiomyopathy (CMS/HCC)    • CHF (congestive heart failure) (CMS/HCC)    • Chronic fatigue    • Depression    • Dyspnea    • Hypertension    • Polyp, sigmoid colon    • Pulmonary hypertension (CMS/HCC)    • Thyroid cancer (CMS/HCC)       Allergies   Allergen Reactions   • Codeine Hallucinations   • Smz-Tmp Ds [Sulfamethoxazole-Trimethoprim] Hallucinations   • Latex Rash      Social History     Socioeconomic History   • Marital status:      Spouse name: Not on file   • Number of children: 2   • Years of education: Not on file   • Highest education level: Not on file   Occupational History   • Occupation: Lunch Room Lady at [employer]     Employer: RETIRED   Tobacco Use   • Smoking status: Never Smoker   • Smokeless tobacco: Never Used   Substance and Sexual Activity   • Alcohol use: No   • Drug use: No   • Sexual activity: Defer        Current Outpatient Medications:   •  acetaminophen (TYLENOL) 325 MG tablet, Take 2 tablets by mouth Every 4 (Four) Hours As Needed for Mild Pain ., Disp: , Rfl:   •  albuterol sulfate  (90  Base) MCG/ACT inhaler, Inhale 2 puffs Every 4 (Four) Hours As Needed for Wheezing., Disp: 18 g, Rfl: 11  •  bisacodyl (DULCOLAX) 5 MG EC tablet, Take 1 tablet by mouth Daily As Needed for Constipation., Disp: , Rfl:   •  calcium carbonate (TUMS) 500 MG chewable tablet, Chew 1,000 mg 2 (Two) Times a Day As Needed for Heartburn., Disp: , Rfl:   •  carvedilol (COREG) 6.25 MG tablet, TAKE 1 TABLET BY MOUTH TWICE DAILY WITH MEALS, Disp: 180 tablet, Rfl: 1  •  cephalexin (KEFLEX) 500 MG capsule, Take 1 capsule by mouth 3 (Three) Times a Day., Disp: 21 capsule, Rfl: 0  •  colchicine 0.6 MG tablet, Take 2 tablets now and 1 tablet in 12 hours for an attack.  Take 1 tablet every day routinely., Disp: 30 tablet, Rfl: 0  •  Cyanocobalamin (VITAMIN B-12) 5000 MCG sublingual tablet, Place 1 tablet under the tongue Daily., Disp: , Rfl:   •  furosemide (LASIX) 20 MG tablet, Take 1 tablet by mouth Daily., Disp: 30 tablet, Rfl: 5  •  ibuprofen (ADVIL,MOTRIN) 200 MG tablet, Take 200 mg by mouth Every 6 (Six) Hours As Needed for Mild Pain ., Disp: , Rfl:   •  ipratropium-albuterol (DUO-NEB) 0.5-2.5 mg/3 ml nebulizer, Take 3 mL by nebulization 3 (Three) Times a Day., Disp: 360 mL, Rfl: 11  •  levothyroxine (SYNTHROID, LEVOTHROID) 75 MCG tablet, Take 1 tablet by mouth Daily., Disp: 90 tablet, Rfl: 0  •  lisinopril (PRINIVIL,ZESTRIL) 5 MG tablet, Take 1 tablet by mouth Daily., Disp: 90 tablet, Rfl: 1  •  melatonin 3 MG tablet, Take 1 tablet by mouth At Night As Needed for Sleep., Disp: 30 tablet, Rfl:   •  PARoxetine (PAXIL) 20 MG tablet, TAKE 1 TABLET BY MOUTH ONCE DAILY, Disp: 90 tablet, Rfl: 1  •  traMADol (ULTRAM) 50 MG tablet, Take 1 tablet by mouth Every 6 (Six) Hours As Needed for Moderate Pain ., Disp: 40 tablet, Rfl: 0  •  umeclidinium-vilanterol (ANORO ELLIPTA) 62.5-25 MCG/INH aerosol powder  inhaler, Inhale 1 puff Daily., Disp: 1 each, Rfl: 10  •  vitamin D (ERGOCALCIFEROL) 1.25 MG (95957 UT) capsule capsule, TAKE 1 CAPSULE BY  MOUTH ONCE EVERY 7 DAYS (Patient taking differently: Takes Wednesdays), Disp: 12 capsule, Rfl: 0     Objective     History of Present Illness     Review of Systems   Constitutional: Negative.    HENT: Negative.    Respiratory: Negative.    Cardiovascular: Negative.    Gastrointestinal: Negative.    Genitourinary: Negative.    Musculoskeletal: Negative.         Nocturnal right foot pain   Skin: Negative.    Neurological: Negative.    Psychiatric/Behavioral: Negative.        Physical Exam   Constitutional:   Pleasant, cooperative no acute distress   Musculoskeletal:   There are no red swollen joints in the right foot.  There is a bunion on the right big toe with some darkened dried skin but no drainage and no surrounding cellulitis.  The toe.joints all seem to move without any significant pain.  I could appreciate no tenderness in palpating the metatarsal area and the ankle was moving without any significant pain.  There are 2 small scabbed skin splits less than half a centimeter each with no signs of erythema or infection in the heel area   Nursing note and vitals reviewed.      ASSESSMENT uric acid level from last week was pretty normal at 5.3  #1-nocturnal right foot pain of uncertain etiology  #2-bunion the right big toe with no signs of cellulitis     Problem List Items Addressed This Visit     None      Visit Diagnoses     Foot pain, right    -  Primary    Relevant Medications    traMADol (ULTRAM) 50 MG tablet    Other Relevant Orders    Ambulatory Referral to Orthopedic Surgery    C-reactive Protein    Uric Acid    Sedimentation Rate    Rheumatoid Factor    CBC & Differential    Bunion, right        Relevant Orders    Ambulatory Referral to Orthopedic Surgery    C-reactive Protein    Uric Acid    Sedimentation Rate    Rheumatoid Factor    CBC & Differential          PLAN I am going to try the patient on tramadol 50 mg for some pain control.  She can continue the colchicine once a day for now.  I am having drawn  a CBC, CRP, sed rate, repeat uric acid and rheumatoid arthritis factor and I would like to get the patient with 1 of the foot specialist, Dr. Hayward for his opinion as I am not sure exactly what is causing all her pain    There are no Patient Instructions on file for this visit.  No follow-ups on file.

## 2023-12-28 NOTE — PROGRESS NOTES
Subjective   Jazmine Brown is a 89 y.o. female presenting with   Chief Complaint   Patient presents with   • Fatigue     weakness   • Urinary Tract Infection     burning with urination    • Coagulation Disorder        HPI Comments: This is a 89-year-old white female nonsmoker who comes in today for follow-up for fatigue.  She says ever since she was released from the hospital for treatment for congestive heart failure she has felt tired and gets short of breath even if she just walks across the room.  She does not have any chest pain or palpitations.  She has not noticed any leg swelling.    She also now says she has some burning on urination and has a positive leukocyte on today's urinalysis.    Fatigue   Associated symptoms include fatigue.   Urinary Tract Infection      Coagulation Disorder   Associated symptoms include fatigue.        The following portions of the patient's history were reviewed and updated as appropriate: current medications, past family history, past medical history, past social history, past surgical history and problem list.    Review of Systems   Constitutional: Positive for fatigue.   Respiratory: Positive for shortness of breath.    Genitourinary: Positive for dysuria.   All other systems reviewed and are negative.      Objective   Physical Exam   Constitutional: She is oriented to person, place, and time. She appears well-developed and well-nourished. No distress.   HENT:   Head: Normocephalic and atraumatic.   Mouth/Throat: Oropharynx is clear and moist. No oropharyngeal exudate.   Eyes: EOM are normal. Pupils are equal, round, and reactive to light.   Neck: Normal range of motion. Neck supple. No JVD present. No thyromegaly present.   Cardiovascular: Normal rate.  Frequent extrasystoles are present.   Murmur heard.   Systolic murmur is present with a grade of 1/6   Pulmonary/Chest: No tachypnea. No respiratory distress. She has no wheezes. She has no rhonchi. She has rales in the  right lower field and the left lower field.   Abdominal: Soft. Bowel sounds are normal.   Musculoskeletal: Normal range of motion. She exhibits no edema or tenderness.   Neurological: She is alert and oriented to person, place, and time. No cranial nerve deficit. Coordination normal.   Skin: Skin is warm and dry. She is not diaphoretic.   Psychiatric: She has a normal mood and affect. Her behavior is normal.   Nursing note and vitals reviewed.      Assessment/Plan   Jazmine was seen today for fatigue, urinary tract infection and coagulation disorder.    Diagnoses and all orders for this visit:    Paroxysmal atrial fibrillation  -     POCT INR  -     Comprehensive Metabolic Panel    Chronic combined systolic and diastolic congestive heart failure  -     Comprehensive Metabolic Panel    Acquired hypothyroidism  -     Comprehensive Metabolic Panel  -     TSH    Chronic fatigue  -     Comprehensive Metabolic Panel    Acute cystitis without hematuria    Other orders  -     bumetanide (BUMEX) 0.5 MG tablet; Take 2 tablets by mouth Daily.  -     doxycycline (MONODOX) 50 MG capsule; Take 1 capsule by mouth 2 (Two) Times a Day.                   I would like him to return for another visit in 2 month(s)   2